# Patient Record
Sex: MALE | URBAN - METROPOLITAN AREA
[De-identification: names, ages, dates, MRNs, and addresses within clinical notes are randomized per-mention and may not be internally consistent; named-entity substitution may affect disease eponyms.]

---

## 2018-12-01 ENCOUNTER — HOSPITAL ENCOUNTER (EMERGENCY)
Facility: OTHER | Age: 73
Discharge: HOME | End: 2018-12-01
Attending: EMERGENCY MEDICINE
Payer: MEDICARE

## 2018-12-01 VITALS
SYSTOLIC BLOOD PRESSURE: 193 MMHG | HEIGHT: 64 IN | DIASTOLIC BLOOD PRESSURE: 85 MMHG | HEART RATE: 62 BPM | RESPIRATION RATE: 16 BRPM | WEIGHT: 150 LBS | OXYGEN SATURATION: 99 % | BODY MASS INDEX: 25.61 KG/M2 | TEMPERATURE: 98 F

## 2018-12-01 DIAGNOSIS — R10.9 ABDOMINAL PAIN, UNSPECIFIED ABDOMINAL LOCATION: Primary | ICD-10-CM

## 2018-12-01 DIAGNOSIS — N13.30 HYDRONEPHROSIS, UNSPECIFIED HYDRONEPHROSIS TYPE: ICD-10-CM

## 2018-12-01 LAB
ALBUMIN SERPL BCP-MCNC: 3.2 G/DL
ALP SERPL-CCNC: 75 U/L
ALT SERPL W/O P-5'-P-CCNC: 13 U/L
ANION GAP SERPL CALC-SCNC: 8 MMOL/L
AST SERPL-CCNC: 20 U/L
BACTERIA #/AREA URNS HPF: NORMAL /HPF
BASOPHILS # BLD AUTO: 0 K/UL
BASOPHILS NFR BLD: 0 %
BILIRUB SERPL-MCNC: 0.3 MG/DL
BILIRUB UR QL STRIP: NEGATIVE
BUN SERPL-MCNC: 13 MG/DL
CALCIUM SERPL-MCNC: 8.4 MG/DL
CHLORIDE SERPL-SCNC: 106 MMOL/L
CLARITY UR: CLEAR
CO2 SERPL-SCNC: 22 MMOL/L
COLOR UR: YELLOW
CREAT SERPL-MCNC: 1.5 MG/DL
DIFFERENTIAL METHOD: ABNORMAL
EOSINOPHIL # BLD AUTO: 0 K/UL
EOSINOPHIL NFR BLD: 0.2 %
ERYTHROCYTE [DISTWIDTH] IN BLOOD BY AUTOMATED COUNT: 12.7 %
EST. GFR  (AFRICAN AMERICAN): 53 ML/MIN/1.73 M^2
EST. GFR  (NON AFRICAN AMERICAN): 46 ML/MIN/1.73 M^2
GLUCOSE SERPL-MCNC: 140 MG/DL
GLUCOSE UR QL STRIP: NEGATIVE
HCT VFR BLD AUTO: 35.8 %
HGB BLD-MCNC: 11.8 G/DL
HGB UR QL STRIP: ABNORMAL
HYALINE CASTS #/AREA URNS LPF: 0 /LPF
KETONES UR QL STRIP: NEGATIVE
LEUKOCYTE ESTERASE UR QL STRIP: NEGATIVE
LIPASE SERPL-CCNC: 21 U/L
LYMPHOCYTES # BLD AUTO: 1.1 K/UL
LYMPHOCYTES NFR BLD: 23.2 %
MCH RBC QN AUTO: 31.2 PG
MCHC RBC AUTO-ENTMCNC: 33 G/DL
MCV RBC AUTO: 95 FL
MICROSCOPIC COMMENT: NORMAL
MONOCYTES # BLD AUTO: 0.3 K/UL
MONOCYTES NFR BLD: 6.4 %
NEUTROPHILS # BLD AUTO: 3.4 K/UL
NEUTROPHILS NFR BLD: 70 %
NITRITE UR QL STRIP: NEGATIVE
PH UR STRIP: 7 [PH] (ref 5–8)
PLATELET # BLD AUTO: 261 K/UL
PMV BLD AUTO: 9.3 FL
POTASSIUM SERPL-SCNC: 4.7 MMOL/L
PROT SERPL-MCNC: 6.8 G/DL
PROT UR QL STRIP: ABNORMAL
RBC # BLD AUTO: 3.78 M/UL
RBC #/AREA URNS HPF: 3 /HPF (ref 0–4)
SODIUM SERPL-SCNC: 136 MMOL/L
SP GR UR STRIP: 1.02 (ref 1–1.03)
URN SPEC COLLECT METH UR: ABNORMAL
UROBILINOGEN UR STRIP-ACNC: NEGATIVE EU/DL
WBC # BLD AUTO: 4.88 K/UL
WBC #/AREA URNS HPF: 4 /HPF (ref 0–5)

## 2018-12-01 PROCEDURE — 80053 COMPREHEN METABOLIC PANEL: CPT

## 2018-12-01 PROCEDURE — 81000 URINALYSIS NONAUTO W/SCOPE: CPT

## 2018-12-01 PROCEDURE — 85025 COMPLETE CBC W/AUTO DIFF WBC: CPT

## 2018-12-01 PROCEDURE — 99284 EMERGENCY DEPT VISIT MOD MDM: CPT | Mod: 25

## 2018-12-01 PROCEDURE — 96360 HYDRATION IV INFUSION INIT: CPT | Mod: 59

## 2018-12-01 PROCEDURE — 83690 ASSAY OF LIPASE: CPT

## 2018-12-01 PROCEDURE — 51702 INSERT TEMP BLADDER CATH: CPT

## 2018-12-01 PROCEDURE — 25000003 PHARM REV CODE 250: Performed by: EMERGENCY MEDICINE

## 2018-12-01 RX ADMIN — SODIUM CHLORIDE 1000 ML: 0.9 INJECTION, SOLUTION INTRAVENOUS at 03:12

## 2018-12-01 NOTE — ED NOTES
Pt resting in bed comfortably, bp elevated, MD aware. Pt states he has not taken BP meds x 3days. Bed in the lowest locked position, side rails up x 2, call light within reach, NAD noted. Will continue to monitor.

## 2018-12-01 NOTE — ED NOTES
Pt resting in bed comfortably. Bed in the lowest locked position, side rails up x 2, call light within reach, NAD noted. Will continue to monitor.

## 2018-12-01 NOTE — ED NOTES
Pt /85, asymptomatic at this time, denies NA, SOB, CP, numbness/tingling. MD, Yunior, aware, ok to D/C.

## 2018-12-01 NOTE — ED PROVIDER NOTES
Encounter Date: 12/1/2018    SCRIBE #1 NOTE: I, Danny Holliday, am scribing for, and in the presence of, Dr. Mejia .       History     Chief Complaint   Patient presents with    Abdominal Pain     Pt came to the ed tonight c.o. abdominal pain llq sharp. pt x 1 day. pt seen at [facility] for same abdominal pain and had no dz.      Time seen by provider: 2:55 AM    This is a 73 y.o. male, with history of DM and HTN, who presents via EMS with complaint of constant, sharp, left-sided, abdominal pain that has been present for over a week. Per medical record, he presented to [facility] one week ago with left flank pain and had labs and abdominal x-ray there, and was treated for constipation. Patient states he was given a muscle relaxer and Colace and was advised to take Tylenol for pain as needed. He states the medication has provided no relief. He denies history of similar abdominal pain. He reports his last bowel movement was one week ago. He has tried taking [place] oil with no relief of constipation. Patient reports drinking plenty of water, but states he has been experiencing some decreased urinary output. He denies fevers, chills, decreased appetite, congestion, rhinorrhea, sore throat, cough, SOB, chest pain, N/V/D, bloody stools, hematuria, dysuria, urinary frequency, or urinary urgency. He notes his blood glucose has been fluctuating (125 today).       The history is provided by the patient.     Review of patient's allergies indicates:  No Known Allergies  No past medical history on file.  No past surgical history on file.  No family history on file.  Social History     Tobacco Use    Smoking status: Not on file   Substance Use Topics    Alcohol use: Not on file    Drug use: Not on file     Review of Systems   Constitutional: Negative for chills and fever.   HENT: Negative for congestion, rhinorrhea and sore throat.    Respiratory: Negative for cough and shortness of breath.    Cardiovascular: Negative for chest pain.    Gastrointestinal: Positive for abdominal pain and constipation. Negative for blood in stool, diarrhea, nausea and vomiting.   Genitourinary: Positive for decreased urine volume and difficulty urinating. Negative for dysuria, frequency, hematuria and urgency.   Musculoskeletal: Negative for back pain.   Skin: Negative for rash.   Neurological: Negative for dizziness and weakness.   Psychiatric/Behavioral: Negative for confusion.       Physical Exam     Initial Vitals [12/01/18 0223]   BP Pulse Resp Temp SpO2   (!) 191/98 95 16 98.2 °F (36.8 °C) 100 %      MAP       --         Physical Exam    Nursing note and vitals reviewed.  Constitutional: He appears well-developed and well-nourished. No distress.   HENT:   Head: Normocephalic and atraumatic.   Eyes: Conjunctivae and EOM are normal. Pupils are equal, round, and reactive to light.   Neck: Normal range of motion. Neck supple.   Cardiovascular: Normal rate, regular rhythm, normal heart sounds and intact distal pulses.   Pulmonary/Chest: Breath sounds normal. No respiratory distress. He has no wheezes. He has no rhonchi. He has no rales.   Abdominal: Soft. There is tenderness. There is no rebound and no guarding.   Mild LUQ tenderness without rebound or guarding.    Musculoskeletal: Normal range of motion. He exhibits no tenderness.   No CVA tenderness.    Neurological: He is alert and oriented to person, place, and time. He has normal strength. No cranial nerve deficit.   Skin: Skin is warm and dry.   Psychiatric: He has a normal mood and affect. His behavior is normal. Judgment and thought content normal.         ED Course   Procedures  Labs Reviewed   CBC W/ AUTO DIFFERENTIAL - Abnormal; Notable for the following components:       Result Value    RBC 3.78 (*)     Hemoglobin 11.8 (*)     Hematocrit 35.8 (*)     MCH 31.2 (*)     All other components within normal limits   COMPREHENSIVE METABOLIC PANEL - Abnormal; Notable for the following components:    CO2 22 (*)      Glucose 140 (*)     Creatinine 1.5 (*)     Calcium 8.4 (*)     Albumin 3.2 (*)     eGFR if  53 (*)     eGFR if non  46 (*)     All other components within normal limits   URINALYSIS, REFLEX TO URINE CULTURE - Abnormal; Notable for the following components:    Protein, UA 1+ (*)     Occult Blood UA Trace (*)     All other components within normal limits    Narrative:     Preferred Collection Type->Urine, Clean Catch   LIPASE   URINALYSIS MICROSCOPIC    Narrative:     Preferred Collection Type->Urine, Clean Catch          Imaging Results          CT Abdomen Pelvis  Without Contrast (Final result)     Abnormal  Result time 12/01/18 04:56:26    Final result by Davin Velasquez MD (12/01/18 04:56:26)                 Impression:      1.  Severe left-sided and moderate right-sided hydronephrosis with dilatation of the bilateral ureters to the level of the bladder.  There is soft tissue density identified at the base of the bladder, slightly asymmetric to the left, which may represent the enlarged prostate protruding into the bladder lumen, although secondary bladder neoplasm is not excluded.  Urologic consultation and direct visualization as well as correlation with PSA is advised.    2.  Bilateral perinephric edema/stranding, left more so than right, which may relate to obstruction, although correlation with urinalysis is advised to exclude superimposed infection.    3.  Asymmetric 2.1 cm soft tissue density within the left hemipelvis could represent an enlarged left iliac chain lymph node in light of aforementioned findings.  Follow-up assessment could be performed with contrast enhanced CT for further characterization.    3.  Subcentimeter left hepatic lobe hypodensity, too small to accurately characterize.    This report was flagged in Epic as abnormal.      Electronically signed by: Davin Velasquez MD  Date:    12/01/2018  Time:    04:56             Narrative:    EXAMINATION:  CT  ABDOMEN PELVIS WITHOUT CONTRAST    CLINICAL HISTORY:  Abdominal pain, unspecified;LUQ;    TECHNIQUE:  Low dose axial images, sagittal and coronal reformations were obtained from the lung bases to the pubic symphysis without IV contrast.  Oral contrast was not administered.    COMPARISON:  None    FINDINGS:  There are linear bandlike opacities at the lung bases suggestive of platelike atelectasis or scarring.  There is no significant pleural effusion.  The visualized portions of the heart appear normal.    The liver is normal in size.  There is a 0.9 cm hypodensity within the left hepatic lobe which is too small to accurately characterize.  A small punctate calcification is noted within the right hepatic lobe.  The gallbladder shows no evidence of stones or pericholecystic fluid.  There is no intra-or extrahepatic biliary ductal dilatation.    The stomach, spleen, pancreas, and adrenal glands are unremarkable.    There is severe left-sided hydroureteronephrosis.  The left ureter appears dilated to the level of the bladder, noting the most distal portion of the left ureter is not well visualized.  No definite obstructing calculus identified within the ureter along its course.  There is significant degree of left-sided perinephric edema and stranding.  The right kidney demonstrates mild to moderate hydronephrosis with dilatation of the ureter to the level of bladder.  No definite obstructing calculus identified within the right ureter.  There is mild right-sided perinephric stranding and edema.  The prostate is enlarged measuring 4.7 cm.  There is apparent abnormal soft tissue density at the base of the bladder, slightly asymmetric to the left, measuring approximately 4.0 x 2.6 cm.  This may relate to protrusion of the enlarged prostate into the bladder lumen, although secondary bladder mass not excluded.  There is mild prominence of the bilateral seminal vesicles.  The urinary bladder wall contours otherwise are  unremarkable.  There is no significant free fluid within the pelvis.    The abdominal aorta is normal in course and caliber with mild atherosclerotic calcification along its course.The visualized loops of small and large bowel show no evidence of obstruction or inflammation.  The appendix appears within normal limits.  There is no free intraperitoneal air or portal venous gas.  There is a 2.1 cm oval soft tissue density within the left hemipelvis which could represent an enlarged lymph node or possibly an unopacified loop of small bowel (axial series, image 126).    There are moderate degenerative changes of the visualized spine.  The extraperitoneal soft tissues are unremarkable.                                 Medical Decision Making:   Initial Assessment:       73-year-old male with history of HTN/DM presents with persistent left-sided abdominal pain for over 1 week.  He was seen at [facility] for this and had labs and abdominal x-ray with no acute findings, and was diagnosed with constipation.  He has been taking Colace and increasing p.o. hydration but still no BM in the past week.  He also complains of increased difficulty urinating and decreased output despite increasing fluid intake.  No nausea or vomiting, fevers, worsening of pain with p.o. Intake, hematuria, or other complaints. No history of similar previous symptoms or constipation issues.  [facility] workup reviewed, and since patient with no improvement and abdominal x-rays only show constipation, will likely CT abdomen and repeat basic labs.      Labs here with normal CBC, no sign UTI or pyelo, and no other acute findings.  CT abdomen done without contrast due to creatinine 1.5, and shows severe left-sided and moderate right-sided hydronephrosis with dilation of ureters to the level of the bladder.  There may be enlarged prostate protruding into the bladder lumen or bladder neoplasm as cause of obstruction.  Patient denies any known history of BPH or bladder  polyps.  Garcia was placed with minimal urine output, so level obstruction more likely to be intrinsic the bladder instead of from BPH.  Concern for bladder cancer, metastatic prostate cancer.  Discussed with Urology on-call Dr. Caballero, who states that of pain is controlled patient can be discharged with Garcia removed and close urology clinic follow-up for cystoscopy and biopsy.  Patient states he has a  to attend today so cannot be admitted for this workup, and will call Urology ASAP for follow-up.  He will return to the ED for any worsening pain, new vomiting, difficulty urinating, or other concerns.  He is advised to take MiraLax over-the-counter for constipation, no constipation may be due to hydronephrosis and compression of colon.          Clinical Tests:   Lab Tests: Ordered and Reviewed            Scribe Attestation:   Scribe #1: I performed the above scribed service and the documentation accurately describes the services I performed. I attest to the accuracy of the note.    Attending Attestation:           Physician Attestation for Scribe:  Physician Attestation Statement for Scribe #1: I, Dr. Mejia, reviewed documentation, as scribed by Danny Holliday  in my presence, and it is both accurate and complete.                    Clinical Impression:     1. Abdominal pain, unspecified abdominal location    2. Hydronephrosis, unspecified hydronephrosis type                                   Gonzalez Mejia MD  [phone]

## 2019-03-11 ENCOUNTER — HOSPITAL ENCOUNTER (INPATIENT)
Facility: OTHER | Age: 74
LOS: 10 days | Discharge: HOME HEALTH | DRG: 660 | End: 2019-03-21
Attending: EMERGENCY MEDICINE | Admitting: INTERNAL MEDICINE
Payer: MEDICARE

## 2019-03-11 DIAGNOSIS — N13.30 BILATERAL HYDRONEPHROSIS: Primary | ICD-10-CM

## 2019-03-11 DIAGNOSIS — N17.9 AKI (ACUTE KIDNEY INJURY): ICD-10-CM

## 2019-03-11 DIAGNOSIS — R26.81 GAIT INSTABILITY: ICD-10-CM

## 2019-03-11 DIAGNOSIS — E11.9 TYPE 2 DIABETES MELLITUS WITHOUT COMPLICATION, WITHOUT LONG-TERM CURRENT USE OF INSULIN: ICD-10-CM

## 2019-03-11 DIAGNOSIS — R35.89 POSTOBSTRUCTIVE DIURESIS: ICD-10-CM

## 2019-03-11 DIAGNOSIS — I10 ESSENTIAL HYPERTENSION: ICD-10-CM

## 2019-03-11 DIAGNOSIS — N13.30 HYDRONEPHROSIS, UNSPECIFIED HYDRONEPHROSIS TYPE: ICD-10-CM

## 2019-03-11 DIAGNOSIS — R97.20 ELEVATED PSA: ICD-10-CM

## 2019-03-11 DIAGNOSIS — N13.30 HYDRONEPHROSIS: ICD-10-CM

## 2019-03-11 DIAGNOSIS — N32.0 BLADDER OUTLET OBSTRUCTION: ICD-10-CM

## 2019-03-11 DIAGNOSIS — C79.9 METASTASIS FROM MALIGNANT NEOPLASM OF PROSTATE: ICD-10-CM

## 2019-03-11 DIAGNOSIS — C61 METASTASIS FROM MALIGNANT NEOPLASM OF PROSTATE: ICD-10-CM

## 2019-03-11 PROBLEM — E87.20 METABOLIC ACIDOSIS: Status: ACTIVE | Noted: 2019-03-11

## 2019-03-11 PROBLEM — E78.5 HYPERLIPIDEMIA: Status: ACTIVE | Noted: 2019-03-11

## 2019-03-11 LAB
ALBUMIN SERPL BCP-MCNC: 4 G/DL
ALP SERPL-CCNC: 91 U/L
ALT SERPL W/O P-5'-P-CCNC: 11 U/L
ANION GAP SERPL CALC-SCNC: 11 MMOL/L
ANION GAP SERPL CALC-SCNC: 14 MMOL/L
AST SERPL-CCNC: 19 U/L
BASOPHILS # BLD AUTO: 0.01 K/UL
BASOPHILS NFR BLD: 0.2 %
BILIRUB SERPL-MCNC: 0.3 MG/DL
BILIRUB UR QL STRIP: NEGATIVE
BILIRUB UR QL STRIP: NEGATIVE
BUN SERPL-MCNC: 51 MG/DL
BUN SERPL-MCNC: 53 MG/DL
CALCIUM SERPL-MCNC: 9 MG/DL
CALCIUM SERPL-MCNC: 9.2 MG/DL
CHLORIDE SERPL-SCNC: 104 MMOL/L
CHLORIDE SERPL-SCNC: 106 MMOL/L
CLARITY UR: CLEAR
CLARITY UR: CLEAR
CO2 SERPL-SCNC: 19 MMOL/L
CO2 SERPL-SCNC: 20 MMOL/L
COLOR UR: YELLOW
COLOR UR: YELLOW
COMPLEXED PSA SERPL-MCNC: 369.2 NG/ML
CREAT SERPL-MCNC: 4.5 MG/DL
CREAT SERPL-MCNC: 5.2 MG/DL
DIFFERENTIAL METHOD: ABNORMAL
EOSINOPHIL # BLD AUTO: 0 K/UL
EOSINOPHIL NFR BLD: 0.2 %
ERYTHROCYTE [DISTWIDTH] IN BLOOD BY AUTOMATED COUNT: 13.5 %
EST. GFR  (AFRICAN AMERICAN): 12 ML/MIN/1.73 M^2
EST. GFR  (AFRICAN AMERICAN): 14 ML/MIN/1.73 M^2
EST. GFR  (NON AFRICAN AMERICAN): 10 ML/MIN/1.73 M^2
EST. GFR  (NON AFRICAN AMERICAN): 12 ML/MIN/1.73 M^2
GLUCOSE SERPL-MCNC: 140 MG/DL
GLUCOSE SERPL-MCNC: 157 MG/DL
GLUCOSE UR QL STRIP: NEGATIVE
GLUCOSE UR QL STRIP: NEGATIVE
HCT VFR BLD AUTO: 31.5 %
HGB BLD-MCNC: 10.7 G/DL
HGB UR QL STRIP: ABNORMAL
HGB UR QL STRIP: ABNORMAL
KETONES UR QL STRIP: NEGATIVE
KETONES UR QL STRIP: NEGATIVE
LEUKOCYTE ESTERASE UR QL STRIP: NEGATIVE
LEUKOCYTE ESTERASE UR QL STRIP: NEGATIVE
LYMPHOCYTES # BLD AUTO: 1.1 K/UL
LYMPHOCYTES NFR BLD: 18.2 %
MCH RBC QN AUTO: 31.7 PG
MCHC RBC AUTO-ENTMCNC: 34 G/DL
MCV RBC AUTO: 93 FL
MONOCYTES # BLD AUTO: 0.2 K/UL
MONOCYTES NFR BLD: 3.8 %
NEUTROPHILS # BLD AUTO: 4.7 K/UL
NEUTROPHILS NFR BLD: 77.6 %
NITRITE UR QL STRIP: NEGATIVE
NITRITE UR QL STRIP: NEGATIVE
PH UR STRIP: 6 [PH] (ref 5–8)
PH UR STRIP: 6 [PH] (ref 5–8)
PLATELET # BLD AUTO: 309 K/UL
PMV BLD AUTO: 11.1 FL
POCT GLUCOSE: 149 MG/DL (ref 70–110)
POCT GLUCOSE: 224 MG/DL (ref 70–110)
POTASSIUM SERPL-SCNC: 4.5 MMOL/L
POTASSIUM SERPL-SCNC: 5.6 MMOL/L
PROT SERPL-MCNC: 8 G/DL
PROT UR QL STRIP: ABNORMAL
PROT UR QL STRIP: ABNORMAL
RBC # BLD AUTO: 3.38 M/UL
SODIUM SERPL-SCNC: 136 MMOL/L
SODIUM SERPL-SCNC: 138 MMOL/L
SP GR UR STRIP: 1.02 (ref 1–1.03)
SP GR UR STRIP: 1.02 (ref 1–1.03)
URN SPEC COLLECT METH UR: ABNORMAL
URN SPEC COLLECT METH UR: ABNORMAL
UROBILINOGEN UR STRIP-ACNC: NEGATIVE EU/DL
UROBILINOGEN UR STRIP-ACNC: NEGATIVE EU/DL
WBC # BLD AUTO: 6.04 K/UL

## 2019-03-11 PROCEDURE — 83036 HEMOGLOBIN GLYCOSYLATED A1C: CPT

## 2019-03-11 PROCEDURE — 81003 URINALYSIS AUTO W/O SCOPE: CPT

## 2019-03-11 PROCEDURE — 99223 PR INITIAL HOSPITAL CARE,LEVL III: ICD-10-PCS | Mod: AI,,, | Performed by: INTERNAL MEDICINE

## 2019-03-11 PROCEDURE — 99223 PR INITIAL HOSPITAL CARE,LEVL III: ICD-10-PCS | Mod: ,,, | Performed by: NURSE PRACTITIONER

## 2019-03-11 PROCEDURE — 25000003 PHARM REV CODE 250: Performed by: EMERGENCY MEDICINE

## 2019-03-11 PROCEDURE — 99223 1ST HOSP IP/OBS HIGH 75: CPT | Mod: AI,,, | Performed by: INTERNAL MEDICINE

## 2019-03-11 PROCEDURE — 51702 INSERT TEMP BLADDER CATH: CPT

## 2019-03-11 PROCEDURE — 25000003 PHARM REV CODE 250: Performed by: INTERNAL MEDICINE

## 2019-03-11 PROCEDURE — 94761 N-INVAS EAR/PLS OXIMETRY MLT: CPT

## 2019-03-11 PROCEDURE — 84153 ASSAY OF PSA TOTAL: CPT

## 2019-03-11 PROCEDURE — 99291 CRITICAL CARE FIRST HOUR: CPT | Mod: 25

## 2019-03-11 PROCEDURE — 80048 BASIC METABOLIC PNL TOTAL CA: CPT

## 2019-03-11 PROCEDURE — 11000001 HC ACUTE MED/SURG PRIVATE ROOM

## 2019-03-11 PROCEDURE — 99223 1ST HOSP IP/OBS HIGH 75: CPT | Mod: ,,, | Performed by: NURSE PRACTITIONER

## 2019-03-11 PROCEDURE — 80053 COMPREHEN METABOLIC PANEL: CPT

## 2019-03-11 PROCEDURE — 36415 COLL VENOUS BLD VENIPUNCTURE: CPT

## 2019-03-11 PROCEDURE — 85025 COMPLETE CBC W/AUTO DIFF WBC: CPT

## 2019-03-11 RX ORDER — HYDRALAZINE HYDROCHLORIDE 20 MG/ML
10 INJECTION INTRAMUSCULAR; INTRAVENOUS EVERY 8 HOURS PRN
Status: DISCONTINUED | OUTPATIENT
Start: 2019-03-11 | End: 2019-03-16

## 2019-03-11 RX ORDER — ATENOLOL 25 MG/1
50 TABLET ORAL DAILY
Status: DISCONTINUED | OUTPATIENT
Start: 2019-03-11 | End: 2019-03-21 | Stop reason: HOSPADM

## 2019-03-11 RX ORDER — HYDROCODONE BITARTRATE AND ACETAMINOPHEN 5; 325 MG/1; MG/1
1 TABLET ORAL EVERY 6 HOURS PRN
Status: DISCONTINUED | OUTPATIENT
Start: 2019-03-11 | End: 2019-03-19

## 2019-03-11 RX ORDER — INSULIN ASPART 100 [IU]/ML
0-5 INJECTION, SOLUTION INTRAVENOUS; SUBCUTANEOUS
Status: DISCONTINUED | OUTPATIENT
Start: 2019-03-11 | End: 2019-03-21 | Stop reason: HOSPADM

## 2019-03-11 RX ORDER — ATORVASTATIN CALCIUM 20 MG/1
20 TABLET, FILM COATED ORAL DAILY
Status: DISCONTINUED | OUTPATIENT
Start: 2019-03-11 | End: 2019-03-21 | Stop reason: HOSPADM

## 2019-03-11 RX ORDER — HYDROCODONE BITARTRATE AND ACETAMINOPHEN 5; 325 MG/1; MG/1
1 TABLET ORAL
Status: COMPLETED | OUTPATIENT
Start: 2019-03-11 | End: 2019-03-11

## 2019-03-11 RX ORDER — METFORMIN HYDROCHLORIDE 500 MG/1
500 TABLET ORAL 2 TIMES DAILY WITH MEALS
Status: ON HOLD | COMMUNITY
End: 2019-03-21 | Stop reason: HOSPADM

## 2019-03-11 RX ORDER — SODIUM CHLORIDE 0.9 % (FLUSH) 0.9 %
5 SYRINGE (ML) INJECTION
Status: DISCONTINUED | OUTPATIENT
Start: 2019-03-11 | End: 2019-03-21 | Stop reason: HOSPADM

## 2019-03-11 RX ORDER — GLUCAGON 1 MG
1 KIT INJECTION
Status: DISCONTINUED | OUTPATIENT
Start: 2019-03-11 | End: 2019-03-21 | Stop reason: HOSPADM

## 2019-03-11 RX ORDER — SODIUM CHLORIDE 9 MG/ML
INJECTION, SOLUTION INTRAVENOUS CONTINUOUS
Status: DISCONTINUED | OUTPATIENT
Start: 2019-03-11 | End: 2019-03-11

## 2019-03-11 RX ORDER — LOSARTAN POTASSIUM AND HYDROCHLOROTHIAZIDE 12.5; 1 MG/1; MG/1
1 TABLET ORAL DAILY
Status: ON HOLD | COMMUNITY
End: 2019-03-21 | Stop reason: HOSPADM

## 2019-03-11 RX ORDER — ATENOLOL 50 MG/1
50 TABLET ORAL DAILY
COMMUNITY

## 2019-03-11 RX ORDER — ATORVASTATIN CALCIUM 20 MG/1
20 TABLET, FILM COATED ORAL DAILY
COMMUNITY

## 2019-03-11 RX ORDER — IBUPROFEN 200 MG
24 TABLET ORAL
Status: DISCONTINUED | OUTPATIENT
Start: 2019-03-11 | End: 2019-03-21 | Stop reason: HOSPADM

## 2019-03-11 RX ORDER — SODIUM CHLORIDE 9 MG/ML
1000 INJECTION, SOLUTION INTRAVENOUS
Status: DISCONTINUED | OUTPATIENT
Start: 2019-03-11 | End: 2019-03-11

## 2019-03-11 RX ORDER — AMLODIPINE BESYLATE 5 MG/1
5 TABLET ORAL DAILY
Status: DISCONTINUED | OUTPATIENT
Start: 2019-03-11 | End: 2019-03-19

## 2019-03-11 RX ORDER — AMLODIPINE BESYLATE 5 MG/1
5 TABLET ORAL DAILY
Status: ON HOLD | COMMUNITY
End: 2019-03-21 | Stop reason: HOSPADM

## 2019-03-11 RX ORDER — IBUPROFEN 200 MG
16 TABLET ORAL
Status: DISCONTINUED | OUTPATIENT
Start: 2019-03-11 | End: 2019-03-21 | Stop reason: HOSPADM

## 2019-03-11 RX ORDER — ASPIRIN 81 MG/1
81 TABLET ORAL DAILY
Status: ON HOLD | COMMUNITY
End: 2019-03-21 | Stop reason: HOSPADM

## 2019-03-11 RX ADMIN — AMLODIPINE BESYLATE 5 MG: 5 TABLET ORAL at 01:03

## 2019-03-11 RX ADMIN — HYDROCODONE BITARTRATE AND ACETAMINOPHEN 1 TABLET: 5; 325 TABLET ORAL at 09:03

## 2019-03-11 RX ADMIN — SODIUM CHLORIDE: 0.9 INJECTION, SOLUTION INTRAVENOUS at 01:03

## 2019-03-11 RX ADMIN — HYDROCODONE BITARTRATE AND ACETAMINOPHEN 1 TABLET: 5; 325 TABLET ORAL at 04:03

## 2019-03-11 RX ADMIN — ATENOLOL 50 MG: 25 TABLET ORAL at 01:03

## 2019-03-11 RX ADMIN — ATORVASTATIN CALCIUM 20 MG: 20 TABLET, FILM COATED ORAL at 01:03

## 2019-03-11 RX ADMIN — SODIUM BICARBONATE: 84 INJECTION, SOLUTION INTRAVENOUS at 05:03

## 2019-03-11 NOTE — SUBJECTIVE & OBJECTIVE
Past Medical History:   Diagnosis Date    Flank pain, chronic     Right sided, due to injury in 20's per pet    Hyperlipidemia     Hypertension        History reviewed. No pertinent surgical history.    Review of patient's allergies indicates:  No Known Allergies    Family History     None          Tobacco Use    Smoking status: Never Smoker    Smokeless tobacco: Never Used   Substance and Sexual Activity    Alcohol use: No     Frequency: Never    Drug use: No    Sexual activity: Not on file       Review of Systems   Constitutional: Negative for chills and fever.   Respiratory: Negative for chest tightness and shortness of breath.    Cardiovascular: Negative for chest pain and palpitations.   Gastrointestinal: Positive for abdominal pain and nausea. Negative for abdominal distention, constipation and vomiting.   Genitourinary: Positive for decreased urine volume, flank pain (R>L\) and frequency. Negative for difficulty urinating, dysuria, hematuria and urgency.       Objective:     Temp:  [97.4 °F (36.3 °C)-97.5 °F (36.4 °C)] 97.4 °F (36.3 °C)  Pulse:  [52-60] 58  Resp:  [12-18] 18  SpO2:  [99 %-100 %] 99 %  BP: (182-200)/(81-98) 182/81     Body mass index is 25.72 kg/m².       Bladder Scan Volume (mL): 20 mL (03/11/19 1141)    Drains     Drain                 Urethral Catheter 03/11/19 1140 Coude 16 Fr. less than 1 day                Physical Exam   Constitutional: He is oriented to person, place, and time. He appears well-developed and well-nourished.   HENT:   Head: Normocephalic and atraumatic.   Cardiovascular: Normal rate, regular rhythm and normal heart sounds.    Pulmonary/Chest: Effort normal and breath sounds normal.   Abdominal: Soft. Bowel sounds are normal. He exhibits no distension. There is tenderness in the right upper quadrant and right lower quadrant. There is CVA tenderness (R>L).   Genitourinary:   Genitourinary Comments: Garcia to gravity with clear, yellow urine    Neurological: He is  "alert and oriented to person, place, and time.   Skin: Skin is warm and dry.     Psychiatric: He has a normal mood and affect. His behavior is normal.       Significant Labs:    BMP:  Recent Labs   Lab 03/11/19  0906      K 4.5      CO2 20*   BUN 51*   CREATININE 4.5*   CALCIUM 9.2       CBC:  Recent Labs   Lab 03/11/19  0906   WBC 6.04   HGB 10.7*   HCT 31.5*          CMP:   Recent Labs   Lab 03/11/19  0906   *      K 4.5      CO2 20*   BUN 51*   CREATININE 4.5*   CALCIUM 9.2     Urine Culture: No results for input(s): LABURIN in the last 168 hours.  Urine Studies:   Recent Labs   Lab 03/11/19 0906   COLORU Yellow  Yellow   APPEARANCEUA Clear  Clear   PHUR 6.0  6.0   SPECGRAV 1.020  1.020   PROTEINUA Trace*  Trace*   GLUCUA Negative  Negative   KETONESU Negative  Negative   BILIRUBINUA Negative  Negative   OCCULTUA Trace*  Trace*   NITRITE Negative  Negative   UROBILINOGEN Negative  Negative   LEUKOCYTESUR Negative  Negative        Significant Imaging:  CT: I have reviewed all results within the past 24 hours and my personal findings are:  "Worsening bilateral hydronephrosis severe on the left and moderate to severe on the right with hydroureter to the level of the bladder suggesting an acute on chronic component.  There is enlargement of the prostate with a nodular contour superiorly which impresses on the bladder base.  This is likely relates to the prostate rather than a true bladder mass although follow-up with urology for direct visualization is recommended."                "

## 2019-03-11 NOTE — HPI
Mr. Mullins is a 74 year old man who presented with pain in the right flank for one day associated with nausea and vomiting.  He has had urinary frequency but no dysuria, hematuria fever or chills.  His urinary stream has been weaker for the last 2 months.  He presented here with left flank pain in December last year, and a CT done at that time showed severe left-sided and moderate right-sided hydronephrosis with and a soft tissue density thought to be the enlarged prostate protruding  into the bladder lumen.  He had a Garcia placed in the ED with improvement in symptoms and was removed prior to discharge.  He was supposed to follow up with urology but did not.  Repeat CT done this presentation showed worsening bilateral hydronephrosis and enlargement of the prostate.  Labs showed ALEIDA with a creatinine of 4.5.  A Garcia was placed with only 20 mL urine produced.  He was admitted with urology consultation.

## 2019-03-11 NOTE — SUBJECTIVE & OBJECTIVE
Past Medical History:   Diagnosis Date    Flank pain, chronic     Right sided, due to injury in 20's per pet    Hyperlipidemia     Hypertension        History reviewed. No pertinent surgical history.    Review of patient's allergies indicates:  No Known Allergies    No current facility-administered medications on file prior to encounter.      Current Outpatient Medications on File Prior to Encounter   Medication Sig    amLODIPine (NORVASC) 5 MG tablet Take 5 mg by mouth once daily.    aspirin (ECOTRIN) 81 MG EC tablet Take 81 mg by mouth once daily.    atenolol (TENORMIN) 50 MG tablet Take 50 mg by mouth once daily.    atorvastatin (LIPITOR) 20 MG tablet Take 20 mg by mouth once daily.    losartan-hydrochlorothiazide 100-12.5 mg (HYZAAR) 100-12.5 mg Tab Take 1 tablet by mouth once daily.    metFORMIN (GLUCOPHAGE) 500 MG tablet Take 500 mg by mouth 2 (two) times daily with meals.     Family History     None        Tobacco Use    Smoking status: Never Smoker    Smokeless tobacco: Never Used   Substance and Sexual Activity    Alcohol use: No     Frequency: Never    Drug use: No    Sexual activity: Not on file     Review of Systems   Constitutional: Negative for chills and fever.   HENT: Negative for sinus pain and trouble swallowing.    Respiratory: Negative for cough and shortness of breath.    Cardiovascular: Negative for chest pain and leg swelling.   Gastrointestinal: Positive for abdominal pain, nausea and vomiting. Negative for diarrhea.   Genitourinary: Positive for difficulty urinating. Negative for dysuria and hematuria.   Musculoskeletal: Negative for arthralgias and joint swelling.   Skin: Negative for rash.   Neurological: Negative for numbness and headaches.   Psychiatric/Behavioral: Negative for confusion.     Objective:     Vital Signs (Most Recent):  Temp: 96.5 °F (35.8 °C) (03/11/19 1545)  Pulse: (!) 56 (03/11/19 1545)  Resp: 16 (03/11/19 1545)  BP: (!) 192/83 (03/11/19 1545)  SpO2: 100 %  (03/11/19 1545) Vital Signs (24h Range):  Temp:  [96.5 °F (35.8 °C)-97.5 °F (36.4 °C)] 96.5 °F (35.8 °C)  Pulse:  [52-60] 56  Resp:  [12-18] 16  SpO2:  [99 %-100 %] 100 %  BP: (182-200)/(81-98) 192/83     Weight: 70.1 kg (154 lb 8.7 oz)  Body mass index is 25.72 kg/m².    Physical Exam   Constitutional: He is oriented to person, place, and time. No distress.   HENT:   Head: Normocephalic and atraumatic.   Eyes: EOM are normal. Pupils are equal, round, and reactive to light.   Neck: Normal range of motion. Neck supple.   Cardiovascular: Normal rate and regular rhythm.   Pulmonary/Chest: Effort normal and breath sounds normal. No respiratory distress.   Abdominal: Soft. Bowel sounds are normal. He exhibits no distension. There is no tenderness.   No cva tenderness    Musculoskeletal: Normal range of motion. He exhibits no edema.   Neurological: He is alert and oriented to person, place, and time. No cranial nerve deficit.   Skin: Skin is warm.   Psychiatric: He has a normal mood and affect.   Vitals reviewed.        CRANIAL NERVES     CN III, IV, VI   Pupils are equal, round, and reactive to light.  Extraocular motions are normal.        Significant Labs:   CBC:   Recent Labs   Lab 03/11/19  0906   WBC 6.04   HGB 10.7*   HCT 31.5*        CMP:   Recent Labs   Lab 03/11/19  0906      K 4.5      CO2 20*   *   BUN 51*   CREATININE 4.5*   CALCIUM 9.2   PROT 8.0   ALBUMIN 4.0   BILITOT 0.3   ALKPHOS 91   AST 19   ALT 11   ANIONGAP 14   EGFRNONAA 12*       Significant Imaging:  CT Renal Stone Study ABD Pelvis WO  Narrative: EXAMINATION:  CT RENAL STONE STUDY ABD PELVIS WO    CLINICAL HISTORY:  Flank pain, stone disease suspected;    TECHNIQUE:  Low dose axial images, sagittal and coronal reformations were obtained from the lung bases to the pubic symphysis.  Contrast was not administered.    COMPARISON:  12/01/2018    FINDINGS:  Visualized portions of the lung bases and heart demonstrate no acute  abnormalities.  Stable subcentimeter soft tissue densities adjacent to the lower thoracic esophagus.    Subcentimeter left hepatic hypodensity is too small to characterize but appears stable.  There is a right hepatic lobe granuloma.  Gallbladder shows no evidence of radiopaque Stones or inflammatory changes.  Stomach, spleen, pancreas and adrenal glands demonstrate no significant abnormality.    The small and large bowel show no evidence of obstruction.  No free air or ascites.  No inflammatory changes in the right lower quadrant.    There is worsening hydronephrosis bilaterally which is severe on the left and moderate to severe on the right.  There is dilatation of the ureters to the urinary bladder which is nondistended.  There is nodular enlargement of the prostate which impresses on the bladder base.  This is favored to represent prostatomegaly rather than a true bladder mass.  Seminal vesicles are mildly enlarged but appears symmetric.  Mildly enlarged iliac chain lymph nodes on the left similar to prior.    Aorta is normal in caliber with mild atherosclerosis.  Bones demonstrate degenerative changes with no acute bony abnormalities.  Impression: Worsening bilateral hydronephrosis severe on the left and moderate to severe on the right with hydroureter to the level of the bladder suggesting an acute on chronic component.  There is enlargement of the prostate with a nodular contour superiorly which impresses on the bladder base.  This is likely relates to the prostate rather than a true bladder mass although follow-up with urology for direct visualization is recommended.    This report was flagged in Epic as abnormal.    Electronically signed by: Tuan Pittman MD  Date:    03/11/2019  Time:    11:15

## 2019-03-11 NOTE — CONSULTS
"[facility]  Urology  Consult Note    Patient Name: Elissa Mullins  MRN: [id number]  Admission Date: 3/11/2019  Hospital Length of Stay: 0   Code Status: Full Code   Attending Provider: Bella Grimaldo MD   Consulting Provider: Kerrie Prater NP  Primary Care Physician: Teresa Dominguez MD (Inactive)  Principal Problem:<principal problem not specified>    Inpatient consult to Urology  Consult performed by: Kerrie Prater NP  Consult ordered by: Khushi Palacios MD  Reason for consult: Bladder outlet obstruction  Assessment/Recommendations: Bladder outlet obstruction  --Discussed with Dr. Cedeño, images reviewed   --Maintain arenas  --Monitor creatinine overnight   --PSA  --NPO after MN for possible cysto with retrograde pyelogram +/- stent placement with Dr. Cedeño            Subjective:     HPI:  Mr. Mullins is a 73 y.o. male who presented to the ED with right flank pain that began this morning. Creatinine on admit was noted to be severely elevated at 4.5 with stable electrolytes. UA with only trace protein and RBCs. CT scan revealed "worsening bilateral hydronephrosis severe on the left and moderate to severe on the right with hydroureter to the level of the bladder suggesting an acute on chronic component.  There is enlargement of the prostate with a nodular contour superiorly which impresses on the bladder base." Upon chart review, it appears the patient presented with similar symptoms in December 2018; however did not follow up as instructed. At the time he had moderate-severe bilateral hydro and a creatinine level of 1.5. Urology was consulted for evaluation.      He reports an aching right flank pain with radiation to the abdomen that began this morning. He also reports the "feeling of incomplete bladder emptying" and urinary frequency that began months ago. Arenas was placed on admit with only 20 mL of urine output. Denies a slow stream, dysuria, hematuria, and fever/chills. + nausea that began " last night; however denies vomiting. Denies a history of recurrent UTIs and nephrolithiasis. Denies a personal/family history of prostate cancer. Denies constipation, last BM yesterday.       Past Medical History:   Diagnosis Date    Flank pain, chronic     Right sided, due to injury in 20's per pet    Hyperlipidemia     Hypertension        History reviewed. No pertinent surgical history.    Review of patient's allergies indicates:  No Known Allergies    Family History     None          Tobacco Use    Smoking status: Never Smoker    Smokeless tobacco: Never Used   Substance and Sexual Activity    Alcohol use: No     Frequency: Never    Drug use: No    Sexual activity: Not on file       Review of Systems   Constitutional: Negative for chills and fever.   Respiratory: Negative for chest tightness and shortness of breath.    Cardiovascular: Negative for chest pain and palpitations.   Gastrointestinal: Positive for abdominal pain and nausea. Negative for abdominal distention, constipation and vomiting.   Genitourinary: Positive for decreased urine volume, flank pain (R>L\) and frequency. Negative for difficulty urinating, dysuria, hematuria and urgency.       Objective:     Temp:  [97.4 °F (36.3 °C)-97.5 °F (36.4 °C)] 97.4 °F (36.3 °C)  Pulse:  [52-60] 58  Resp:  [12-18] 18  SpO2:  [99 %-100 %] 99 %  BP: (182-200)/(81-98) 182/81     Body mass index is 25.72 kg/m².       Bladder Scan Volume (mL): 20 mL (03/11/19 1141)    Drains     Drain                 Urethral Catheter 03/11/19 1140 Coude 16 Fr. less than 1 day                Physical Exam   Constitutional: He is oriented to person, place, and time. He appears well-developed and well-nourished.   HENT:   Head: Normocephalic and atraumatic.   Cardiovascular: Normal rate, regular rhythm and normal heart sounds.    Pulmonary/Chest: Effort normal and breath sounds normal.   Abdominal: Soft. Bowel sounds are normal. He exhibits no distension. There is tenderness in  "the right upper quadrant and right lower quadrant. There is CVA tenderness (R>L).   Genitourinary:   Genitourinary Comments: Arenas to gravity with clear, yellow urine    Neurological: He is alert and oriented to person, place, and time.   Skin: Skin is warm and dry.     Psychiatric: He has a normal mood and affect. His behavior is normal.       Significant Labs:    BMP:  Recent Labs   Lab 03/11/19  0906      K 4.5      CO2 20*   BUN 51*   CREATININE 4.5*   CALCIUM 9.2       CBC:  Recent Labs   Lab 03/11/19  0906   WBC 6.04   HGB 10.7*   HCT 31.5*          CMP:   Recent Labs   Lab 03/11/19  0906   *      K 4.5      CO2 20*   BUN 51*   CREATININE 4.5*   CALCIUM 9.2     Urine Culture: No results for input(s): LABURIN in the last 168 hours.  Urine Studies:   Recent Labs   Lab 03/11/19 0906   COLORU Yellow  Yellow   APPEARANCEUA Clear  Clear   PHUR 6.0  6.0   SPECGRAV 1.020  1.020   PROTEINUA Trace*  Trace*   GLUCUA Negative  Negative   KETONESU Negative  Negative   BILIRUBINUA Negative  Negative   OCCULTUA Trace*  Trace*   NITRITE Negative  Negative   UROBILINOGEN Negative  Negative   LEUKOCYTESUR Negative  Negative        Significant Imaging:  CT: I have reviewed all results within the past 24 hours and my personal findings are:  "Worsening bilateral hydronephrosis severe on the left and moderate to severe on the right with hydroureter to the level of the bladder suggesting an acute on chronic component.  There is enlargement of the prostate with a nodular contour superiorly which impresses on the bladder base.  This is likely relates to the prostate rather than a true bladder mass although follow-up with urology for direct visualization is recommended."                    Assessment and Plan:     Bladder outlet obstruction    --Discussed with Dr. Cedeño, images reviewed   --Maintain arenas  --Monitor creatinine overnight   --PSA  --NPO after MN for possible cysto with " retrograde pyelogram +/- stent placement with Dr. Cedeño         VTE Risk Mitigation (From admission, onward)        Ordered     IP VTE HIGH RISK PATIENT  Once      03/11/19 1343     Place ROSEANNA hose  Until discontinued      03/11/19 1343     Place sequential compression device  Until discontinued      03/11/19 1343          Thank you for your consult. I will follow-up with patient. Please contact us if you have any additional questions.    Kerrie Pratre NP  Urology  [facility]

## 2019-03-11 NOTE — ASSESSMENT & PLAN NOTE
--Discussed with Dr. Cedeño, images reviewed   --Maintain arenas  --Monitor creatinine overnight   --PSA  --NPO after MN for possible cysto with retrograde pyelogram +/- stent placement with Dr. Cedeño

## 2019-03-11 NOTE — H&P
[facility] Hospital Medicine  History & Physical    Patient Name: Elissa Mullins  MRN: [id number]  Admission Date: 3/11/2019  Attending Physician: Bella Grimaldo MD   Primary Care Provider: Teresa Dominguez MD (Inactive)         Patient information was obtained from patient, past medical records and ER records.     Subjective:     Principal Problem:Bilateral hydronephrosis    Chief Complaint:   Chief Complaint   Patient presents with    Flank Pain     + chronic right sided flank pains w/ increased intermittent pains w/ new onset yesterday. + nausea w/out emesis. + dysuria , denies heamturia or frequency. No fever or chills        HPI: 73 year old male with past medical history of htn, dm came in c/o pain in his right flank area since yesterday.He c/o some nausea and vomiting, no chest pain,shortness of breath, hematuria, fever, chills.He denies dysuria but c/o decreased urination since yesterday.Has been having weaker urinary stream for past few months.He came with left sided flank pain in 12/18 , ct showed Severe left-sided and moderate right-sided hydronephrosis with dilatation of the bilateral ureters to the level of the bladder.  There is soft tissue density identified at the base of the bladder, slightly asymmetric to the left, which may represent the enlarged prostate protruding into the bladder lumen, although secondary bladder neoplasm is not excluded. His pain improved.He had arenas catheter placed in er and was dcd before dc and was supposed to follow with urology but never did.    Ct this admit showed Worsening bilateral hydronephrosis severe on the left and moderate to severe on the right with hydroureter to the level of the bladder suggesting an acute on chronic component.  There is enlargement of the prostate with a nodular contour superiorly which impresses on the bladder base.     Labs showed anthony with creatinine of 4.5.Arenas catheter was placed and not much urine with bladder scan  showing 20 cc.He was admitted with urology consultation.    Past Medical History:   Diagnosis Date    Flank pain, chronic     Right sided, due to injury in 20's per pet    Hyperlipidemia     Hypertension        History reviewed. No pertinent surgical history.    Review of patient's allergies indicates:  No Known Allergies    No current facility-administered medications on file prior to encounter.      Current Outpatient Medications on File Prior to Encounter   Medication Sig    amLODIPine (NORVASC) 5 MG tablet Take 5 mg by mouth once daily.    aspirin (ECOTRIN) 81 MG EC tablet Take 81 mg by mouth once daily.    atenolol (TENORMIN) 50 MG tablet Take 50 mg by mouth once daily.    atorvastatin (LIPITOR) 20 MG tablet Take 20 mg by mouth once daily.    losartan-hydrochlorothiazide 100-12.5 mg (HYZAAR) 100-12.5 mg Tab Take 1 tablet by mouth once daily.    metFORMIN (GLUCOPHAGE) 500 MG tablet Take 500 mg by mouth 2 (two) times daily with meals.     Family History     None        Tobacco Use    Smoking status: Never Smoker    Smokeless tobacco: Never Used   Substance and Sexual Activity    Alcohol use: No     Frequency: Never    Drug use: No    Sexual activity: Not on file     Review of Systems   Constitutional: Negative for chills and fever.   HENT: Negative for sinus pain and trouble swallowing.    Respiratory: Negative for cough and shortness of breath.    Cardiovascular: Negative for chest pain and leg swelling.   Gastrointestinal: Positive for abdominal pain, nausea and vomiting. Negative for diarrhea.   Genitourinary: Positive for difficulty urinating. Negative for dysuria and hematuria.   Musculoskeletal: Negative for arthralgias and joint swelling.   Skin: Negative for rash.   Neurological: Negative for numbness and headaches.   Psychiatric/Behavioral: Negative for confusion.     Objective:     Vital Signs (Most Recent):  Temp: 96.5 °F (35.8 °C) (03/11/19 1545)  Pulse: (!) 56 (03/11/19 1545)  Resp: 16  (03/11/19 1545)  BP: (!) 192/83 (03/11/19 1545)  SpO2: 100 % (03/11/19 1545) Vital Signs (24h Range):  Temp:  [96.5 °F (35.8 °C)-97.5 °F (36.4 °C)] 96.5 °F (35.8 °C)  Pulse:  [52-60] 56  Resp:  [12-18] 16  SpO2:  [99 %-100 %] 100 %  BP: (182-200)/(81-98) 192/83     Weight: 70.1 kg (154 lb 8.7 oz)  Body mass index is 25.72 kg/m².    Physical Exam   Constitutional: He is oriented to person, place, and time. No distress.   HENT:   Head: Normocephalic and atraumatic.   Eyes: EOM are normal. Pupils are equal, round, and reactive to light.   Neck: Normal range of motion. Neck supple.   Cardiovascular: Normal rate and regular rhythm.   Pulmonary/Chest: Effort normal and breath sounds normal. No respiratory distress.   Abdominal: Soft. Bowel sounds are normal. He exhibits no distension. There is no tenderness.   No cva tenderness    Musculoskeletal: Normal range of motion. He exhibits no edema.   Neurological: He is alert and oriented to person, place, and time. No cranial nerve deficit.   Skin: Skin is warm.   Psychiatric: He has a normal mood and affect.   Vitals reviewed.        CRANIAL NERVES     CN III, IV, VI   Pupils are equal, round, and reactive to light.  Extraocular motions are normal.        Significant Labs:   CBC:   Recent Labs   Lab 03/11/19  0906   WBC 6.04   HGB 10.7*   HCT 31.5*        CMP:   Recent Labs   Lab 03/11/19  0906      K 4.5      CO2 20*   *   BUN 51*   CREATININE 4.5*   CALCIUM 9.2   PROT 8.0   ALBUMIN 4.0   BILITOT 0.3   ALKPHOS 91   AST 19   ALT 11   ANIONGAP 14   EGFRNONAA 12*       Significant Imaging:  CT Renal Stone Study ABD Pelvis WO  Narrative: EXAMINATION:  CT RENAL STONE STUDY ABD PELVIS WO    CLINICAL HISTORY:  Flank pain, stone disease suspected;    TECHNIQUE:  Low dose axial images, sagittal and coronal reformations were obtained from the lung bases to the pubic symphysis.  Contrast was not administered.    COMPARISON:  12/01/2018    FINDINGS:  Visualized  portions of the lung bases and heart demonstrate no acute abnormalities.  Stable subcentimeter soft tissue densities adjacent to the lower thoracic esophagus.    Subcentimeter left hepatic hypodensity is too small to characterize but appears stable.  There is a right hepatic lobe granuloma.  Gallbladder shows no evidence of radiopaque Stones or inflammatory changes.  Stomach, spleen, pancreas and adrenal glands demonstrate no significant abnormality.    The small and large bowel show no evidence of obstruction.  No free air or ascites.  No inflammatory changes in the right lower quadrant.    There is worsening hydronephrosis bilaterally which is severe on the left and moderate to severe on the right.  There is dilatation of the ureters to the urinary bladder which is nondistended.  There is nodular enlargement of the prostate which impresses on the bladder base.  This is favored to represent prostatomegaly rather than a true bladder mass.  Seminal vesicles are mildly enlarged but appears symmetric.  Mildly enlarged iliac chain lymph nodes on the left similar to prior.    Aorta is normal in caliber with mild atherosclerosis.  Bones demonstrate degenerative changes with no acute bony abnormalities.  Impression: Worsening bilateral hydronephrosis severe on the left and moderate to severe on the right with hydroureter to the level of the bladder suggesting an acute on chronic component.  There is enlargement of the prostate with a nodular contour superiorly which impresses on the bladder base.  This is likely relates to the prostate rather than a true bladder mass although follow-up with urology for direct visualization is recommended.    This report was flagged in Epic as abnormal.    Electronically signed by: Tuan Pittman MD  Date:    03/11/2019  Time:    11:15        Assessment/Plan:     * Bilateral hydronephrosis    Worsening hydronephrosis from prior  Continue arenas catheter, not much urine  Urology consulted ,  plan for cytoscope in am       Hyperlipidemia    Continue statin       Essential hypertension    Continue norvasc, atenolol  Hold hctz, losartan with aleida       Type 2 diabetes mellitus, without long-term current use of insulin    Hold metformin with aleida  iss       Metabolic acidosis    Likely due to aleida  Will start iv bicarb drip and monitor labs         ALEIDA (acute kidney injury)    Secondary to obstruction  ivf and monitor  Repeat labs this evening         VTE Risk Mitigation (From admission, onward)        Ordered     IP VTE HIGH RISK PATIENT  Once      03/11/19 1343     Place ROSEANNA hose  Until discontinued      03/11/19 1343     Place sequential compression device  Until discontinued      03/11/19 1343             Bella Grimaldo MD  Department of Hospital Medicine   [facility]

## 2019-03-11 NOTE — ASSESSMENT & PLAN NOTE
Worsening hydronephrosis from prior  Continue arenas catheter, not much urine  Urology consulted , plan for cytoscope in am

## 2019-03-11 NOTE — ED PROVIDER NOTES
Encounter Date: 3/11/2019    SCRIBE #1 NOTE: IMarquita, am scribing for, and in the presence of, Dr. Palacios.       History     Chief Complaint   Patient presents with    Flank Pain     + chronic right sided flank pains w/ increased intermittent pains w/ new onset yesterday. + nausea w/out emesis. + dysuria , denies heamturia or frequency. No fever or chills     Time seen by provider: 8:39 AM    This is a 73 y.o. male who presents with complaint of right flank pain. He had a presentation in 12/2018 for similar symptoms at which time he was diagnosed with bilateral hydronephrosis with soft tissue density at the base of the bladder worse on the left side concerning for enlarged prostate versus neoplasm. Patient was referred to urology at that time, though denies following up with them. He reports pain recurred at 6:00 AM yesterday. Pain improves after bowel movements. He denies recent heavy lifting or trauma. He reports nausea without vomiting and difficulty urinating. He denies unexpected weight loss, diarrhea, constipation, or decreased urine output.      The history is provided by the patient and medical records.     Review of patient's allergies indicates:  No Known Allergies  Past Medical History:   Diagnosis Date    Flank pain, chronic     Right sided, due to injury in 20's per pet    Hyperlipidemia     Hypertension      History reviewed. No pertinent surgical history.  History reviewed. No pertinent family history.  Social History     Tobacco Use    Smoking status: Never Smoker    Smokeless tobacco: Never Used   Substance Use Topics    Alcohol use: No     Frequency: Never    Drug use: No     Review of Systems   Constitutional: Negative for fever and unexpected weight change.   HENT: Negative for sore throat.    Respiratory: Negative for shortness of breath.    Cardiovascular: Negative for chest pain.   Gastrointestinal: Positive for nausea. Negative for constipation, diarrhea and vomiting.    Genitourinary: Positive for difficulty urinating and flank pain. Negative for decreased urine volume and dysuria.   Musculoskeletal: Negative for back pain.   Skin: Negative for rash.   Neurological: Negative for weakness.   Hematological: Does not bruise/bleed easily.       Physical Exam     Initial Vitals [03/11/19 0810]   BP Pulse Resp Temp SpO2   (!) 200/98 60 12 97.5 °F (36.4 °C) 100 %      MAP       --         Physical Exam    Nursing note and vitals reviewed.  Constitutional: He appears well-developed and well-nourished.   HENT:   Head: Normocephalic and atraumatic.   Eyes: Conjunctivae and EOM are normal. Pupils are equal, round, and reactive to light.   Neck: Normal range of motion. Neck supple.   Cardiovascular: Normal rate, regular rhythm, normal heart sounds and intact distal pulses. Exam reveals no gallop and no friction rub.    No murmur heard.  Pulmonary/Chest: Breath sounds normal. No stridor. No respiratory distress. He has no wheezes. He has no rhonchi. He has no rales. He exhibits no tenderness.   Abdominal: Soft. Bowel sounds are normal. There is no tenderness. There is no rebound and no guarding.   Fullness in suprapubic area.   Musculoskeletal: Normal range of motion. He exhibits tenderness (right flank).   Neurological: He is alert and oriented to person, place, and time. He has normal strength. No cranial nerve deficit. GCS score is 15. GCS eye subscore is 4. GCS verbal subscore is 5. GCS motor subscore is 6.   Skin: Skin is warm and dry. Capillary refill takes less than 2 seconds.   Psychiatric: He has a normal mood and affect.         ED Course   Critical Care  Date/Time: 3/11/2019 11:24 AM  Performed by: Khushi Palacios MD  Authorized by: Khushi Palacios MD   Direct patient critical care time: 15 minutes  Additional history critical care time: 10 minutes  Ordering / reviewing critical care time: 10 minutes  Documentation critical care time: 10 minutes  Consulting other physicians critical  care time: 10 minutes  Total critical care time (exclusive of procedural time) : 55 minutes  Critical care time was exclusive of separately billable procedures and treating other patients and teaching time.  Critical care was necessary to treat or prevent imminent or life-threatening deterioration of the following conditions: renal failure.  Critical care was time spent personally by me on the following activities: examination of patient, review of old charts, obtaining history from patient or surrogate, ordering and performing treatments and interventions, ordering and review of laboratory studies, ordering and review of radiographic studies, evaluation of patient's response to treatment, re-evaluation of patient's condition and development of treatment plan with patient or surrogate.        Labs Reviewed   URINALYSIS, REFLEX TO URINE CULTURE - Abnormal; Notable for the following components:       Result Value    Protein, UA Trace (*)     Occult Blood UA Trace (*)     All other components within normal limits    Narrative:     Preferred Collection Type->Urine, Clean Catch   CBC W/ AUTO DIFFERENTIAL - Abnormal; Notable for the following components:    RBC 3.38 (*)     Hemoglobin 10.7 (*)     Hematocrit 31.5 (*)     MCH 31.7 (*)     Mono # 0.2 (*)     Gran% 77.6 (*)     Mono% 3.8 (*)     All other components within normal limits   COMPREHENSIVE METABOLIC PANEL - Abnormal; Notable for the following components:    CO2 20 (*)     Glucose 157 (*)     BUN, Bld 51 (*)     Creatinine 4.5 (*)     eGFR if  14 (*)     eGFR if non  12 (*)     All other components within normal limits   URINALYSIS, REFLEX TO URINE CULTURE - Abnormal; Notable for the following components:    Protein, UA Trace (*)     Occult Blood UA Trace (*)     All other components within normal limits    Narrative:     Preferred Collection Type->Urine, Clean Catch          Imaging Results          CT Renal Stone Study ABD Pelvis  WO (Final result)     Abnormal  Result time 03/11/19 11:15:33    Final result by Tuan Pittman MD (03/11/19 11:15:33)                 Impression:      Worsening bilateral hydronephrosis severe on the left and moderate to severe on the right with hydroureter to the level of the bladder suggesting an acute on chronic component.  There is enlargement of the prostate with a nodular contour superiorly which impresses on the bladder base.  This is likely relates to the prostate rather than a true bladder mass although follow-up with urology for direct visualization is recommended.    This report was flagged in Epic as abnormal.      Electronically signed by: Tuan Pittman MD  Date:    03/11/2019  Time:    11:15             Narrative:    EXAMINATION:  CT RENAL STONE STUDY ABD PELVIS WO    CLINICAL HISTORY:  Flank pain, stone disease suspected;    TECHNIQUE:  Low dose axial images, sagittal and coronal reformations were obtained from the lung bases to the pubic symphysis.  Contrast was not administered.    COMPARISON:  12/01/2018    FINDINGS:  Visualized portions of the lung bases and heart demonstrate no acute abnormalities.  Stable subcentimeter soft tissue densities adjacent to the lower thoracic esophagus.    Subcentimeter left hepatic hypodensity is too small to characterize but appears stable.  There is a right hepatic lobe granuloma.  Gallbladder shows no evidence of radiopaque Stones or inflammatory changes.  Stomach, spleen, pancreas and adrenal glands demonstrate no significant abnormality.    The small and large bowel show no evidence of obstruction.  No free air or ascites.  No inflammatory changes in the right lower quadrant.    There is worsening hydronephrosis bilaterally which is severe on the left and moderate to severe on the right.  There is dilatation of the ureters to the urinary bladder which is nondistended.  There is nodular enlargement of the prostate which impresses on the bladder base.  This is  favored to represent prostatomegaly rather than a true bladder mass.  Seminal vesicles are mildly enlarged but appears symmetric.  Mildly enlarged iliac chain lymph nodes on the left similar to prior.    Aorta is normal in caliber with mild atherosclerosis.  Bones demonstrate degenerative changes with no acute bony abnormalities.                                 Medical Decision Making:   Initial Assessment:   73 y.o. male with right sided flank pain with history of prior hydronephrosis with unclear etiology. Plan labs, CT, analgesics, and bladder scan.  Differential Diagnosis:   Differential diagnosis includes, but is not limited to:  Musculoskeletal causes, kidney stone, pyelonephritis, shingles, and intra-abdominal causes such as diverticulitis and appendicitis, aortic dissection, abdominal aortic aneurysm, colitis, PE, pneumonia.  Clinical Tests:   Lab Tests: Ordered and Reviewed  Radiological Study: Ordered and Reviewed  ED Management:  11:09 AM - Contacted case management for utilization review.  11:30 AM - Called Urology Dr. Martinez. Will place inpatient consultation.  11:37 AM - Discussed case with Dr. Bass, and will admit patient to Dr. Grimaldo.  11:45 AM - Dr. Martinez not on call.  Discussed case with Dr. Cedeño. Will consult as inpatient. Agreed with management.            Scribe Attestation:   Scribe #1: I performed the above scribed service and the documentation accurately describes the services I performed. I attest to the accuracy of the note.    Attending Attestation:           Physician Attestation for Scribe:  Physician Attestation Statement for Scribe #1: I, Dr. Palacios, reviewed documentation, as scribed by Marquita Kingsley in my presence, and it is both accurate and complete.                    Clinical Impression:     1. Bilateral hydronephrosis    2. Bladder outlet obstruction    3. Type 2 diabetes mellitus without complication, without long-term current use of insulin    4. ALEIDA (acute kidney  injury)    5. Essential hypertension                                 Khushi Palacios MD  03/11/19 1063

## 2019-03-11 NOTE — HPI
"Mr. Mullins is a 73 y.o. male who presented to the ED with right flank pain that began this morning. Creatinine on admit was noted to be severely elevated at 4.5 with stable electrolytes. UA with only trace protein and RBCs. CT scan revealed "worsening bilateral hydronephrosis severe on the left and moderate to severe on the right with hydroureter to the level of the bladder suggesting an acute on chronic component.  There is enlargement of the prostate with a nodular contour superiorly which impresses on the bladder base." Upon chart review, it appears the patient presented with similar symptoms in December 2018; however did not follow up as instructed. At the time he had moderate-severe bilateral hydro and a creatinine level of 1.5. Urology was consulted for evaluation.      He reports an aching right flank pain with radiation to the abdomen that began this morning. He also reports the "feeling of incomplete bladder emptying" and urinary frequency that began months ago. Garcia was placed on admit with only 20 mL of urine output. Denies a slow stream, dysuria, hematuria, and fever/chills. + nausea that began last night; however denies vomiting. Denies a history of recurrent UTIs and nephrolithiasis. Denies a personal/family history of prostate cancer. Denies constipation, last BM yesterday.     "

## 2019-03-11 NOTE — PLAN OF CARE
Problem: Adult Inpatient Plan of Care  Goal: Plan of Care Review  Outcome: Ongoing (interventions implemented as appropriate)  Plan of care reviewed with patient.  Pain managed with prn medications.  Garcia in place but patient is not producing urine.  Patient repositions independently.  Purposeful rounding completed, call bell within reach, no needs at this time.

## 2019-03-11 NOTE — NURSING
Urology called to report patient lack of urine this shift.  Urology aware, prn arenas irrigation ordered for signs of arenas occlusion.

## 2019-03-12 ENCOUNTER — ANESTHESIA EVENT (OUTPATIENT)
Dept: SURGERY | Facility: OTHER | Age: 74
DRG: 660 | End: 2019-03-12
Payer: MEDICARE

## 2019-03-12 ENCOUNTER — ANESTHESIA (OUTPATIENT)
Dept: SURGERY | Facility: OTHER | Age: 74
DRG: 660 | End: 2019-03-12
Payer: MEDICARE

## 2019-03-12 PROBLEM — R97.20 ELEVATED PSA: Status: ACTIVE | Noted: 2019-03-12

## 2019-03-12 LAB
ANION GAP SERPL CALC-SCNC: 13 MMOL/L
BASOPHILS # BLD AUTO: 0 K/UL
BASOPHILS NFR BLD: 0 %
BUN SERPL-MCNC: 59 MG/DL
CALCIUM SERPL-MCNC: 8.1 MG/DL
CHLORIDE SERPL-SCNC: 101 MMOL/L
CO2 SERPL-SCNC: 24 MMOL/L
CREAT SERPL-MCNC: 6.5 MG/DL
DIFFERENTIAL METHOD: ABNORMAL
EOSINOPHIL # BLD AUTO: 0.1 K/UL
EOSINOPHIL NFR BLD: 1.3 %
ERYTHROCYTE [DISTWIDTH] IN BLOOD BY AUTOMATED COUNT: 13.5 %
EST. GFR  (AFRICAN AMERICAN): 9 ML/MIN/1.73 M^2
EST. GFR  (NON AFRICAN AMERICAN): 8 ML/MIN/1.73 M^2
ESTIMATED AVG GLUCOSE: 143 MG/DL
GLUCOSE SERPL-MCNC: 138 MG/DL
HBA1C MFR BLD HPLC: 6.6 %
HCT VFR BLD AUTO: 26.4 %
HGB BLD-MCNC: 9 G/DL
LYMPHOCYTES # BLD AUTO: 1.4 K/UL
LYMPHOCYTES NFR BLD: 25.7 %
MCH RBC QN AUTO: 31.3 PG
MCHC RBC AUTO-ENTMCNC: 34.1 G/DL
MCV RBC AUTO: 92 FL
MONOCYTES # BLD AUTO: 0.5 K/UL
MONOCYTES NFR BLD: 9.2 %
NEUTROPHILS # BLD AUTO: 3.5 K/UL
NEUTROPHILS NFR BLD: 63.6 %
PHOSPHATE SERPL-MCNC: 5.7 MG/DL
PLATELET # BLD AUTO: 239 K/UL
PMV BLD AUTO: 10.3 FL
POCT GLUCOSE: 152 MG/DL (ref 70–110)
POCT GLUCOSE: 174 MG/DL (ref 70–110)
POCT GLUCOSE: 254 MG/DL (ref 70–110)
POCT GLUCOSE: 274 MG/DL (ref 70–110)
POCT GLUCOSE: 318 MG/DL (ref 70–110)
POTASSIUM SERPL-SCNC: 3.5 MMOL/L
RBC # BLD AUTO: 2.88 M/UL
SODIUM SERPL-SCNC: 138 MMOL/L
WBC # BLD AUTO: 5.52 K/UL

## 2019-03-12 PROCEDURE — 25000003 PHARM REV CODE 250: Performed by: NURSE ANESTHETIST, CERTIFIED REGISTERED

## 2019-03-12 PROCEDURE — 74420 PR  X-RAY RETROGRADE PYELOGRAM: ICD-10-PCS | Mod: 26,,, | Performed by: UROLOGY

## 2019-03-12 PROCEDURE — 76000 PR  FLUOROSCOPE EXAMINATION: ICD-10-PCS | Mod: 26,59,, | Performed by: UROLOGY

## 2019-03-12 PROCEDURE — 80048 BASIC METABOLIC PNL TOTAL CA: CPT

## 2019-03-12 PROCEDURE — 99233 SBSQ HOSP IP/OBS HIGH 50: CPT | Mod: ,,, | Performed by: HOSPITALIST

## 2019-03-12 PROCEDURE — 85025 COMPLETE CBC W/AUTO DIFF WBC: CPT

## 2019-03-12 PROCEDURE — 99233 PR SUBSEQUENT HOSPITAL CARE,LEVL III: ICD-10-PCS | Mod: ,,, | Performed by: HOSPITALIST

## 2019-03-12 PROCEDURE — 52332 CYSTOSCOPY AND TREATMENT: CPT | Mod: 51,50,, | Performed by: UROLOGY

## 2019-03-12 PROCEDURE — 63600175 PHARM REV CODE 636 W HCPCS: Performed by: INTERNAL MEDICINE

## 2019-03-12 PROCEDURE — 36000706: Performed by: UROLOGY

## 2019-03-12 PROCEDURE — 25000003 PHARM REV CODE 250: Performed by: HOSPITALIST

## 2019-03-12 PROCEDURE — 11000001 HC ACUTE MED/SURG PRIVATE ROOM

## 2019-03-12 PROCEDURE — C1758 CATHETER, URETERAL: HCPCS | Performed by: UROLOGY

## 2019-03-12 PROCEDURE — C2617 STENT, NON-COR, TEM W/O DEL: HCPCS | Performed by: UROLOGY

## 2019-03-12 PROCEDURE — 25500020 PHARM REV CODE 255: Performed by: UROLOGY

## 2019-03-12 PROCEDURE — 37000009 HC ANESTHESIA EA ADD 15 MINS: Performed by: UROLOGY

## 2019-03-12 PROCEDURE — 37000008 HC ANESTHESIA 1ST 15 MINUTES: Performed by: UROLOGY

## 2019-03-12 PROCEDURE — C1769 GUIDE WIRE: HCPCS | Performed by: UROLOGY

## 2019-03-12 PROCEDURE — 27000221 HC OXYGEN, UP TO 24 HOURS

## 2019-03-12 PROCEDURE — 94761 N-INVAS EAR/PLS OXIMETRY MLT: CPT

## 2019-03-12 PROCEDURE — 36415 COLL VENOUS BLD VENIPUNCTURE: CPT

## 2019-03-12 PROCEDURE — 36000707: Performed by: UROLOGY

## 2019-03-12 PROCEDURE — 84100 ASSAY OF PHOSPHORUS: CPT

## 2019-03-12 PROCEDURE — 52332 PR CYSTOSCOPY,INSERT URETERAL STENT: ICD-10-PCS | Mod: 51,50,, | Performed by: UROLOGY

## 2019-03-12 PROCEDURE — 76000 FLUOROSCOPY <1 HR PHYS/QHP: CPT | Mod: 26,59,, | Performed by: UROLOGY

## 2019-03-12 PROCEDURE — 52351 CYSTOURETERO & OR PYELOSCOPE: CPT | Mod: ,,, | Performed by: UROLOGY

## 2019-03-12 PROCEDURE — 71000033 HC RECOVERY, INTIAL HOUR: Performed by: UROLOGY

## 2019-03-12 PROCEDURE — 63600175 PHARM REV CODE 636 W HCPCS: Performed by: NURSE ANESTHETIST, CERTIFIED REGISTERED

## 2019-03-12 PROCEDURE — 52351 PR CYSTO/URETERO/PYELOSCOPY, DX: ICD-10-PCS | Mod: ,,, | Performed by: UROLOGY

## 2019-03-12 PROCEDURE — 71000039 HC RECOVERY, EACH ADD'L HOUR: Performed by: UROLOGY

## 2019-03-12 PROCEDURE — 74420 UROGRAPHY RTRGR +-KUB: CPT | Mod: 26,,, | Performed by: UROLOGY

## 2019-03-12 PROCEDURE — 25000003 PHARM REV CODE 250: Performed by: INTERNAL MEDICINE

## 2019-03-12 DEVICE — STENT URETERAL UNIV 6FR 28CM: Type: IMPLANTABLE DEVICE | Site: URETER | Status: FUNCTIONAL

## 2019-03-12 RX ORDER — SODIUM CHLORIDE, SODIUM LACTATE, POTASSIUM CHLORIDE, CALCIUM CHLORIDE 600; 310; 30; 20 MG/100ML; MG/100ML; MG/100ML; MG/100ML
INJECTION, SOLUTION INTRAVENOUS CONTINUOUS PRN
Status: DISCONTINUED | OUTPATIENT
Start: 2019-03-12 | End: 2019-03-12

## 2019-03-12 RX ORDER — DEXAMETHASONE SODIUM PHOSPHATE 4 MG/ML
INJECTION, SOLUTION INTRA-ARTICULAR; INTRALESIONAL; INTRAMUSCULAR; INTRAVENOUS; SOFT TISSUE
Status: DISCONTINUED | OUTPATIENT
Start: 2019-03-12 | End: 2019-03-12

## 2019-03-12 RX ORDER — EPHEDRINE SULFATE 50 MG/ML
INJECTION, SOLUTION INTRAVENOUS
Status: DISCONTINUED | OUTPATIENT
Start: 2019-03-12 | End: 2019-03-12

## 2019-03-12 RX ORDER — ATROPINE SULFATE 1 MG/ML
INJECTION, SOLUTION INTRAMUSCULAR; INTRAVENOUS; SUBCUTANEOUS
Status: DISCONTINUED | OUTPATIENT
Start: 2019-03-12 | End: 2019-03-12

## 2019-03-12 RX ORDER — GLYCOPYRROLATE 0.2 MG/ML
INJECTION INTRAMUSCULAR; INTRAVENOUS
Status: DISCONTINUED | OUTPATIENT
Start: 2019-03-12 | End: 2019-03-12

## 2019-03-12 RX ORDER — ONDANSETRON 2 MG/ML
INJECTION INTRAMUSCULAR; INTRAVENOUS
Status: DISCONTINUED | OUTPATIENT
Start: 2019-03-12 | End: 2019-03-12

## 2019-03-12 RX ORDER — CIPROFLOXACIN 2 MG/ML
INJECTION, SOLUTION INTRAVENOUS
Status: DISCONTINUED | OUTPATIENT
Start: 2019-03-12 | End: 2019-03-12

## 2019-03-12 RX ORDER — SODIUM CHLORIDE, SODIUM LACTATE, POTASSIUM CHLORIDE, CALCIUM CHLORIDE 600; 310; 30; 20 MG/100ML; MG/100ML; MG/100ML; MG/100ML
INJECTION, SOLUTION INTRAVENOUS CONTINUOUS
Status: DISCONTINUED | OUTPATIENT
Start: 2019-03-12 | End: 2019-03-16

## 2019-03-12 RX ORDER — SODIUM CHLORIDE 0.9 % (FLUSH) 0.9 %
3 SYRINGE (ML) INJECTION
Status: DISCONTINUED | OUTPATIENT
Start: 2019-03-12 | End: 2019-03-21 | Stop reason: HOSPADM

## 2019-03-12 RX ORDER — PROPOFOL 10 MG/ML
VIAL (ML) INTRAVENOUS
Status: DISCONTINUED | OUTPATIENT
Start: 2019-03-12 | End: 2019-03-12

## 2019-03-12 RX ORDER — FENTANYL CITRATE 50 UG/ML
INJECTION, SOLUTION INTRAMUSCULAR; INTRAVENOUS
Status: DISCONTINUED | OUTPATIENT
Start: 2019-03-12 | End: 2019-03-12

## 2019-03-12 RX ORDER — LIDOCAINE HCL/PF 100 MG/5ML
SYRINGE (ML) INTRAVENOUS
Status: DISCONTINUED | OUTPATIENT
Start: 2019-03-12 | End: 2019-03-12

## 2019-03-12 RX ADMIN — ATROPINE SULFATE 0.5 MG: 1 INJECTION, SOLUTION INTRAMUSCULAR; INTRAVENOUS; SUBCUTANEOUS at 11:03

## 2019-03-12 RX ADMIN — SODIUM CHLORIDE, SODIUM LACTATE, POTASSIUM CHLORIDE, AND CALCIUM CHLORIDE: .6; .31; .03; .02 INJECTION, SOLUTION INTRAVENOUS at 08:03

## 2019-03-12 RX ADMIN — SODIUM BICARBONATE: 84 INJECTION, SOLUTION INTRAVENOUS at 03:03

## 2019-03-12 RX ADMIN — FENTANYL CITRATE 25 MCG: 50 INJECTION, SOLUTION INTRAMUSCULAR; INTRAVENOUS at 11:03

## 2019-03-12 RX ADMIN — CIPROFLOXACIN 200 MG: 2 INJECTION INTRAVENOUS at 11:03

## 2019-03-12 RX ADMIN — ATORVASTATIN CALCIUM 20 MG: 20 TABLET, FILM COATED ORAL at 09:03

## 2019-03-12 RX ADMIN — HYDROCODONE BITARTRATE AND ACETAMINOPHEN 1 TABLET: 5; 325 TABLET ORAL at 02:03

## 2019-03-12 RX ADMIN — ATENOLOL 50 MG: 25 TABLET ORAL at 09:03

## 2019-03-12 RX ADMIN — PROPOFOL 140 MG: 10 INJECTION, EMULSION INTRAVENOUS at 10:03

## 2019-03-12 RX ADMIN — ONDANSETRON 4 MG: 2 INJECTION INTRAMUSCULAR; INTRAVENOUS at 12:03

## 2019-03-12 RX ADMIN — SODIUM CHLORIDE, SODIUM LACTATE, POTASSIUM CHLORIDE, AND CALCIUM CHLORIDE: 600; 310; 30; 20 INJECTION, SOLUTION INTRAVENOUS at 10:03

## 2019-03-12 RX ADMIN — SODIUM BICARBONATE: 84 INJECTION, SOLUTION INTRAVENOUS at 02:03

## 2019-03-12 RX ADMIN — EPHEDRINE SULFATE 10 MG: 50 INJECTION INTRAMUSCULAR; INTRAVENOUS; SUBCUTANEOUS at 11:03

## 2019-03-12 RX ADMIN — INSULIN ASPART 1 UNITS: 100 INJECTION, SOLUTION INTRAVENOUS; SUBCUTANEOUS at 10:03

## 2019-03-12 RX ADMIN — AMLODIPINE BESYLATE 5 MG: 5 TABLET ORAL at 09:03

## 2019-03-12 RX ADMIN — FENTANYL CITRATE 100 MCG: 50 INJECTION, SOLUTION INTRAMUSCULAR; INTRAVENOUS at 10:03

## 2019-03-12 RX ADMIN — DEXAMETHASONE SODIUM PHOSPHATE 4 MG: 4 INJECTION, SOLUTION INTRAMUSCULAR; INTRAVENOUS at 10:03

## 2019-03-12 RX ADMIN — GLYCOPYRROLATE 0.2 MG: 0.2 INJECTION, SOLUTION INTRAMUSCULAR; INTRAVENOUS at 10:03

## 2019-03-12 RX ADMIN — LIDOCAINE HYDROCHLORIDE 80 MG: 20 INJECTION, SOLUTION INTRAVENOUS at 10:03

## 2019-03-12 RX ADMIN — CARBOXYMETHYLCELLULOSE SODIUM 2 DROP: 2.5 SOLUTION/ DROPS OPHTHALMIC at 10:03

## 2019-03-12 RX ADMIN — SODIUM CHLORIDE, SODIUM LACTATE, POTASSIUM CHLORIDE, AND CALCIUM CHLORIDE: 600; 310; 30; 20 INJECTION, SOLUTION INTRAVENOUS at 11:03

## 2019-03-12 RX ADMIN — INSULIN ASPART 4 UNITS: 100 INJECTION, SOLUTION INTRAVENOUS; SUBCUTANEOUS at 06:03

## 2019-03-12 NOTE — ANESTHESIA POSTPROCEDURE EVALUATION
"Anesthesia Post Evaluation    Patient: Elissa Mullins    Procedure(s) Performed: Procedure(s) (LRB):  CYSTOURETEROSCOPY, WITH RETROGRADE PYELOGRAM AND URETERAL STENT INSERTION (Bilateral)    Final Anesthesia Type: general  Patient location during evaluation: PACU  Patient participation: Yes- Able to Participate  Level of consciousness: awake and alert  Post-procedure vital signs: reviewed and stable  Pain management: adequate  Airway patency: patent  PONV status at discharge: No PONV  Anesthetic complications: no      Cardiovascular status: blood pressure returned to baseline  Respiratory status: unassisted  Hydration status: euvolemic  Follow-up not needed.        Visit Vitals  /71 (BP Location: Right arm, Patient Position: Lying)   Pulse 63   Temp 36.6 °C (97.8 °F) (Oral)   Resp 18   Ht 5' 5" (1.651 m)   Wt 70.1 kg (154 lb 8.7 oz)   SpO2 (!) 94%   BMI 25.72 kg/m²       Pain/Jeramie Score: Pain Rating Prior to Med Admin: 10 (3/11/2019  4:34 PM)  Pain Rating Post Med Admin: 0 (3/11/2019  5:34 PM)  Jeramie Score: 10 (3/12/2019  1:40 PM)        "

## 2019-03-12 NOTE — ASSESSMENT & PLAN NOTE
Worsening hydronephrosis from prior indicating bladder outlet obstruction  Cystoscopy today with bilateral obstructions relieved with stenting.  Post-obstructive diuresis.  Patient on IVF.

## 2019-03-12 NOTE — PLAN OF CARE
Discharge Planning:  Patient admitted on 3-11-19  LOS-day 1  Chart reviewed, Care plan discussed  Discussed care plan with treatment team,  attending Dr Bro  Consults following are: case mgt, urology  PCP updated in [facility]: yes, [facility]  Pharmacy, updated in [facility]: [facility] [address]  DME at home: self reports none  Current dispo - home soon.  Mr Mullins reports that he will soon move to [place] to live with family there. \  Will discuss final discharge tomorrow if Mr Mullins is discharged  Transportation: usually the [facility]  Case management  to follow       03/12/19 6578   Discharge Assessment   Assessment Type Discharge Planning Assessment   Confirmed/corrected address and phone number on facesheet? Yes   Assessment information obtained from? Patient;Caregiver;Medical Record   Communicated expected length of stay with patient/caregiver yes   Prior to hospitilization cognitive status: Alert/Oriented   Prior to hospitalization functional status: Independent   Current cognitive status: Alert/Oriented   Current Functional Status: Assistive Equipment   Lives With other relative(s)  (his uncle)   Able to Return to Prior Arrangements yes   Is patient able to care for self after discharge? Yes   Patient currently being followed by outpatient case management? No   Patient currently receives any other outside agency services? No   Equipment Currently Used at Home none   Do you have any problems affording any of your prescribed medications? No   Is the patient taking medications as prescribed? yes   Does the patient have transportation home? Yes   Transportation Anticipated family or friend will provide;public transportation   Does the patient receive services at the [facility]? No   Discharge Plan A Home   Patient/Family in Agreement with Plan yes

## 2019-03-12 NOTE — PROGRESS NOTES
"[facility]  Urology  Progress Note    Patient Name: Elissa Mullins  MRN: [id number]  Admission Date: 3/11/2019  Hospital Length of Stay: 1 days  Code Status: Full Code   Attending Provider: Kalpana Bro MD   Primary Care Physician: Teresa Dominguez MD (Inactive)    Subjective:     HPI:  Mr. Mullins is a 73 y.o. male who presented to the ED with right flank pain that began this morning. Creatinine on admit was noted to be severely elevated at 4.5 with stable electrolytes. UA with only trace protein and RBCs. CT scan revealed "worsening bilateral hydronephrosis severe on the left and moderate to severe on the right with hydroureter to the level of the bladder suggesting an acute on chronic component.  There is enlargement of the prostate with a nodular contour superiorly which impresses on the bladder base." Upon chart review, it appears the patient presented with similar symptoms in December 2018; however did not follow up as instructed. At the time he had moderate-severe bilateral hydro and a creatinine level of 1.5. Urology was consulted for evaluation.      He reports an aching right flank pain with radiation to the abdomen that began this morning. He also reports the "feeling of incomplete bladder emptying" and urinary frequency that began months ago. Garcia was placed on admit with only 20 mL of urine output. Denies a slow stream, dysuria, hematuria, and fever/chills. + nausea that began last night; however denies vomiting. Denies a history of recurrent UTIs and nephrolithiasis. Denies a personal/family history of prostate cancer. Denies constipation, last BM yesterday.       Interval History:   VSS, afebrile  Worsening creatinine (6.5) this am   .2  Pt reports voiding around catheter, soaking his gown 3x overnight      Review of Systems   Constitutional: Negative for chills and fever.   Respiratory: Negative for chest tightness and shortness of breath.    Cardiovascular: Negative for " chest pain and palpitations.   Gastrointestinal: Negative for abdominal distention, abdominal pain, constipation, nausea and vomiting.   Genitourinary: Positive for decreased urine volume, flank pain (R>L) and hematuria. Negative for difficulty urinating (arenas), dysuria, frequency and urgency.     Objective:     Temp:  [96.5 °F (35.8 °C)-98.4 °F (36.9 °C)] 98.4 °F (36.9 °C)  Pulse:  [50-64] 62  Resp:  [12-20] 18  SpO2:  [99 %-100 %] 99 %  BP: (149-200)/(65-98) 155/71     Body mass index is 25.72 kg/m².       Bladder Scan Volume (mL): 20 mL (03/11/19 1141)    Drains     Drain                 Urethral Catheter 03/11/19 1140 Coude 16 Fr. less than 1 day                Physical Exam   Constitutional: He is oriented to person, place, and time. He appears well-developed and well-nourished.   HENT:   Head: Normocephalic and atraumatic.   Cardiovascular: Normal rate, regular rhythm and normal heart sounds.    Pulmonary/Chest: Effort normal and breath sounds normal.   Abdominal: Soft. Bowel sounds are normal. He exhibits no distension. There is tenderness in the right upper quadrant and right lower quadrant. There is CVA tenderness (R>L).   Genitourinary:   Genitourinary Comments: Arenas to gravity with clear, yellow urine. Leakage of urine around arenas.    Neurological: He is alert and oriented to person, place, and time.   Skin: Skin is warm and dry.     Psychiatric: He has a normal mood and affect. His behavior is normal.       Significant Labs:    BMP:  Recent Labs   Lab 03/11/19  0906 03/11/19  1732    136   K 4.5 5.6*    106   CO2 20* 19*   BUN 51* 53*   CREATININE 4.5* 5.2*   CALCIUM 9.2 9.0       CBC:   Recent Labs   Lab 03/11/19  0906   WBC 6.04   HGB 10.7*   HCT 31.5*          CMP:   Recent Labs   Lab 03/11/19  0906 03/11/19  1732   * 140*    136   K 4.5 5.6*    106   CO2 20* 19*   BUN 51* 53*   CREATININE 4.5* 5.2*   CALCIUM 9.2 9.0     PSA Test: No results for input(s): PSA  in the last 168 hours.  Urine Culture: No results for input(s): LABURIN in the last 168 hours.  Urine Studies:   Recent Labs   Lab 03/11/19  0906   COLORU Yellow  Yellow   APPEARANCEUA Clear  Clear   PHUR 6.0  6.0   SPECGRAV 1.020  1.020   PROTEINUA Trace*  Trace*   GLUCUA Negative  Negative   KETONESU Negative  Negative   BILIRUBINUA Negative  Negative   OCCULTUA Trace*  Trace*   NITRITE Negative  Negative   UROBILINOGEN Negative  Negative   LEUKOCYTESUR Negative  Negative       Significant Imaging:  All pertinent imaging results/findings from the past 24 hours have been reviewed.                  Assessment/Plan:     * Bilateral hydronephrosis    --Worsening creatinine, now 6.5   --Will proceed with cysto with retrograde pyelogram +/- stent placement with Dr. Cedeño today   --NPO   --Maintain arenas   --.2      Elevated PSA    --.2         VTE Risk Mitigation (From admission, onward)        Ordered     IP VTE HIGH RISK PATIENT  Once      03/11/19 1343     Place ROSEANNA hose  Until discontinued      03/11/19 1343     Place sequential compression device  Until discontinued      03/11/19 1343          Kerrie Prater NP  Urology  [facility]

## 2019-03-12 NOTE — ASSESSMENT & PLAN NOTE
--Worsening creatinine, now 6.5   --Will proceed with cysto with retrograde pyelogram +/- stent placement with Dr. Cedeño today   --NPO   --Maintain arenas   --.2

## 2019-03-12 NOTE — PLAN OF CARE
D/c Plan;  Unable to assess.  In surgery today.       03/12/19 1080   Discharge Assessment   Assessment Type Discharge Planning Assessment   Confirmed/corrected address and phone number on facesheet? No   Assessment information obtained from? Medical Record   Communicated expected length of stay with patient/caregiver no   Is patient able to care for self after discharge? Unable to determine at this time (comments)   Do you have any problems affording any of your prescribed medications? TBD   Patient/Family in Agreement with Plan unable to assess

## 2019-03-12 NOTE — ASSESSMENT & PLAN NOTE
Likely due to ALEIDA  Has been on dextrose with bicarb.  HCO3 level 24 today.  Might change to LR tomorrow.

## 2019-03-12 NOTE — OP NOTE
Operative Note       Surgery Date: 3/12/2019     Surgeon: Yuri Cedeño MD    Assistant Surgeon: None    Pre-op Diagnosis:  Bilateral hydronephrosis [N13.30]    Post-op Diagnosis: Post-Op Diagnosis Codes:     * Bilateral hydronephrosis [N13.30]    Procedures:  BILATERAL URETEROSCOPY, RETROGRADE PYELOGRAM, URETERAL STENT PLACEMENT    Anesthesia: General    Indications for Procedure:  The patient is a 73 y.o. year old male with bilateral hydronephrosis and ALEIDA with elevated PSA suggestive of advanced prostate cancer.  He presents today for surgical treatment with bilateral ureteral stent placement.  The full risks and benefits of the procedure have been explained and detail and he wishes to proceed.    Procedure Description:  The patient was brought to the operative suite and placed under General anesthesia. He was placed in lithotomy position and prepped and draped in usual sterile fashion.  Time out was performed prior to starting the procedure.    Cystoscopy was performed using a 22 Citizen of [place] rigid cystoscope.  The prostate was noted to have an abnormal appearance and was invading the trigone obscuring easy identification of the ureteral orifices.  Using the rigid cystoscope considerable time was spent exploring both sides of the trigone in an effort to identify the UOs.  Attempts were made to cannulate areas that appear to be a UO with a guidewire however these were consistently unsuccessful.    On the left side I was able to partially cannulate and opening his appearance was notably consistent with a typical ureteral orifice.  I was unable to advance the guidewire more than a few cm however.  I elected to perform ureteroscopy in order to better determine if the structure did represent the ureter.  A rigid ureteral scope was used.  The potential UO was identified and after considerable effort was able to be cannulated with the scope.  A motion guidewire was then advanced through the scope and appeared to track  fluoroscopically along the expected course of the ureter however resistance was again met in the distal ureter.  A guidewire was then removed.  A retrograde pyelogram was performed by injecting contrast through the ureteral scope.  This demonstrated an area of significant narrowing in the distal ureter, however contrast was able to reach the ureter proximal to this which appeared dilated and tortuous.  The guidewire was then reinserted in this time was able to advance into the mid ureter.  Additional difficulty was met in this area due to the significant tortuosity of the ureter.  An open-ended ureteral catheter was placed over the wire, however the wire would still not advance.  Repeat retrograde pyelogram confirmed that the wire remained within the lumen of the ureter.  A superstiff wire was passed through the open-ended catheter and this was able to advanced fully into the area of the renal pelvis.  The open-ended catheter was then advanced over the wire and the wire removed.  A final retrograde pyelogram was performed which confirmed that the open-ended catheter was within the renal pelvis.  The wire was then replaced and the catheter removed.  Under fluoroscopic and cystoscopic guidance the wire was then exchanged for 6 x 28 double-J ureteral stent.  The strings were removed prior to placement.  Fluoroscopy indicated appropriate position of the proximal distal curls.    Attention was then turned to the right side.  On cystoscopic examination I again identified that appeared to be a likely ureteral orifice but again I was unable to advance a wire more than 1-2 cm.  Again the rigid ureteroscopy was performed.  The scope was advanced alongside the guidewire.  The visualization demonstrated that this was indeed the distal ureter.  Again the wire was advanced through the scope and this time it passed more easily along the course of the ureter into the renal pelvis.  The wire was exchanged for an open-ended ureteral  catheter and retrograde pyelogram was performed.  This again demonstrated a hydronephrotic renal pelvis and dilated ureter.  Under fluoroscopic guidance the wire was exchanged for a 6 x 28 double-J ureteral stent.  The string again was removed prior to placement.    Final cystoscopy was performed.  This confirmed appropriate curls in both stents within the bladder. There were some blood clots that were evacuated from the bladder however there was no evidence of persistent significant bleeding.  Final fluoroscopic imaging confirmed that both stents were appropriately positioned.  The cystoscope was then removed and a 22 [place] Garcia catheter was placed.    This was a difficult case for which cpt modifier 22 is requested.  Difficulty was encountered in both identifying and in cannulating both the ureteral orifices due to the anatomical abnormalities resulting from the invasive prostate cancer.  Furthermore this abnormal tissue was friable which resulted in significant bleeding.  This limited visibility and further increased the difficulty of the case.  Approximately 90 min were spent in order to successfully cannulate both ureters in properly positioned ureteral stents.    Complications: No    Estimated Blood Loss:  0 cc         Specimens Removed:   Specimen (12h ago, onward)    None          Implants:   Implant Name Type Inv. Item Serial No.  Lot No. LRB No. Used   STENT URETERAL UNIV 6FR 28CM - [id number]  STENT URETERAL UNIV 6FR 28CM  [employer]. [id number] Left 1   STENT URETERAL UNIV 6FR 28CM - [id number]  STENT URETERAL UNIV 6FR 28CM  [employer]. [id number] Right 1              Disposition: PACU - hemodynamically stable.           Condition: Good    Attestation:  I performed the procedure.

## 2019-03-12 NOTE — BRIEF OP NOTE
[facility]  Brief Operative Note     SUMMARY     Surgery Date: 3/12/2019     Surgeon(s) and Role:     * Faraz Cedeño MD - Primary    Assisting Surgeon: None    Pre-op Diagnosis:  Bilateral hydronephrosis [N13.30]    Post-op Diagnosis:  Post-Op Diagnosis Codes:     * Bilateral hydronephrosis [N13.30]    Procedure(s) (LRB):  CYSTOURETEROSCOPY, WITH RETROGRADE PYELOGRAM AND URETERAL STENT INSERTION (Bilateral)    Anesthesia: * No anesthesia type entered *    Description of the findings of the procedure: Abnormal appearing prostate and trigone. UOs difficult to identify and cannulate. Both eventually cannulated with rigid ureteroscope through which wire was advanced into renal pelvis. Both ureters with tortuosity, left much more than right. 6x28 JJ stents placed on both sides. 22 [place] arenas placed.    Estimated Blood Loss: 10cc         Specimens:   Specimen (12h ago, onward)    None

## 2019-03-12 NOTE — TRANSFER OF CARE
"Anesthesia Transfer of Care Note    Patient: Elissa Mullins    Procedure(s) Performed: Procedure(s) (LRB):  CYSTOURETEROSCOPY, WITH RETROGRADE PYELOGRAM AND URETERAL STENT INSERTION (Bilateral)    Patient location: PACU    Anesthesia Type: general    Transport from OR: Transported from OR on 2-3 L/min O2 by NC with adequate spontaneous ventilation    Post pain: adequate analgesia    Post assessment: no apparent anesthetic complications    Post vital signs: stable    Level of consciousness: awake    Nausea/Vomiting: no nausea/vomiting    Complications: none    Transfer of care protocol was followed      Last vitals:   Visit Vitals  BP (!) 155/71 (BP Location: Right arm, Patient Position: Lying)   Pulse 61   Temp 36.9 °C (98.4 °F) (Oral)   Resp 18   Ht 5' 5" (1.651 m)   Wt 70.1 kg (154 lb 8.7 oz)   SpO2 99%   BMI 25.72 kg/m²     "

## 2019-03-12 NOTE — SUBJECTIVE & OBJECTIVE
Interval History:   VSS, afebrile  Worsening creatinine (6.5) this am   .2  Pt reports voiding around catheter, soaking his gown 3x overnight      Review of Systems   Constitutional: Negative for chills and fever.   Respiratory: Negative for chest tightness and shortness of breath.    Cardiovascular: Negative for chest pain and palpitations.   Gastrointestinal: Negative for abdominal distention, abdominal pain, constipation, nausea and vomiting.   Genitourinary: Positive for decreased urine volume, flank pain (R>L) and hematuria. Negative for difficulty urinating (arenas), dysuria, frequency and urgency.     Objective:     Temp:  [96.5 °F (35.8 °C)-98.4 °F (36.9 °C)] 98.4 °F (36.9 °C)  Pulse:  [50-64] 62  Resp:  [12-20] 18  SpO2:  [99 %-100 %] 99 %  BP: (149-200)/(65-98) 155/71     Body mass index is 25.72 kg/m².       Bladder Scan Volume (mL): 20 mL (03/11/19 1141)    Drains     Drain                 Urethral Catheter 03/11/19 1140 Coude 16 Fr. less than 1 day                Physical Exam   Constitutional: He is oriented to person, place, and time. He appears well-developed and well-nourished.   HENT:   Head: Normocephalic and atraumatic.   Cardiovascular: Normal rate, regular rhythm and normal heart sounds.    Pulmonary/Chest: Effort normal and breath sounds normal.   Abdominal: Soft. Bowel sounds are normal. He exhibits no distension. There is tenderness in the right upper quadrant and right lower quadrant. There is CVA tenderness (R>L).   Genitourinary:   Genitourinary Comments: Arenas to gravity with clear, yellow urine. Leakage of urine around arenas.    Neurological: He is alert and oriented to person, place, and time.   Skin: Skin is warm and dry.     Psychiatric: He has a normal mood and affect. His behavior is normal.       Significant Labs:    BMP:  Recent Labs   Lab 03/11/19  0906 03/11/19  1732    136   K 4.5 5.6*    106   CO2 20* 19*   BUN 51* 53*   CREATININE 4.5* 5.2*   CALCIUM 9.2  9.0       CBC:   Recent Labs   Lab 03/11/19  0906   WBC 6.04   HGB 10.7*   HCT 31.5*          CMP:   Recent Labs   Lab 03/11/19  0906 03/11/19  1732   * 140*    136   K 4.5 5.6*    106   CO2 20* 19*   BUN 51* 53*   CREATININE 4.5* 5.2*   CALCIUM 9.2 9.0     PSA Test: No results for input(s): PSA in the last 168 hours.  Urine Culture: No results for input(s): LABURIN in the last 168 hours.  Urine Studies:   Recent Labs   Lab 03/11/19  0906   COLORU Yellow  Yellow   APPEARANCEUA Clear  Clear   PHUR 6.0  6.0   SPECGRAV 1.020  1.020   PROTEINUA Trace*  Trace*   GLUCUA Negative  Negative   KETONESU Negative  Negative   BILIRUBINUA Negative  Negative   OCCULTUA Trace*  Trace*   NITRITE Negative  Negative   UROBILINOGEN Negative  Negative   LEUKOCYTESUR Negative  Negative       Significant Imaging:  All pertinent imaging results/findings from the past 24 hours have been reviewed.

## 2019-03-12 NOTE — ANESTHESIA PREPROCEDURE EVALUATION
03/12/2019  Elissa Mullins is a 73 y.o., male.    Anesthesia Evaluation    I have reviewed the Patient Summary Reports.    I have reviewed the Nursing Notes.   I have reviewed the Medications.     Review of Systems  Anesthesia Hx:  No problems with previous Anesthesia    Social:  Non-Smoker    Cardiovascular:   Hypertension, well controlled    Pulmonary:  Pulmonary Normal    Renal/:   Chronic Renal Disease, CRI    Endocrine:   Diabetes, well controlled        Physical Exam  General:  Obesity    Airway/Jaw/Neck:  Airway Findings: Mouth Opening: Normal Tongue: Normal  General Airway Assessment: Adult  Mallampati: II  TM Distance: Normal, at least 6 cm  Jaw/Neck Findings:     Neck ROM: Normal ROM      Dental:  Dental Findings:              Anesthesia Plan  Type of Anesthesia, risks & benefits discussed:  Anesthesia Type:  general  Patient's Preference:   Intra-op Monitoring Plan:   Intra-op Monitoring Plan Comments:   Post Op Pain Control Plan:   Post Op Pain Control Plan Comments:   Induction:   IV  Beta Blocker:         Informed Consent: Patient understands risks and agrees with Anesthesia plan.  Questions answered. Anesthesia consent signed with patient.  ASA Score: 3     Day of Surgery Review of History & Physical:    H&P update referred to the surgeon.         Ready For Surgery From Anesthesia Perspective.

## 2019-03-12 NOTE — HOSPITAL COURSE
Patient was admitted with Garcia in placed for bladder outlet obstruction and urology was consulted.  Noted markedly elevated PSA .2, and other findings consistent with advanced prostate cancer, although he has not had a biopsy yet.  Cystoscopy was planned with retrograde pyelogram and possible stent placement with Dr. Cedeño.  On 3/12 he underwent bilateral ureteroscopy, retrograde pyelogram and bilateral ureteral stent placement.  He was returned to the floor with Garcia in place and after relief of the obstruction had a considerable post-obstructive diuresis.  He remained on IV fluids while his fluid balance improved.  Voiding trial done 3/14. Garcia was dcd. He continued to diurese until fluid balance was -11 liters.  At that point nephrology was consulted for assistance with fluids.  IV fluids were decreased and the diuresis started to improve, but he became nauseated with vomiting over the weekend 3/16-17, and BUN/creatinine started to increase again.  Bladder scans showed minimal urine in the bladder, and retroperitoneal ultrasound showed persistent moderate right and moderate-severe left hydronephrosis.  IR was consulted to place percutaneous nephrostomy tubes and bilateral tube placement placed on 3/18/19, creatinine started to improve.    Patient started on casodex, bone scan showed Increased tracer accumulation within the T8 and T10 vertebra, right iliac bone, right superior pubic ramus, within the anterior left 4th rib, and overlying the SI joints bilaterally strongly suggestive of osseous metastatic disease.He has an appointment with urology on Monday for prostate biopsy.    His creatinine was improved to 2.1.He still had good bit of output from urostomy tubes and was advised to drink plenty of fluids.Blood pressure was running high and norvasc was changed to procardia and atenolol continued.He was discharged in stable condition with home health.

## 2019-03-13 PROBLEM — E87.20 METABOLIC ACIDOSIS: Status: RESOLVED | Noted: 2019-03-11 | Resolved: 2019-03-13

## 2019-03-13 PROBLEM — R35.89 POSTOBSTRUCTIVE DIURESIS: Status: ACTIVE | Noted: 2019-03-13

## 2019-03-13 LAB
ANION GAP SERPL CALC-SCNC: 11 MMOL/L
BASOPHILS # BLD AUTO: 0 K/UL
BASOPHILS NFR BLD: 0 %
BUN SERPL-MCNC: 43 MG/DL
CALCIUM SERPL-MCNC: 8.6 MG/DL
CHLORIDE SERPL-SCNC: 100 MMOL/L
CO2 SERPL-SCNC: 29 MMOL/L
CREAT SERPL-MCNC: 4.4 MG/DL
DIFFERENTIAL METHOD: ABNORMAL
EOSINOPHIL # BLD AUTO: 0 K/UL
EOSINOPHIL NFR BLD: 0.1 %
ERYTHROCYTE [DISTWIDTH] IN BLOOD BY AUTOMATED COUNT: 13.2 %
EST. GFR  (AFRICAN AMERICAN): 15 ML/MIN/1.73 M^2
EST. GFR  (NON AFRICAN AMERICAN): 13 ML/MIN/1.73 M^2
GLUCOSE SERPL-MCNC: 102 MG/DL
HCT VFR BLD AUTO: 27.3 %
HGB BLD-MCNC: 9.3 G/DL
LYMPHOCYTES # BLD AUTO: 1.5 K/UL
LYMPHOCYTES NFR BLD: 19.1 %
MAGNESIUM SERPL-MCNC: 1.7 MG/DL
MCH RBC QN AUTO: 31.3 PG
MCHC RBC AUTO-ENTMCNC: 34.1 G/DL
MCV RBC AUTO: 92 FL
MONOCYTES # BLD AUTO: 0.7 K/UL
MONOCYTES NFR BLD: 9.2 %
NEUTROPHILS # BLD AUTO: 5.7 K/UL
NEUTROPHILS NFR BLD: 71.5 %
PLATELET # BLD AUTO: 243 K/UL
PMV BLD AUTO: 10.6 FL
POCT GLUCOSE: 105 MG/DL (ref 70–110)
POCT GLUCOSE: 165 MG/DL (ref 70–110)
POCT GLUCOSE: 172 MG/DL (ref 70–110)
POCT GLUCOSE: 193 MG/DL (ref 70–110)
POTASSIUM SERPL-SCNC: 3.9 MMOL/L
RBC # BLD AUTO: 2.97 M/UL
SODIUM SERPL-SCNC: 140 MMOL/L
WBC # BLD AUTO: 7.95 K/UL

## 2019-03-13 PROCEDURE — 85025 COMPLETE CBC W/AUTO DIFF WBC: CPT

## 2019-03-13 PROCEDURE — 11000001 HC ACUTE MED/SURG PRIVATE ROOM

## 2019-03-13 PROCEDURE — 25000003 PHARM REV CODE 250: Performed by: INTERNAL MEDICINE

## 2019-03-13 PROCEDURE — 80048 BASIC METABOLIC PNL TOTAL CA: CPT

## 2019-03-13 PROCEDURE — 99233 SBSQ HOSP IP/OBS HIGH 50: CPT | Mod: ,,, | Performed by: NURSE PRACTITIONER

## 2019-03-13 PROCEDURE — 36415 COLL VENOUS BLD VENIPUNCTURE: CPT

## 2019-03-13 PROCEDURE — 83735 ASSAY OF MAGNESIUM: CPT

## 2019-03-13 PROCEDURE — 25000003 PHARM REV CODE 250: Performed by: HOSPITALIST

## 2019-03-13 PROCEDURE — 99233 PR SUBSEQUENT HOSPITAL CARE,LEVL III: ICD-10-PCS | Mod: ,,, | Performed by: HOSPITALIST

## 2019-03-13 PROCEDURE — 99233 PR SUBSEQUENT HOSPITAL CARE,LEVL III: ICD-10-PCS | Mod: ,,, | Performed by: NURSE PRACTITIONER

## 2019-03-13 PROCEDURE — 94761 N-INVAS EAR/PLS OXIMETRY MLT: CPT

## 2019-03-13 PROCEDURE — 99233 SBSQ HOSP IP/OBS HIGH 50: CPT | Mod: ,,, | Performed by: HOSPITALIST

## 2019-03-13 RX ORDER — IBUPROFEN 400 MG/1
400 TABLET ORAL
Status: ON HOLD | COMMUNITY
End: 2019-03-21 | Stop reason: HOSPADM

## 2019-03-13 RX ADMIN — SODIUM CHLORIDE, SODIUM LACTATE, POTASSIUM CHLORIDE, AND CALCIUM CHLORIDE: .6; .31; .03; .02 INJECTION, SOLUTION INTRAVENOUS at 09:03

## 2019-03-13 RX ADMIN — AMLODIPINE BESYLATE 5 MG: 5 TABLET ORAL at 09:03

## 2019-03-13 RX ADMIN — ATENOLOL 50 MG: 25 TABLET ORAL at 09:03

## 2019-03-13 RX ADMIN — SODIUM CHLORIDE, SODIUM LACTATE, POTASSIUM CHLORIDE, AND CALCIUM CHLORIDE: .6; .31; .03; .02 INJECTION, SOLUTION INTRAVENOUS at 03:03

## 2019-03-13 RX ADMIN — SODIUM CHLORIDE, SODIUM LACTATE, POTASSIUM CHLORIDE, AND CALCIUM CHLORIDE: .6; .31; .03; .02 INJECTION, SOLUTION INTRAVENOUS at 11:03

## 2019-03-13 RX ADMIN — ATORVASTATIN CALCIUM 20 MG: 20 TABLET, FILM COATED ORAL at 09:03

## 2019-03-13 RX ADMIN — HYDROCODONE BITARTRATE AND ACETAMINOPHEN 1 TABLET: 5; 325 TABLET ORAL at 02:03

## 2019-03-13 NOTE — ASSESSMENT & PLAN NOTE
Worsening hydronephrosis from prior indicating bladder outlet obstruction  Elevated PSA consistent with prostate cancer.  Will need outpatient prostate biopsy.  Cystoscopy yesterday with bilateral obstructions relieved with stenting.  Post-obstructive diuresis is considerable.  Patient on IVF and has been encouraged to drink fluids.  OK for voiding trial

## 2019-03-13 NOTE — SUBJECTIVE & OBJECTIVE
Interval History:   VSS, afebrile  POD #1 s/p cysto with klaus stent placement  Post obstructive diuresis   Feeling well   Am labs pending     Review of Systems   Constitutional: Negative for chills and fever.   Respiratory: Negative for chest tightness and shortness of breath.    Cardiovascular: Negative for chest pain and palpitations.   Gastrointestinal: Negative for abdominal distention, abdominal pain, nausea and vomiting.   Genitourinary: Positive for hematuria. Negative for difficulty urinating (arenas), dysuria, flank pain, frequency and urgency.     Objective:     Temp:  [97.3 °F (36.3 °C)-98.4 °F (36.9 °C)] 98.4 °F (36.9 °C)  Pulse:  [55-76] 58  Resp:  [16-18] 18  SpO2:  [94 %-100 %] 96 %  BP: (135-184)/(62-88) 168/80     Body mass index is 25.72 kg/m².       Bladder Scan Volume (mL): 20 mL (03/11/19 1141)    Drains     Drain                 Urethral Catheter 03/11/19 1140 Coude 16 Fr. 1 day                Physical Exam   Constitutional: He is oriented to person, place, and time. He appears well-developed and well-nourished.   HENT:   Head: Normocephalic and atraumatic.   Cardiovascular: Normal rate and regular rhythm.    Pulmonary/Chest: Effort normal and breath sounds normal.   Abdominal: Soft. Bowel sounds are normal. He exhibits no distension. There is no tenderness. There is no CVA tenderness.   Genitourinary:   Genitourinary Comments: Arenas gravity with thin, pink urine noted (no clots noted)   Musculoskeletal: Normal range of motion.   Neurological: He is alert and oriented to person, place, and time.   Skin: Skin is warm and dry.     Psychiatric: He has a normal mood and affect. His behavior is normal.       Significant Labs:    BMP:  Recent Labs   Lab 03/11/19  0906 03/11/19  1732 03/12/19  0657    136 138   K 4.5 5.6* 3.5    106 101   CO2 20* 19* 24   BUN 51* 53* 59*   CREATININE 4.5* 5.2* 6.5*   CALCIUM 9.2 9.0 8.1*       CBC:   Recent Labs   Lab 03/11/19  0906 03/12/19  0657   WBC 6.04  5.52   HGB 10.7* 9.0*   HCT 31.5* 26.4*    239       Urine Culture: No results for input(s): LABURIN in the last 168 hours.  Urine Studies:   Recent Labs   Lab 03/11/19  0906   COLORU Yellow  Yellow   APPEARANCEUA Clear  Clear   PHUR 6.0  6.0   SPECGRAV 1.020  1.020   PROTEINUA Trace*  Trace*   GLUCUA Negative  Negative   KETONESU Negative  Negative   BILIRUBINUA Negative  Negative   OCCULTUA Trace*  Trace*   NITRITE Negative  Negative   UROBILINOGEN Negative  Negative   LEUKOCYTESUR Negative  Negative       Significant Imaging:  All pertinent imaging results/findings from the past 24 hours have been reviewed.

## 2019-03-13 NOTE — PLAN OF CARE
Problem: Adult Inpatient Plan of Care  Goal: Plan of Care Review  Outcome: Ongoing (interventions implemented as appropriate)  Pt remains on room air with adequate saturation.

## 2019-03-13 NOTE — ASSESSMENT & PLAN NOTE
--Cysto with klaus stent placement yesterday with Dr. Cedeño. Appears to be related to advanced prostate cancer.   --Tolerating stents well    --Labs pending   --Now with post obstructive diuresis  --VT when okay with primary team

## 2019-03-13 NOTE — PROGRESS NOTES
"[facility]  Urology  Progress Note    Patient Name: Elissa Mullins  MRN: [id number]  Admission Date: 3/11/2019  Hospital Length of Stay: 2 days  Code Status: Full Code   Attending Provider: Kalpana Bro MD   Primary Care Physician: Eddie [facility]    Subjective:     HPI:  Mr. Mullins is a 73 y.o. male who presented to the ED with right flank pain that began this morning. Creatinine on admit was noted to be severely elevated at 4.5 with stable electrolytes. UA with only trace protein and RBCs. CT scan revealed "worsening bilateral hydronephrosis severe on the left and moderate to severe on the right with hydroureter to the level of the bladder suggesting an acute on chronic component.  There is enlargement of the prostate with a nodular contour superiorly which impresses on the bladder base." Upon chart review, it appears the patient presented with similar symptoms in December 2018; however did not follow up as instructed. At the time he had moderate-severe bilateral hydro and a creatinine level of 1.5. Urology was consulted for evaluation.      He reports an aching right flank pain with radiation to the abdomen that began this morning. He also reports the "feeling of incomplete bladder emptying" and urinary frequency that began months ago. Garcia was placed on admit with only 20 mL of urine output. Denies a slow stream, dysuria, hematuria, and fever/chills. + nausea that began last night; however denies vomiting. Denies a history of recurrent UTIs and nephrolithiasis. Denies a personal/family history of prostate cancer. Denies constipation, last BM yesterday.       Interval History:   VSS, afebrile  POD #1 s/p cysto with klaus stent placement  Post obstructive diuresis   Feeling well   Am labs pending     Review of Systems   Constitutional: Negative for chills and fever.   Respiratory: Negative for chest tightness and shortness of breath.    Cardiovascular: Negative for chest " pain and palpitations.   Gastrointestinal: Negative for abdominal distention, abdominal pain, nausea and vomiting.   Genitourinary: Positive for hematuria. Negative for difficulty urinating (arenas), dysuria, flank pain, frequency and urgency.     Objective:     Temp:  [97.3 °F (36.3 °C)-98.4 °F (36.9 °C)] 98.4 °F (36.9 °C)  Pulse:  [55-76] 58  Resp:  [16-18] 18  SpO2:  [94 %-100 %] 96 %  BP: (135-184)/(62-88) 168/80     Body mass index is 25.72 kg/m².       Bladder Scan Volume (mL): 20 mL (03/11/19 1141)    Drains     Drain                 Urethral Catheter 03/11/19 1140 Coude 16 Fr. 1 day                Physical Exam   Constitutional: He is oriented to person, place, and time. He appears well-developed and well-nourished.   HENT:   Head: Normocephalic and atraumatic.   Cardiovascular: Normal rate and regular rhythm.    Pulmonary/Chest: Effort normal and breath sounds normal.   Abdominal: Soft. Bowel sounds are normal. He exhibits no distension. There is no tenderness. There is no CVA tenderness.   Genitourinary:   Genitourinary Comments: Arenas gravity with thin, pink urine noted (no clots noted)   Musculoskeletal: Normal range of motion.   Neurological: He is alert and oriented to person, place, and time.   Skin: Skin is warm and dry.     Psychiatric: He has a normal mood and affect. His behavior is normal.       Significant Labs:    BMP:  Recent Labs   Lab 03/11/19  0906 03/11/19  1732 03/12/19  0657    136 138   K 4.5 5.6* 3.5    106 101   CO2 20* 19* 24   BUN 51* 53* 59*   CREATININE 4.5* 5.2* 6.5*   CALCIUM 9.2 9.0 8.1*       CBC:   Recent Labs   Lab 03/11/19  0906 03/12/19  0657   WBC 6.04 5.52   HGB 10.7* 9.0*   HCT 31.5* 26.4*    239       Urine Culture: No results for input(s): LABURIN in the last 168 hours.  Urine Studies:   Recent Labs   Lab 03/11/19  0906   COLORU Yellow  Yellow   APPEARANCEUA Clear  Clear   PHUR 6.0  6.0   SPECGRAV 1.020  1.020   PROTEINUA Trace*  Trace*   GLUCUA  Negative  Negative   KETONESU Negative  Negative   BILIRUBINUA Negative  Negative   OCCULTUA Trace*  Trace*   NITRITE Negative  Negative   UROBILINOGEN Negative  Negative   LEUKOCYTESUR Negative  Negative       Significant Imaging:  All pertinent imaging results/findings from the past 24 hours have been reviewed.                  Assessment/Plan:     * Bilateral hydronephrosis    --Cysto with klaus stent placement yesterday with Dr. Cedeño. Appears to be related to advanced prostate cancer.   --Tolerating stents well    --Labs pending   --Now with post obstructive diuresis  --VT when okay with primary team      Elevated PSA    --.2  --Will need outpatient prostate biopsy      ALEIDA (acute kidney injury)    --AM labs pending         VTE Risk Mitigation (From admission, onward)        Ordered     IP VTE HIGH RISK PATIENT  Once      03/11/19 1343     Place ROSEANNA hose  Until discontinued      03/11/19 1343     Place sequential compression device  Until discontinued      03/11/19 1343          Kerrie Prater NP  Urology  [facility]

## 2019-03-13 NOTE — PROGRESS NOTES
[facility] Hospital Medicine  Progress Note    Patient Name: Elissa Mullins  MRN: [id number]  Patient Class: IP- Inpatient   Admission Date: 3/11/2019  Length of Stay: 2 days  Attending Physician: Kalpana Bro MD  Primary Care Provider: [facility]        Subjective:     Principal Problem:Bilateral hydronephrosis    HPI:  Mr. Mullins is a 74 year old man who presented with pain in the right flank for one day associated with nausea and vomiting.  He has had urinary frequency but no dysuria, hematuria fever or chills.  His urinary stream has been weaker for the last 2 months.  He presented here with left flank pain in December last year, and a CT done at that time showed severe left-sided and moderate right-sided hydronephrosis with and a soft tissue density thought to be the enlarged prostate protruding  into the bladder lumen.  He had a Garcia placed in the ED with improvement in symptoms and was removed prior to discharge.  He was supposed to follow up with urology but did not.  Repeat CT done this presentation showed worsening bilateral hydronephrosis and enlargement of the prostate.  Labs showed ALEIDA with a creatinine of 4.5.  A Garcia was placed with only 20 mL urine produced.  He was admitted with urology consultation.    Hospital Course:  Patient was admitted with Garcia in placed for bladder outlet obstruction and urology was consulted.  Noted markedly elevated PSA and other findings consistent with advanced prostate cancer, although he has not had a biopsy yet.  Cystoscopy was planned with retrograde pyelogram and possible stent placement with Dr. Cedeño.  On 3/12 he underwent bilateral ureteroscopy, retrograde pyelogram and bilateral ureteral stent placement.  He was returned to the floor with Garcia in place and after relief of the obstruction had a considerable post-obstructive diuresis.  He remained on IV fluids while his fluid balance  improved.              Interval History:  No complaints.  Nearly 5 liters out since yesterday and is almost 2 liters negative since admission.  Vitals are stable and BUN/creatinine improving.    Review of Systems   Constitutional: Negative for chills and fever.   Respiratory: Negative for cough and shortness of breath.    Cardiovascular: Negative for chest pain and palpitations.     Objective:     Vital Signs (Most Recent):  Temp: 98.3 °F (36.8 °C) (03/13/19 1556)  Pulse: 65 (03/13/19 1556)  Resp: 18 (03/13/19 0731)  BP: (!) 147/67 (03/13/19 1556)  SpO2: (!) 93 % (03/13/19 1556) Vital Signs (24h Range):  Temp:  [98 °F (36.7 °C)-98.6 °F (37 °C)] 98.3 °F (36.8 °C)  Pulse:  [55-65] 65  Resp:  [16-18] 18  SpO2:  [93 %-97 %] 93 %  BP: (135-184)/(62-81) 147/67     Weight: 70.1 kg (154 lb 8.7 oz)  Body mass index is 25.72 kg/m².    Intake/Output Summary (Last 24 hours) at 3/13/2019 1818  Last data filed at 3/13/2019 0740  Gross per 24 hour   Intake --   Output 4900 ml   Net -4900 ml      Physical Exam   Constitutional: He is oriented to person, place, and time. He appears well-developed and well-nourished. No distress.   Pleasant.   HENT:   Head: Normocephalic.   Eyes: Conjunctivae are normal. Pupils are equal, round, and reactive to light.   Neck: Neck supple. No thyromegaly present.   Cardiovascular: Normal rate, regular rhythm, normal heart sounds and intact distal pulses. Exam reveals no gallop and no friction rub.   No murmur heard.  Pulmonary/Chest: Effort normal and breath sounds normal.   Abdominal: Soft. Bowel sounds are normal. He exhibits no distension. There is no tenderness.   Musculoskeletal: Normal range of motion. He exhibits no edema.   Lymphadenopathy:     He has no cervical adenopathy.   Neurological: He is alert and oriented to person, place, and time.   Strength equal and symmetric   Skin: Skin is warm and dry. No rash noted.   Psychiatric: He has a normal mood and affect. His behavior is normal.  Thought content normal.       Significant Labs:   BMP:   Recent Labs   Lab 03/13/19  0753         K 3.9      CO2 29   BUN 43*   CREATININE 4.4*   CALCIUM 8.6*   MG 1.7       Significant Imaging: I have reviewed all pertinent imaging results/findings within the past 24 hours.    Assessment/Plan:      * Bilateral hydronephrosis    Worsening hydronephrosis from prior indicating bladder outlet obstruction  Elevated PSA consistent with prostate cancer.  Will need outpatient prostate biopsy.  Cystoscopy yesterday with bilateral obstructions relieved with stenting.  Post-obstructive diuresis is considerable.  Patient on IVF and has been encouraged to drink fluids.  OK for voiding trial      Hyperlipidemia    Continue statin       Essential hypertension    Continue norvasc, atenolol  Hold hctz, losartan with aleida       Type 2 diabetes mellitus, without long-term current use of insulin    Metformin held.  Sliding scale insulin.       ALEIDA (acute kidney injury)    Secondary to obstruction  Continue IVF.  Expect improvement now that obstruction relieved.       VTE Risk Mitigation (From admission, onward)        Ordered     IP VTE HIGH RISK PATIENT  Once      03/11/19 1343     Place ROSEANNA hose  Until discontinued      03/11/19 1343     Place sequential compression device  Until discontinued      03/11/19 1343              Kalpana Khan MD  Department of Hospital Medicine   [facility]

## 2019-03-13 NOTE — PLAN OF CARE
Problem: Adult Inpatient Plan of Care  Goal: Plan of Care Review  Outcome: Ongoing (interventions implemented as appropriate)  O2 not in use at this time.

## 2019-03-13 NOTE — PROGRESS NOTES
"[facility] Hospital Medicine  Progress Note    Patient Name: Elissa Mullins  MRN: [id number]  Patient Class: IP- Inpatient   Admission Date: 3/11/2019  Length of Stay: 1 days  Attending Physician: Kalpana Bro MD  Primary Care Provider: [facility]        Subjective:     Principal Problem:Bilateral hydronephrosis    HPI:  Mr. Mullins is a 74 year old man who presented with pain in the right flank for one day associated with nausea and vomiting.  He has had urinary frequency but no dysuria, hematuria fever or chills.  His urinary stream has been weaker for the last 2 months.  He presented here with left flank pain in December last year, and a CT done at that time showed severe left-sided and moderate right-sided hydronephrosis with and a soft tissue density thought to be the enlarged prostate protruding  into the bladder lumen.  He had a Garcia placed in the ED with improvement in symptoms and was removed prior to discharge.  He was supposed to follow up with urology but did not.  Repeat CT done this presentation showed worsening bilateral hydronephrosis and enlargement of the prostate.  Labs showed ALEIDA with a creatinine of 4.5.  A Garcia was placed with only 20 mL urine produced.  He was admitted with urology consultation.    Hospital Course:  Patient was admitted with Garcia in placed for bladder outlet obstruction and urology was consulted.  Cystoscopy was planned with retrograde pyelogram and possible stent placement with Dr. Cedeño.  On 3/12 he underwent bilateral ureteroscopy, retrograde pyelogram and bilateral ureteral stent placement.  He was returned to the floor with Garcia in place and after relief of the obstruction had a considerable post-obstructive diuresis.              Interval History:  Seen after procedure, states he feels "much better."  Garcia bag has been emptied of more than a liter.  Draining mildly sanguinous urine.    Review of Systems "   Constitutional: Negative for chills and fever.   Respiratory: Negative for cough and shortness of breath.    Cardiovascular: Negative for chest pain and palpitations.     Objective:     Vital Signs (Most Recent):  Temp: 97.3 °F (36.3 °C) (03/12/19 1652)  Pulse: 68 (03/12/19 1652)  Resp: 18 (03/12/19 1432)  BP: 138/69 (03/12/19 1652)  SpO2: 95 % (03/12/19 1652) Vital Signs (24h Range):  Temp:  [96.6 °F (35.9 °C)-98.4 °F (36.9 °C)] 97.3 °F (36.3 °C)  Pulse:  [50-76] 68  Resp:  [18-20] 18  SpO2:  [94 %-100 %] 95 %  BP: (136-192)/(65-98) 138/69     Weight: 70.1 kg (154 lb 8.7 oz)  Body mass index is 25.72 kg/m².    Intake/Output Summary (Last 24 hours) at 3/12/2019 1935  Last data filed at 3/12/2019 1340  Gross per 24 hour   Intake 1350 ml   Output 200 ml   Net 1150 ml      Physical Exam   Constitutional: He is oriented to person, place, and time. He appears well-developed and well-nourished. No distress.   Pleasant.     HENT:   Head: Normocephalic.   Eyes: Conjunctivae are normal. Pupils are equal, round, and reactive to light.   Neck: Neck supple. No thyromegaly present.   Cardiovascular: Normal rate, regular rhythm, normal heart sounds and intact distal pulses. Exam reveals no gallop and no friction rub.   No murmur heard.  Pulmonary/Chest: Effort normal and breath sounds normal.   Abdominal: Soft. Bowel sounds are normal. He exhibits no distension. There is no tenderness.   Musculoskeletal: Normal range of motion. He exhibits no edema.   Lymphadenopathy:     He has no cervical adenopathy.   Neurological: He is alert and oriented to person, place, and time.   Strength equal and symmetric   Skin: Skin is warm and dry. No rash noted.   Psychiatric: He has a normal mood and affect. His behavior is normal. Thought content normal.       Significant Labs:   BMP:   Recent Labs   Lab 03/12/19  0657   *      K 3.5      CO2 24   BUN 59*   CREATININE 6.5*   CALCIUM 8.1*       Significant Imaging: I have  reviewed all pertinent imaging results/findings within the past 24 hours.    Assessment/Plan:      * Bilateral hydronephrosis    Worsening hydronephrosis from prior indicating bladder outlet obstruction  Cystoscopy today with bilateral obstructions relieved with stenting.  Post-obstructive diuresis.  Patient on IVF.     Elevated PSA    - Urologist following.       Hyperlipidemia    Continue statin       Essential hypertension    Continue norvasc, atenolol  Hold hctz, losartan with aleida       Type 2 diabetes mellitus, without long-term current use of insulin    Metformin held.  Sliding scale insulin.       Metabolic acidosis    Likely due to ALEIDA  Has been on dextrose with bicarb.  HCO3 level 24 today.  Might change to LR tomorrow.     ALEIDA (acute kidney injury)    Secondary to obstruction  Continue IVF.  Expect improvement now that obstruction relieved.       VTE Risk Mitigation (From admission, onward)        Ordered     IP VTE HIGH RISK PATIENT  Once      03/11/19 1343     Place ROSEANNA hose  Until discontinued      03/11/19 1343     Place sequential compression device  Until discontinued      03/11/19 1343              Kalpana Khan MD  Department of Hospital Medicine   [facility]

## 2019-03-13 NOTE — SUBJECTIVE & OBJECTIVE
"Interval History:  Seen after procedure, states he feels "much better."  Garcia bag has been emptied of more than a liter.  Draining mildly sanguinous urine.    Review of Systems   Constitutional: Negative for chills and fever.   Respiratory: Negative for cough and shortness of breath.    Cardiovascular: Negative for chest pain and palpitations.     Objective:     Vital Signs (Most Recent):  Temp: 97.3 °F (36.3 °C) (03/12/19 1652)  Pulse: 68 (03/12/19 1652)  Resp: 18 (03/12/19 1432)  BP: 138/69 (03/12/19 1652)  SpO2: 95 % (03/12/19 1652) Vital Signs (24h Range):  Temp:  [96.6 °F (35.9 °C)-98.4 °F (36.9 °C)] 97.3 °F (36.3 °C)  Pulse:  [50-76] 68  Resp:  [18-20] 18  SpO2:  [94 %-100 %] 95 %  BP: (136-192)/(65-98) 138/69     Weight: 70.1 kg (154 lb 8.7 oz)  Body mass index is 25.72 kg/m².    Intake/Output Summary (Last 24 hours) at 3/12/2019 1935  Last data filed at 3/12/2019 1340  Gross per 24 hour   Intake 1350 ml   Output 200 ml   Net 1150 ml      Physical Exam   Constitutional: He is oriented to person, place, and time. He appears well-developed and well-nourished. No distress.   Pleasant.     HENT:   Head: Normocephalic.   Eyes: Conjunctivae are normal. Pupils are equal, round, and reactive to light.   Neck: Neck supple. No thyromegaly present.   Cardiovascular: Normal rate, regular rhythm, normal heart sounds and intact distal pulses. Exam reveals no gallop and no friction rub.   No murmur heard.  Pulmonary/Chest: Effort normal and breath sounds normal.   Abdominal: Soft. Bowel sounds are normal. He exhibits no distension. There is no tenderness.   Musculoskeletal: Normal range of motion. He exhibits no edema.   Lymphadenopathy:     He has no cervical adenopathy.   Neurological: He is alert and oriented to person, place, and time.   Strength equal and symmetric   Skin: Skin is warm and dry. No rash noted.   Psychiatric: He has a normal mood and affect. His behavior is normal. Thought content normal. "       Significant Labs:   BMP:   Recent Labs   Lab 03/12/19  0657   *      K 3.5      CO2 24   BUN 59*   CREATININE 6.5*   CALCIUM 8.1*       Significant Imaging: I have reviewed all pertinent imaging results/findings within the past 24 hours.

## 2019-03-13 NOTE — PLAN OF CARE
Problem: Adult Inpatient Plan of Care  Goal: Plan of Care Review  Outcome: Ongoing (interventions implemented as appropriate)  No significant events this shift. Remains free from fall, injury, and skin breakdown. Garcia catheter in place & draining large amounts of bloody urine. VS stable on RA and afebrile. Positions self independently. No C/O pain.Tolerating ordered diet. IV site WNL. LR ringers is continuous @ 150 m/l hr. Plan of care reviewed with patient and all questions answered. Bed low, locked, Call light within reach. Purposeful rounding performed. No other complaints at this time.

## 2019-03-13 NOTE — PLAN OF CARE
Problem: Adult Inpatient Plan of Care  Goal: Plan of Care Review  Outcome: Ongoing (interventions implemented as appropriate)  Pt remained free of falls and injuries throughout shift. IV flushed and infusing. Managed pain with PRN medication. Ambulates without assistance. VSS on room air. Garcia intact and to gravity, urine pink/red in color. Bed low and locked, call light within reach, side rails up X2. Will continue to monitor.

## 2019-03-14 ENCOUNTER — TELEPHONE (OUTPATIENT)
Dept: UROLOGY | Facility: CLINIC | Age: 74
End: 2019-03-14

## 2019-03-14 ENCOUNTER — TELEPHONE (OUTPATIENT)
Dept: UROLOGY | Facility: HOSPITAL | Age: 74
End: 2019-03-14

## 2019-03-14 DIAGNOSIS — R97.20 ELEVATED PSA: Primary | ICD-10-CM

## 2019-03-14 PROBLEM — E83.42 HYPOMAGNESEMIA: Status: ACTIVE | Noted: 2019-03-14

## 2019-03-14 LAB
ANION GAP SERPL CALC-SCNC: 7 MMOL/L
BASOPHILS # BLD AUTO: 0.01 K/UL
BASOPHILS NFR BLD: 0.1 %
BUN SERPL-MCNC: 36 MG/DL
CALCIUM SERPL-MCNC: 8.5 MG/DL
CHLORIDE SERPL-SCNC: 103 MMOL/L
CO2 SERPL-SCNC: 29 MMOL/L
CREAT SERPL-MCNC: 3.1 MG/DL
DIFFERENTIAL METHOD: ABNORMAL
EOSINOPHIL # BLD AUTO: 0.1 K/UL
EOSINOPHIL NFR BLD: 1.3 %
ERYTHROCYTE [DISTWIDTH] IN BLOOD BY AUTOMATED COUNT: 13.3 %
EST. GFR  (AFRICAN AMERICAN): 22 ML/MIN/1.73 M^2
EST. GFR  (NON AFRICAN AMERICAN): 19 ML/MIN/1.73 M^2
GLUCOSE SERPL-MCNC: 102 MG/DL
HCT VFR BLD AUTO: 27.2 %
HGB BLD-MCNC: 9.1 G/DL
LYMPHOCYTES # BLD AUTO: 2 K/UL
LYMPHOCYTES NFR BLD: 26.7 %
MAGNESIUM SERPL-MCNC: 1.5 MG/DL
MCH RBC QN AUTO: 31.2 PG
MCHC RBC AUTO-ENTMCNC: 33.5 G/DL
MCV RBC AUTO: 93 FL
MONOCYTES # BLD AUTO: 0.6 K/UL
MONOCYTES NFR BLD: 8.3 %
NEUTROPHILS # BLD AUTO: 4.8 K/UL
NEUTROPHILS NFR BLD: 63.5 %
PHOSPHATE SERPL-MCNC: 3.1 MG/DL
PLATELET # BLD AUTO: 221 K/UL
PMV BLD AUTO: 10.4 FL
POCT GLUCOSE: 134 MG/DL (ref 70–110)
POCT GLUCOSE: 154 MG/DL (ref 70–110)
POCT GLUCOSE: 280 MG/DL (ref 70–110)
POCT GLUCOSE: 93 MG/DL (ref 70–110)
POTASSIUM SERPL-SCNC: 3.9 MMOL/L
RBC # BLD AUTO: 2.92 M/UL
SODIUM SERPL-SCNC: 139 MMOL/L
WBC # BLD AUTO: 7.59 K/UL

## 2019-03-14 PROCEDURE — 84100 ASSAY OF PHOSPHORUS: CPT

## 2019-03-14 PROCEDURE — 85025 COMPLETE CBC W/AUTO DIFF WBC: CPT

## 2019-03-14 PROCEDURE — 99233 PR SUBSEQUENT HOSPITAL CARE,LEVL III: ICD-10-PCS | Mod: ,,, | Performed by: HOSPITALIST

## 2019-03-14 PROCEDURE — 83735 ASSAY OF MAGNESIUM: CPT

## 2019-03-14 PROCEDURE — 63600175 PHARM REV CODE 636 W HCPCS: Performed by: HOSPITALIST

## 2019-03-14 PROCEDURE — 25000003 PHARM REV CODE 250: Performed by: INTERNAL MEDICINE

## 2019-03-14 PROCEDURE — 99233 SBSQ HOSP IP/OBS HIGH 50: CPT | Mod: ,,, | Performed by: NURSE PRACTITIONER

## 2019-03-14 PROCEDURE — 80048 BASIC METABOLIC PNL TOTAL CA: CPT

## 2019-03-14 PROCEDURE — 11000001 HC ACUTE MED/SURG PRIVATE ROOM

## 2019-03-14 PROCEDURE — 25000003 PHARM REV CODE 250: Performed by: HOSPITALIST

## 2019-03-14 PROCEDURE — 87086 URINE CULTURE/COLONY COUNT: CPT

## 2019-03-14 PROCEDURE — 36415 COLL VENOUS BLD VENIPUNCTURE: CPT

## 2019-03-14 PROCEDURE — 99233 SBSQ HOSP IP/OBS HIGH 50: CPT | Mod: ,,, | Performed by: HOSPITALIST

## 2019-03-14 PROCEDURE — 94761 N-INVAS EAR/PLS OXIMETRY MLT: CPT

## 2019-03-14 PROCEDURE — 99233 PR SUBSEQUENT HOSPITAL CARE,LEVL III: ICD-10-PCS | Mod: ,,, | Performed by: NURSE PRACTITIONER

## 2019-03-14 PROCEDURE — 25000003 PHARM REV CODE 250: Performed by: NURSE PRACTITIONER

## 2019-03-14 RX ORDER — CIPROFLOXACIN 500 MG/1
500 TABLET ORAL 2 TIMES DAILY
Qty: 4 TABLET | Refills: 0 | Status: SHIPPED | OUTPATIENT
Start: 2019-03-14 | End: 2019-03-21 | Stop reason: HOSPADM

## 2019-03-14 RX ORDER — BICALUTAMIDE 50 MG/1
50 TABLET, FILM COATED ORAL DAILY
Status: DISCONTINUED | OUTPATIENT
Start: 2019-03-14 | End: 2019-03-21 | Stop reason: HOSPADM

## 2019-03-14 RX ORDER — AMOXICILLIN 250 MG
1 CAPSULE ORAL DAILY
Status: DISCONTINUED | OUTPATIENT
Start: 2019-03-14 | End: 2019-03-21 | Stop reason: HOSPADM

## 2019-03-14 RX ORDER — POLYETHYLENE GLYCOL 3350 17 G/17G
17 POWDER, FOR SOLUTION ORAL 2 TIMES DAILY PRN
Status: DISCONTINUED | OUTPATIENT
Start: 2019-03-14 | End: 2019-03-21 | Stop reason: HOSPADM

## 2019-03-14 RX ORDER — BICALUTAMIDE 50 MG/1
50 TABLET, FILM COATED ORAL DAILY
Qty: 30 TABLET | Refills: 11 | Status: SHIPPED | OUTPATIENT
Start: 2019-03-14 | End: 2020-03-13

## 2019-03-14 RX ORDER — MAGNESIUM SULFATE HEPTAHYDRATE 40 MG/ML
2 INJECTION, SOLUTION INTRAVENOUS ONCE
Status: COMPLETED | OUTPATIENT
Start: 2019-03-14 | End: 2019-03-14

## 2019-03-14 RX ADMIN — BICALUTAMIDE 50 MG: 50 TABLET, FILM COATED ORAL at 11:03

## 2019-03-14 RX ADMIN — POLYETHYLENE GLYCOL 3350 17 G: 17 POWDER, FOR SOLUTION ORAL at 09:03

## 2019-03-14 RX ADMIN — ATENOLOL 50 MG: 25 TABLET ORAL at 08:03

## 2019-03-14 RX ADMIN — POLYETHYLENE GLYCOL 3350 17 G: 17 POWDER, FOR SOLUTION ORAL at 10:03

## 2019-03-14 RX ADMIN — SODIUM CHLORIDE, SODIUM LACTATE, POTASSIUM CHLORIDE, AND CALCIUM CHLORIDE 150 ML/HR: .6; .31; .03; .02 INJECTION, SOLUTION INTRAVENOUS at 04:03

## 2019-03-14 RX ADMIN — HYDROCODONE BITARTRATE AND ACETAMINOPHEN 1 TABLET: 5; 325 TABLET ORAL at 08:03

## 2019-03-14 RX ADMIN — SODIUM CHLORIDE, SODIUM LACTATE, POTASSIUM CHLORIDE, AND CALCIUM CHLORIDE: .6; .31; .03; .02 INJECTION, SOLUTION INTRAVENOUS at 04:03

## 2019-03-14 RX ADMIN — INSULIN ASPART 1 UNITS: 100 INJECTION, SOLUTION INTRAVENOUS; SUBCUTANEOUS at 10:03

## 2019-03-14 RX ADMIN — AMLODIPINE BESYLATE 5 MG: 5 TABLET ORAL at 08:03

## 2019-03-14 RX ADMIN — MAGNESIUM SULFATE IN WATER 2 G: 40 INJECTION, SOLUTION INTRAVENOUS at 09:03

## 2019-03-14 RX ADMIN — ATORVASTATIN CALCIUM 20 MG: 20 TABLET, FILM COATED ORAL at 08:03

## 2019-03-14 RX ADMIN — STANDARDIZED SENNA CONCENTRATE AND DOCUSATE SODIUM 1 TABLET: 8.6; 5 TABLET, FILM COATED ORAL at 10:03

## 2019-03-14 RX ADMIN — HYDROCODONE BITARTRATE AND ACETAMINOPHEN 1 TABLET: 5; 325 TABLET ORAL at 10:03

## 2019-03-14 NOTE — PLAN OF CARE
Pt states he has no needs at home.    Family to drive pt home.    CM to continue to follow.       03/14/19 8611   Discharge Reassessment   Assessment Type Discharge Planning Reassessment   Provided patient/caregiver education on the expected discharge date and the discharge plan Yes   Do you have any problems affording any of your prescribed medications? No   Discharge Plan A Home with family   DME Needed Upon Discharge  none   Patient choice form signed by patient/caregiver N/A   Anticipated Discharge Disposition Home

## 2019-03-14 NOTE — PLAN OF CARE
Problem: Adult Inpatient Plan of Care  Goal: Plan of Care Review  Outcome: Ongoing (interventions implemented as appropriate)  Pt remained free of falls and injuries throughout shift. IV flushed and infusing. Managed pain with PRN medications. Ambulates without assistance. Garcia intact and to gravity. VSS on room air. Bed low and locked, call light within reach, side rails up X2. Will continue to monitor.

## 2019-03-14 NOTE — PLAN OF CARE
Problem: Adult Inpatient Plan of Care  Goal: Plan of Care Review  Outcome: Ongoing (interventions implemented as appropriate)  No significant events this shift. Remains free from fall, injury, and skin breakdown. Garcia catheter in place & draining large amounts of bloody urine. VS stable on RA and afebrile. Positions self independently. No C/O pain.Tolerating ordered diet. IV site WNL. LR ringers is continuous @ 150 m/l hr. Plan of care reviewed with patient and all questions answered. Bed low, locked, Call light within reach. Purposeful rounding performed. No other complaints at this time

## 2019-03-14 NOTE — ASSESSMENT & PLAN NOTE
--Cysto with klaus stent placement yesterday with Dr. Cedeño. Appears to be related to advanced prostate cancer.   --Tolerating stents well    --Creatinine continues to improve, now 3.1   --Continues to diurese  --VT this am

## 2019-03-14 NOTE — TELEPHONE ENCOUNTER
Pt is now scheduled for TRUS/BX on 3/25 at 4:30. I will continue to view the schedule for a sooner appointment. Please advise.

## 2019-03-14 NOTE — PLAN OF CARE
Problem: Adult Inpatient Plan of Care  Goal: Plan of Care Review  Outcome: Ongoing (interventions implemented as appropriate)  No O2 in use.

## 2019-03-14 NOTE — SUBJECTIVE & OBJECTIVE
Interval History:   VSS, afebrile   Tolerating stents   Creatinine continues to improve, now 3.1   Continues to diurese  Hematuria improving     Review of Systems   Constitutional: Negative for chills and fever.   Respiratory: Negative for chest tightness and shortness of breath.    Cardiovascular: Negative for chest pain and palpitations.   Gastrointestinal: Negative for abdominal distention, abdominal pain, nausea and vomiting.   Genitourinary: Positive for hematuria. Negative for decreased urine volume, difficulty urinating (arenas) and flank pain.     Objective:     Temp:  [98 °F (36.7 °C)-98.7 °F (37.1 °C)] 98 °F (36.7 °C)  Pulse:  [58-66] 66  Resp:  [17-22] 22  SpO2:  [93 %-98 %] 98 %  BP: (147-185)/(67-85) 185/85     Body mass index is 25.72 kg/m².       Bladder Scan Volume (mL): 20 mL (03/11/19 1141)    Drains     Drain                 Urethral Catheter 03/11/19 1140 Coude 16 Fr. 2 days                Physical Exam   Constitutional: He is oriented to person, place, and time. He appears well-developed and well-nourished.   HENT:   Head: Normocephalic and atraumatic.   Cardiovascular: Normal rate, regular rhythm and normal heart sounds.    Pulmonary/Chest: Effort normal and breath sounds normal.   Abdominal: Soft. Bowel sounds are normal. He exhibits no distension. There is no tenderness. There is no CVA tenderness.   Genitourinary:   Genitourinary Comments: Arenas to gravity with thin, dark pink urine (no clots)    Neurological: He is alert and oriented to person, place, and time.   Skin: Skin is warm and dry.     Psychiatric: He has a normal mood and affect. His behavior is normal.       Significant Labs:    BMP:  Recent Labs   Lab 03/12/19  0657 03/13/19  0753 03/14/19  0422    140 139   K 3.5 3.9 3.9    100 103   CO2 24 29 29   BUN 59* 43* 36*   CREATININE 6.5* 4.4* 3.1*   CALCIUM 8.1* 8.6* 8.5*       CBC:   Recent Labs   Lab 03/12/19  0657 03/13/19  0753 03/14/19  0730   WBC 5.52 7.95 7.59   HGB  9.0* 9.3* 9.1*   HCT 26.4* 27.3* 27.2*    243 221       CMP:   Recent Labs   Lab 03/12/19  0657 03/13/19  0753 03/14/19  0422   * 102 102    140 139   K 3.5 3.9 3.9    100 103   CO2 24 29 29   BUN 59* 43* 36*   CREATININE 6.5* 4.4* 3.1*   CALCIUM 8.1* 8.6* 8.5*   MG  --  1.7 1.5*     Urine Culture: No results for input(s): LABURIN in the last 168 hours.  Urine Studies:   Recent Labs   Lab 03/11/19  0906   COLORU Yellow  Yellow   APPEARANCEUA Clear  Clear   PHUR 6.0  6.0   SPECGRAV 1.020  1.020   PROTEINUA Trace*  Trace*   GLUCUA Negative  Negative   KETONESU Negative  Negative   BILIRUBINUA Negative  Negative   OCCULTUA Trace*  Trace*   NITRITE Negative  Negative   UROBILINOGEN Negative  Negative   LEUKOCYTESUR Negative  Negative       Significant Imaging:  All pertinent imaging results/findings from the past 24 hours have been reviewed.

## 2019-03-14 NOTE — ASSESSMENT & PLAN NOTE
--.2  --Discussed PSA results and post op findings   --Start casodex  --Follow up for outpatient prostate bx ASAP. Reviewed pre procedure instructions.   --Office will call to schedule.

## 2019-03-14 NOTE — PLAN OF CARE
Problem: Adult Inpatient Plan of Care  Goal: Plan of Care Review  Outcome: Ongoing (interventions implemented as appropriate)  Pt remained free of falls and injuries throughout shift. IV flushed and saline locked. Managed pain with PRN medications. Ambulates without assistance. VSS on room air. Garcia intact and to gravity. Bed low and locked, call light within reach, side rails up X2. Will continue to monitor.

## 2019-03-14 NOTE — PROGRESS NOTES
"[facility]  Urology  Progress Note    Patient Name: Elissa Mullins  MRN: [id number]  Admission Date: 3/11/2019  Hospital Length of Stay: 3 days  Code Status: Full Code   Attending Provider: Kalpana Bro MD   Primary Care Physician: Eddie [facility]    Subjective:     HPI:  Mr. Mullins is a 73 y.o. male who presented to the ED with right flank pain that began this morning. Creatinine on admit was noted to be severely elevated at 4.5 with stable electrolytes. UA with only trace protein and RBCs. CT scan revealed "worsening bilateral hydronephrosis severe on the left and moderate to severe on the right with hydroureter to the level of the bladder suggesting an acute on chronic component.  There is enlargement of the prostate with a nodular contour superiorly which impresses on the bladder base." Upon chart review, it appears the patient presented with similar symptoms in December 2018; however did not follow up as instructed. At the time he had moderate-severe bilateral hydro and a creatinine level of 1.5. Urology was consulted for evaluation.      He reports an aching right flank pain with radiation to the abdomen that began this morning. He also reports the "feeling of incomplete bladder emptying" and urinary frequency that began months ago. Garcia was placed on admit with only 20 mL of urine output. Denies a slow stream, dysuria, hematuria, and fever/chills. + nausea that began last night; however denies vomiting. Denies a history of recurrent UTIs and nephrolithiasis. Denies a personal/family history of prostate cancer. Denies constipation, last BM yesterday.       Interval History:   VSS, afebrile   Tolerating stents   Creatinine continues to improve, now 3.1   Continues to diurese  Hematuria improving     Review of Systems   Constitutional: Negative for chills and fever.   Respiratory: Negative for chest tightness and shortness of breath.    Cardiovascular: Negative for " chest pain and palpitations.   Gastrointestinal: Negative for abdominal distention, abdominal pain, nausea and vomiting.   Genitourinary: Positive for hematuria. Negative for decreased urine volume, difficulty urinating (arenas) and flank pain.     Objective:     Temp:  [98 °F (36.7 °C)-98.7 °F (37.1 °C)] 98 °F (36.7 °C)  Pulse:  [58-66] 66  Resp:  [17-22] 22  SpO2:  [93 %-98 %] 98 %  BP: (147-185)/(67-85) 185/85     Body mass index is 25.72 kg/m².       Bladder Scan Volume (mL): 20 mL (03/11/19 1141)    Drains     Drain                 Urethral Catheter 03/11/19 1140 Coude 16 Fr. 2 days                Physical Exam   Constitutional: He is oriented to person, place, and time. He appears well-developed and well-nourished.   HENT:   Head: Normocephalic and atraumatic.   Cardiovascular: Normal rate, regular rhythm and normal heart sounds.    Pulmonary/Chest: Effort normal and breath sounds normal.   Abdominal: Soft. Bowel sounds are normal. He exhibits no distension. There is no tenderness. There is no CVA tenderness.   Genitourinary:   Genitourinary Comments: Arenas to gravity with thin, dark pink urine (no clots)    Neurological: He is alert and oriented to person, place, and time.   Skin: Skin is warm and dry.     Psychiatric: He has a normal mood and affect. His behavior is normal.       Significant Labs:    BMP:  Recent Labs   Lab 03/12/19 0657 03/13/19 0753 03/14/19  0422    140 139   K 3.5 3.9 3.9    100 103   CO2 24 29 29   BUN 59* 43* 36*   CREATININE 6.5* 4.4* 3.1*   CALCIUM 8.1* 8.6* 8.5*       CBC:   Recent Labs   Lab 03/12/19 0657 03/13/19 0753 03/14/19  0730   WBC 5.52 7.95 7.59   HGB 9.0* 9.3* 9.1*   HCT 26.4* 27.3* 27.2*    243 221       CMP:   Recent Labs   Lab 03/12/19  0657 03/13/19 0753 03/14/19  0422   * 102 102    140 139   K 3.5 3.9 3.9    100 103   CO2 24 29 29   BUN 59* 43* 36*   CREATININE 6.5* 4.4* 3.1*   CALCIUM 8.1* 8.6* 8.5*   MG  --  1.7 1.5*      Urine Culture: No results for input(s): LABURIN in the last 168 hours.  Urine Studies:   Recent Labs   Lab 03/11/19  0906   COLORU Yellow  Yellow   APPEARANCEUA Clear  Clear   PHUR 6.0  6.0   SPECGRAV 1.020  1.020   PROTEINUA Trace*  Trace*   GLUCUA Negative  Negative   KETONESU Negative  Negative   BILIRUBINUA Negative  Negative   OCCULTUA Trace*  Trace*   NITRITE Negative  Negative   UROBILINOGEN Negative  Negative   LEUKOCYTESUR Negative  Negative       Significant Imaging:  All pertinent imaging results/findings from the past 24 hours have been reviewed.                  Assessment/Plan:     * Bilateral hydronephrosis    --Cysto with klaus stent placement Tuesday with Dr. Cedeño. Appears to be related to advanced prostate cancer.   --Tolerating stents well    --Creatinine continues to improve, now 3.1   --Continues to diurese  --VT this am       Elevated PSA    --.2  --Discussed PSA results and post op findings   --Start casodex  --Follow up for outpatient prostate bx ASAP. Reviewed pre procedure instructions.    --Office will call to schedule.      ALEIDA (acute kidney injury)    --creatinine improving, now 3.1         VTE Risk Mitigation (From admission, onward)        Ordered     IP VTE HIGH RISK PATIENT  Once      03/11/19 1343     Place ROSEANNA hose  Until discontinued      03/11/19 1343     Place sequential compression device  Until discontinued      03/11/19 1343        I will sign off. Please call with questions or concerns.     Kerrie Prater, NP  Urology  [facility]

## 2019-03-14 NOTE — TELEPHONE ENCOUNTER
----- Message from Kerrie Prater NP sent at 3/14/2019 11:01 AM CDT -----  Please schedule TRUS/Bx ASAP. He is currently admitted. I reviewed instructions and gave him the instruction sheet in the hospital.   Thanks!

## 2019-03-15 PROBLEM — E83.42 HYPOMAGNESEMIA: Status: RESOLVED | Noted: 2019-03-14 | Resolved: 2019-03-15

## 2019-03-15 LAB
ANION GAP SERPL CALC-SCNC: 10 MMOL/L
BACTERIA #/AREA URNS HPF: ABNORMAL /HPF
BACTERIA UR CULT: NO GROWTH
BASOPHILS # BLD AUTO: 0.01 K/UL
BASOPHILS NFR BLD: 0.1 %
BILIRUB UR QL STRIP: NEGATIVE
BUN SERPL-MCNC: 31 MG/DL
CALCIUM SERPL-MCNC: 9.6 MG/DL
CHLORIDE SERPL-SCNC: 103 MMOL/L
CHLORIDE UR-SCNC: 89 MMOL/L
CLARITY UR: ABNORMAL
CO2 SERPL-SCNC: 28 MMOL/L
COLOR UR: YELLOW
CREAT SERPL-MCNC: 2.5 MG/DL
CREAT UR-MCNC: 44.5 MG/DL
DIFFERENTIAL METHOD: ABNORMAL
EOSINOPHIL # BLD AUTO: 0.2 K/UL
EOSINOPHIL NFR BLD: 2.3 %
EOSINOPHIL URNS QL WRIGHT STN: NORMAL
ERYTHROCYTE [DISTWIDTH] IN BLOOD BY AUTOMATED COUNT: 13.1 %
EST. GFR  (AFRICAN AMERICAN): 29 ML/MIN/1.73 M^2
EST. GFR  (NON AFRICAN AMERICAN): 25 ML/MIN/1.73 M^2
GLUCOSE SERPL-MCNC: 95 MG/DL
GLUCOSE UR QL STRIP: ABNORMAL
HCT VFR BLD AUTO: 29.6 %
HGB BLD-MCNC: 10 G/DL
HGB UR QL STRIP: ABNORMAL
HYALINE CASTS #/AREA URNS LPF: 0 /LPF
KETONES UR QL STRIP: NEGATIVE
LEUKOCYTE ESTERASE UR QL STRIP: ABNORMAL
LYMPHOCYTES # BLD AUTO: 2 K/UL
LYMPHOCYTES NFR BLD: 29.5 %
MAGNESIUM SERPL-MCNC: 2.2 MG/DL
MCH RBC QN AUTO: 31.6 PG
MCHC RBC AUTO-ENTMCNC: 33.8 G/DL
MCV RBC AUTO: 94 FL
MICROSCOPIC COMMENT: ABNORMAL
MONOCYTES # BLD AUTO: 0.8 K/UL
MONOCYTES NFR BLD: 11.3 %
NEUTROPHILS # BLD AUTO: 3.9 K/UL
NEUTROPHILS NFR BLD: 56.5 %
NITRITE UR QL STRIP: NEGATIVE
OSMOLALITY UR: 371 MOSM/KG
PH UR STRIP: 7 [PH] (ref 5–8)
PHOSPHATE SERPL-MCNC: 3.6 MG/DL
PLATELET # BLD AUTO: 251 K/UL
PMV BLD AUTO: 10.9 FL
POCT GLUCOSE: 120 MG/DL (ref 70–110)
POCT GLUCOSE: 208 MG/DL (ref 70–110)
POCT GLUCOSE: 299 MG/DL (ref 70–110)
POCT GLUCOSE: 99 MG/DL (ref 70–110)
POTASSIUM SERPL-SCNC: 4.5 MMOL/L
PROT UR QL STRIP: ABNORMAL
RBC # BLD AUTO: 3.16 M/UL
RBC #/AREA URNS HPF: 80 /HPF (ref 0–4)
SODIUM SERPL-SCNC: 141 MMOL/L
SODIUM UR-SCNC: 121 MMOL/L
SP GR UR STRIP: 1.01 (ref 1–1.03)
URN SPEC COLLECT METH UR: ABNORMAL
UROBILINOGEN UR STRIP-ACNC: NEGATIVE EU/DL
WBC # BLD AUTO: 6.84 K/UL
WBC #/AREA URNS HPF: 3 /HPF (ref 0–5)

## 2019-03-15 PROCEDURE — 81000 URINALYSIS NONAUTO W/SCOPE: CPT

## 2019-03-15 PROCEDURE — 84100 ASSAY OF PHOSPHORUS: CPT

## 2019-03-15 PROCEDURE — 25000003 PHARM REV CODE 250: Performed by: INTERNAL MEDICINE

## 2019-03-15 PROCEDURE — 99233 PR SUBSEQUENT HOSPITAL CARE,LEVL III: ICD-10-PCS | Mod: ,,, | Performed by: HOSPITALIST

## 2019-03-15 PROCEDURE — 99233 SBSQ HOSP IP/OBS HIGH 50: CPT | Mod: ,,, | Performed by: HOSPITALIST

## 2019-03-15 PROCEDURE — 84300 ASSAY OF URINE SODIUM: CPT

## 2019-03-15 PROCEDURE — 36415 COLL VENOUS BLD VENIPUNCTURE: CPT

## 2019-03-15 PROCEDURE — 83735 ASSAY OF MAGNESIUM: CPT

## 2019-03-15 PROCEDURE — 94761 N-INVAS EAR/PLS OXIMETRY MLT: CPT

## 2019-03-15 PROCEDURE — 99233 SBSQ HOSP IP/OBS HIGH 50: CPT | Mod: ,,, | Performed by: NURSE PRACTITIONER

## 2019-03-15 PROCEDURE — 82570 ASSAY OF URINE CREATININE: CPT

## 2019-03-15 PROCEDURE — 82436 ASSAY OF URINE CHLORIDE: CPT

## 2019-03-15 PROCEDURE — 85025 COMPLETE CBC W/AUTO DIFF WBC: CPT

## 2019-03-15 PROCEDURE — 25000003 PHARM REV CODE 250: Performed by: NURSE PRACTITIONER

## 2019-03-15 PROCEDURE — 83935 ASSAY OF URINE OSMOLALITY: CPT

## 2019-03-15 PROCEDURE — 99233 PR SUBSEQUENT HOSPITAL CARE,LEVL III: ICD-10-PCS | Mod: ,,, | Performed by: NURSE PRACTITIONER

## 2019-03-15 PROCEDURE — 63600175 PHARM REV CODE 636 W HCPCS: Performed by: INTERNAL MEDICINE

## 2019-03-15 PROCEDURE — 87205 SMEAR GRAM STAIN: CPT

## 2019-03-15 PROCEDURE — 80048 BASIC METABOLIC PNL TOTAL CA: CPT

## 2019-03-15 PROCEDURE — 25000003 PHARM REV CODE 250: Performed by: HOSPITALIST

## 2019-03-15 PROCEDURE — 11000001 HC ACUTE MED/SURG PRIVATE ROOM

## 2019-03-15 RX ADMIN — INSULIN ASPART 1 UNITS: 100 INJECTION, SOLUTION INTRAVENOUS; SUBCUTANEOUS at 09:03

## 2019-03-15 RX ADMIN — ATORVASTATIN CALCIUM 20 MG: 20 TABLET, FILM COATED ORAL at 09:03

## 2019-03-15 RX ADMIN — SODIUM CHLORIDE, SODIUM LACTATE, POTASSIUM CHLORIDE, AND CALCIUM CHLORIDE: .6; .31; .03; .02 INJECTION, SOLUTION INTRAVENOUS at 11:03

## 2019-03-15 RX ADMIN — SODIUM CHLORIDE, SODIUM LACTATE, POTASSIUM CHLORIDE, AND CALCIUM CHLORIDE: .6; .31; .03; .02 INJECTION, SOLUTION INTRAVENOUS at 02:03

## 2019-03-15 RX ADMIN — STANDARDIZED SENNA CONCENTRATE AND DOCUSATE SODIUM 1 TABLET: 8.6; 5 TABLET, FILM COATED ORAL at 09:03

## 2019-03-15 RX ADMIN — HYDRALAZINE HYDROCHLORIDE 10 MG: 20 INJECTION INTRAMUSCULAR; INTRAVENOUS at 01:03

## 2019-03-15 RX ADMIN — HYDROCODONE BITARTRATE AND ACETAMINOPHEN 1 TABLET: 5; 325 TABLET ORAL at 02:03

## 2019-03-15 RX ADMIN — BICALUTAMIDE 50 MG: 50 TABLET, FILM COATED ORAL at 09:03

## 2019-03-15 RX ADMIN — ATENOLOL 50 MG: 25 TABLET ORAL at 09:03

## 2019-03-15 RX ADMIN — AMLODIPINE BESYLATE 5 MG: 5 TABLET ORAL at 09:03

## 2019-03-15 NOTE — ASSESSMENT & PLAN NOTE
- Obstructive uropathy  - Continue IVF.  Expect continued improvement now that obstruction relieved.

## 2019-03-15 NOTE — SUBJECTIVE & OBJECTIVE
Interval History:  Had >5 liters out yesterday, and net balance is -8 liters.  Still on IV fluids.  For voiding trial today.    Review of Systems   Constitutional: Negative for chills and fever.   Respiratory: Negative for cough and shortness of breath.    Cardiovascular: Negative for chest pain and palpitations.     Objective:     Vital Signs (Most Recent):  Temp: 98.9 °F (37.2 °C) (03/14/19 1539)  Pulse: 61 (03/14/19 1539)  Resp: (!) 22 (03/14/19 0726)  BP: (!) 170/76 (03/14/19 1539)  SpO2: 95 % (03/14/19 1539) Vital Signs (24h Range):  Temp:  [98 °F (36.7 °C)-98.9 °F (37.2 °C)] 98.9 °F (37.2 °C)  Pulse:  [58-66] 61  Resp:  [17-22] 22  SpO2:  [94 %-98 %] 95 %  BP: (154-185)/(72-85) 170/76     Weight: 70.1 kg (154 lb 8.7 oz)  Body mass index is 25.72 kg/m².    Intake/Output Summary (Last 24 hours) at 3/14/2019 1902  Last data filed at 3/14/2019 1800  Gross per 24 hour   Intake 480 ml   Output 5225 ml   Net -4745 ml      Physical Exam   Constitutional: He is oriented to person, place, and time. He appears well-developed and well-nourished. No distress.   Pleasant.   HENT:   Head: Normocephalic.   Eyes: Conjunctivae are normal. Pupils are equal, round, and reactive to light.   Neck: Neck supple. No thyromegaly present.   Cardiovascular: Normal rate, regular rhythm, normal heart sounds and intact distal pulses. Exam reveals no gallop and no friction rub.   No murmur heard.  Pulmonary/Chest: Effort normal and breath sounds normal.   Abdominal: Soft. Bowel sounds are normal. He exhibits no distension. There is no tenderness.   Musculoskeletal: Normal range of motion. He exhibits no edema.   Lymphadenopathy:     He has no cervical adenopathy.   Neurological: He is alert and oriented to person, place, and time.   Strength equal and symmetric   Skin: Skin is warm and dry. No rash noted.   Psychiatric: He has a normal mood and affect. His behavior is normal. Thought content normal.       Significant Labs:   BMP:   Recent  Labs   Lab 03/14/19  0422         K 3.9      CO2 29   BUN 36*   CREATININE 3.1*   CALCIUM 8.5*   MG 1.5*       Significant Imaging: I have reviewed all pertinent imaging results/findings within the past 24 hours.

## 2019-03-15 NOTE — SUBJECTIVE & OBJECTIVE
Interval History:  Garcia removed and he is trying to void.  Nearly 6 liters out again yesterday, over 11 liters negative since admission.  Remarkably he feels well and electrolytes stable.    Review of Systems   Constitutional: Negative for chills and fever.   Respiratory: Negative for cough and shortness of breath.    Cardiovascular: Negative for chest pain and palpitations.     Objective:     Vital Signs (Most Recent):  Temp: 98.4 °F (36.9 °C) (03/15/19 1412)  Pulse: 75 (03/15/19 1412)  Resp: 20 (03/15/19 1412)  BP: (!) 175/79 (03/15/19 1412)  SpO2: 100 % (03/15/19 1412) Vital Signs (24h Range):  Temp:  [96.3 °F (35.7 °C)-98.7 °F (37.1 °C)] 98.4 °F (36.9 °C)  Pulse:  [54-75] 75  Resp:  [17-20] 20  SpO2:  [94 %-100 %] 100 %  BP: (162-197)/(71-92) 175/79     Weight: 70.1 kg (154 lb 8.7 oz)  Body mass index is 25.72 kg/m².    Intake/Output Summary (Last 24 hours) at 3/15/2019 1550  Last data filed at 3/15/2019 0751  Gross per 24 hour   Intake 1620 ml   Output 7440 ml   Net -5820 ml      Physical Exam   Constitutional: He is oriented to person, place, and time. He appears well-developed and well-nourished. No distress.   Pleasant.   HENT:   Head: Normocephalic.   Eyes: Conjunctivae are normal. Pupils are equal, round, and reactive to light.   Neck: Neck supple. No thyromegaly present.   Cardiovascular: Normal rate, regular rhythm, normal heart sounds and intact distal pulses. Exam reveals no gallop and no friction rub.   No murmur heard.  Pulmonary/Chest: Effort normal and breath sounds normal.   Abdominal: Soft. Bowel sounds are normal. He exhibits no distension. There is no tenderness.   Musculoskeletal: Normal range of motion. He exhibits no edema.   Lymphadenopathy:     He has no cervical adenopathy.   Neurological: He is alert and oriented to person, place, and time.   Strength equal and symmetric   Skin: Skin is warm and dry. No rash noted.   Psychiatric: He has a normal mood and affect. His behavior is normal.  Thought content normal.       Significant Labs:   BMP:   Recent Labs   Lab 03/15/19  0431   GLU 95      K 4.5      CO2 28   BUN 31*   CREATININE 2.5*   CALCIUM 9.6   MG 2.2     CBC:   Recent Labs   Lab 03/14/19  0730 03/15/19  0431   WBC 7.59 6.84   HGB 9.1* 10.0*   HCT 27.2* 29.6*    251       Significant Imaging: I have reviewed all pertinent imaging results/findings within the past 24 hours.

## 2019-03-15 NOTE — ASSESSMENT & PLAN NOTE
--.2  --Discussed PSA results and post op findings   --Continue casodex  --Appointment for TRUS/Bx with Dr. Cedeño on 3/25

## 2019-03-15 NOTE — PROGRESS NOTES
"[facility]  Urology  Progress Note    Patient Name: Elissa Mullins  MRN: [id number]  Admission Date: 3/11/2019  Hospital Length of Stay: 4 days  Code Status: Full Code   Attending Provider: Kalpana Bro MD   Primary Care Physician: Eddie [facility]    Subjective:     HPI:  Mr. Mullins is a 73 y.o. male who presented to the ED with right flank pain that began this morning. Creatinine on admit was noted to be severely elevated at 4.5 with stable electrolytes. UA with only trace protein and RBCs. CT scan revealed "worsening bilateral hydronephrosis severe on the left and moderate to severe on the right with hydroureter to the level of the bladder suggesting an acute on chronic component.  There is enlargement of the prostate with a nodular contour superiorly which impresses on the bladder base." Upon chart review, it appears the patient presented with similar symptoms in December 2018; however did not follow up as instructed. At the time he had moderate-severe bilateral hydro and a creatinine level of 1.5. Urology was consulted for evaluation.      He reports an aching right flank pain with radiation to the abdomen that began this morning. He also reports the "feeling of incomplete bladder emptying" and urinary frequency that began months ago. Garcia was placed on admit with only 20 mL of urine output. Denies a slow stream, dysuria, hematuria, and fever/chills. + nausea that began last night; however denies vomiting. Denies a history of recurrent UTIs and nephrolithiasis. Denies a personal/family history of prostate cancer. Denies constipation, last BM yesterday.       Interval History:   Doing well  Creatinine continues to improve, now 2.5  Garcia removed this am, has yet to void  Casodex started yesterday     Review of Systems   Constitutional: Negative for chills and fever.   Respiratory: Negative for chest tightness and shortness of breath.    Cardiovascular: Negative for " chest pain and palpitations.   Gastrointestinal: Negative for abdominal distention, abdominal pain, nausea and vomiting.   Genitourinary: Positive for hematuria. Negative for difficulty urinating, dysuria, flank pain, frequency and urgency.     Objective:     Temp:  [96.3 °F (35.7 °C)-98.9 °F (37.2 °C)] 96.3 °F (35.7 °C)  Pulse:  [54-66] 60  Resp:  [17-20] 20  SpO2:  [94 %-98 %] 98 %  BP: (159-184)/(71-78) 173/78     Body mass index is 25.72 kg/m².       Bladder Scan Volume (mL): 20 mL (03/11/19 1141)    Drains          None          Physical Exam   Constitutional: He is oriented to person, place, and time. He appears well-developed and well-nourished.   HENT:   Head: Normocephalic and atraumatic.   Cardiovascular: Normal rate, regular rhythm and normal heart sounds.    Pulmonary/Chest: Effort normal and breath sounds normal.   Abdominal: Soft. Bowel sounds are normal. He exhibits no distension. There is no tenderness. There is no CVA tenderness.   Musculoskeletal: Normal range of motion.   Neurological: He is alert and oriented to person, place, and time.   Skin: Skin is warm and dry.     Psychiatric: He has a normal mood and affect. His behavior is normal.       Significant Labs:    BMP:  Recent Labs   Lab 03/13/19  0753 03/14/19  0422 03/15/19  0431    139 141   K 3.9 3.9 4.5    103 103   CO2 29 29 28   BUN 43* 36* 31*   CREATININE 4.4* 3.1* 2.5*   CALCIUM 8.6* 8.5* 9.6       CBC:   Recent Labs   Lab 03/13/19  0753 03/14/19  0730 03/15/19  0431   WBC 7.95 7.59 6.84   HGB 9.3* 9.1* 10.0*   HCT 27.3* 27.2* 29.6*    221 251       Urine Culture: No results for input(s): LABURIN in the last 168 hours.  Urine Studies:   Recent Labs   Lab 03/11/19  0906   COLORU Yellow  Yellow   APPEARANCEUA Clear  Clear   PHUR 6.0  6.0   SPECGRAV 1.020  1.020   PROTEINUA Trace*  Trace*   GLUCUA Negative  Negative   KETONESU Negative  Negative   BILIRUBINUA Negative  Negative   OCCULTUA Trace*  Trace*    NITRITE Negative  Negative   UROBILINOGEN Negative  Negative   LEUKOCYTESUR Negative  Negative       Significant Imaging:  All pertinent imaging results/findings from the past 24 hours have been reviewed.                  Assessment/Plan:     * Bilateral hydronephrosis    --Cysto with klaus stent placement yesterday with Dr. Cedeño. Appears to be related to advanced prostate cancer.   --Tolerating stents well    --Creatinine continues to improve, now 2.5  --Continues to diurese  --VT this am       Elevated PSA    --.2  --Discussed PSA results and post op findings   --Continue casodex  --Appointment for TRUS/Bx with Dr. Cedeño on 3/25     ALEIDA (acute kidney injury)    --creatinine improving, now 2.5         VTE Risk Mitigation (From admission, onward)        Ordered     IP VTE HIGH RISK PATIENT  Once      03/11/19 1343     Place ROSEANNA hose  Until discontinued      03/11/19 1343     Place sequential compression device  Until discontinued      03/11/19 1343      I will sign off. Please call with questions or concerns.     Kerrie Prater NP  Urology  [facility]

## 2019-03-15 NOTE — PLAN OF CARE
Problem: Adult Inpatient Plan of Care  Goal: Patient-Specific Goal (Individualization)  Outcome: Ongoing (interventions implemented as appropriate)  Pt free of falls/trauma/injuries this shift.VSS. LR infusing, medication administered as perscribed. One time dose of Mg rider administered for a level of 1.5. Garcia in place draining bright red urine without difficulty. PRN Miralax for constipation, pt is passing gas. Patient tolerating POC well. Pt verbalizes understanding of care. Pt denies any chest pain, SOB, or any other discomforts at this time. Will continue to monitor.

## 2019-03-15 NOTE — PROGRESS NOTES
[facility] Hospital Medicine  Progress Note    Patient Name: Elissa Mullins  MRN: [id number]  Patient Class: IP- Inpatient   Admission Date: 3/11/2019  Length of Stay: 3 days  Attending Physician: Kalpana Bro MD  Primary Care Provider: [facility]        Subjective:     Principal Problem:Bilateral hydronephrosis    HPI:  Mr. Mullins is a 74 year old man who presented with pain in the right flank for one day associated with nausea and vomiting.  He has had urinary frequency but no dysuria, hematuria fever or chills.  His urinary stream has been weaker for the last 2 months.  He presented here with left flank pain in December last year, and a CT done at that time showed severe left-sided and moderate right-sided hydronephrosis with and a soft tissue density thought to be the enlarged prostate protruding  into the bladder lumen.  He had a Garcia placed in the ED with improvement in symptoms and was removed prior to discharge.  He was supposed to follow up with urology but did not.  Repeat CT done this presentation showed worsening bilateral hydronephrosis and enlargement of the prostate.  Labs showed ALEIDA with a creatinine of 4.5.  A Garcia was placed with only 20 mL urine produced.  He was admitted with urology consultation.    Hospital Course:  Patient was admitted with Garcia in placed for bladder outlet obstruction and urology was consulted.  Noted markedly elevated PSA and other findings consistent with advanced prostate cancer, although he has not had a biopsy yet.  Cystoscopy was planned with retrograde pyelogram and possible stent placement with Dr. Cedeño.  On 3/12 he underwent bilateral ureteroscopy, retrograde pyelogram and bilateral ureteral stent placement.  He was returned to the floor with Garcia in place and after relief of the obstruction had a considerable post-obstructive diuresis.  He remained on IV fluids while his fluid balance improved.  Voiding trial  done 3/14.              Interval History:  Had >5 liters out yesterday, and net balance is -8 liters.  Still on IV fluids.  For voiding trial today.    Review of Systems   Constitutional: Negative for chills and fever.   Respiratory: Negative for cough and shortness of breath.    Cardiovascular: Negative for chest pain and palpitations.     Objective:     Vital Signs (Most Recent):  Temp: 98.9 °F (37.2 °C) (03/14/19 1539)  Pulse: 61 (03/14/19 1539)  Resp: (!) 22 (03/14/19 0726)  BP: (!) 170/76 (03/14/19 1539)  SpO2: 95 % (03/14/19 1539) Vital Signs (24h Range):  Temp:  [98 °F (36.7 °C)-98.9 °F (37.2 °C)] 98.9 °F (37.2 °C)  Pulse:  [58-66] 61  Resp:  [17-22] 22  SpO2:  [94 %-98 %] 95 %  BP: (154-185)/(72-85) 170/76     Weight: 70.1 kg (154 lb 8.7 oz)  Body mass index is 25.72 kg/m².    Intake/Output Summary (Last 24 hours) at 3/14/2019 1902  Last data filed at 3/14/2019 1800  Gross per 24 hour   Intake 480 ml   Output 5225 ml   Net -4745 ml      Physical Exam   Constitutional: He is oriented to person, place, and time. He appears well-developed and well-nourished. No distress.   Pleasant.   HENT:   Head: Normocephalic.   Eyes: Conjunctivae are normal. Pupils are equal, round, and reactive to light.   Neck: Neck supple. No thyromegaly present.   Cardiovascular: Normal rate, regular rhythm, normal heart sounds and intact distal pulses. Exam reveals no gallop and no friction rub.   No murmur heard.  Pulmonary/Chest: Effort normal and breath sounds normal.   Abdominal: Soft. Bowel sounds are normal. He exhibits no distension. There is no tenderness.   Musculoskeletal: Normal range of motion. He exhibits no edema.   Lymphadenopathy:     He has no cervical adenopathy.   Neurological: He is alert and oriented to person, place, and time.   Strength equal and symmetric   Skin: Skin is warm and dry. No rash noted.   Psychiatric: He has a normal mood and affect. His behavior is normal. Thought content normal.       Significant  Labs:   BMP:   Recent Labs   Lab 03/14/19  0422         K 3.9      CO2 29   BUN 36*   CREATININE 3.1*   CALCIUM 8.5*   MG 1.5*       Significant Imaging: I have reviewed all pertinent imaging results/findings within the past 24 hours.    Assessment/Plan:      * Bilateral hydronephrosis    Worsening hydronephrosis from prior indicating bladder outlet obstruction  Elevated PSA consistent with prostate cancer.  Will need outpatient prostate biopsy.  Cystoscopy 3/12 with bilateral obstructions relieved with stenting.  Post-obstructive diuresis is still considerable.  Patient on IVF and has been encouraged to drink fluids.  Voiding trial today     Hypomagnesemia    - Replace 2 gm IV.       Elevated PSA           Postobstructive diuresis    - 8 liters negative since stents placed.  - Continue IV fluids.  - Encourage him to drink more.       Hyperlipidemia    Continue statin       Essential hypertension    - Continue amlodipine, atenolol  - Hold HCTZ, losartan with ALEIDA  - BP up with IVF.       Type 2 diabetes mellitus, without long-term current use of insulin    Metformin held.  Sliding scale insulin.       ALEIDA (acute kidney injury)    - Obstructive uropathy  - Continue IVF.  Expect continued improvement now that obstruction relieved.       VTE Risk Mitigation (From admission, onward)        Ordered     IP VTE HIGH RISK PATIENT  Once      03/11/19 1343     Place ROSEANNA hose  Until discontinued      03/11/19 1343     Place sequential compression device  Until discontinued      03/11/19 1343              Kalpana Khan MD  Department of Hospital Medicine   [facility]

## 2019-03-15 NOTE — PROGRESS NOTES
[facility] Hospital Medicine  Progress Note    Patient Name: Elissa Mullins  MRN: [id number]  Patient Class: IP- Inpatient   Admission Date: 3/11/2019  Length of Stay: 4 days  Attending Physician: Kalpana Bro MD  Primary Care Provider: [facility]        Subjective:     Principal Problem:Bilateral hydronephrosis    HPI:  Mr. Mullins is a 74 year old man who presented with pain in the right flank for one day associated with nausea and vomiting.  He has had urinary frequency but no dysuria, hematuria fever or chills.  His urinary stream has been weaker for the last 2 months.  He presented here with left flank pain in December last year, and a CT done at that time showed severe left-sided and moderate right-sided hydronephrosis with and a soft tissue density thought to be the enlarged prostate protruding  into the bladder lumen.  He had a Garcia placed in the ED with improvement in symptoms and was removed prior to discharge.  He was supposed to follow up with urology but did not.  Repeat CT done this presentation showed worsening bilateral hydronephrosis and enlargement of the prostate.  Labs showed ALEIDA with a creatinine of 4.5.  A Garcia was placed with only 20 mL urine produced.  He was admitted with urology consultation.    Hospital Course:  Patient was admitted with Garcia in placed for bladder outlet obstruction and urology was consulted.  Noted markedly elevated PSA and other findings consistent with advanced prostate cancer, although he has not had a biopsy yet.  Cystoscopy was planned with retrograde pyelogram and possible stent placement with Dr. Cedeño.  On 3/12 he underwent bilateral ureteroscopy, retrograde pyelogram and bilateral ureteral stent placement.  He was returned to the floor with Garcia in place and after relief of the obstruction had a considerable post-obstructive diuresis.  He remained on IV fluids while his fluid balance improved.  Voiding trial  done 3/14.              Interval History:  Garcia removed and he is trying to void.  Nearly 6 liters out again yesterday, over 11 liters negative since admission.  Remarkably he feels well and electrolytes stable.    Review of Systems   Constitutional: Negative for chills and fever.   Respiratory: Negative for cough and shortness of breath.    Cardiovascular: Negative for chest pain and palpitations.     Objective:     Vital Signs (Most Recent):  Temp: 98.4 °F (36.9 °C) (03/15/19 1412)  Pulse: 75 (03/15/19 1412)  Resp: 20 (03/15/19 1412)  BP: (!) 175/79 (03/15/19 1412)  SpO2: 100 % (03/15/19 1412) Vital Signs (24h Range):  Temp:  [96.3 °F (35.7 °C)-98.7 °F (37.1 °C)] 98.4 °F (36.9 °C)  Pulse:  [54-75] 75  Resp:  [17-20] 20  SpO2:  [94 %-100 %] 100 %  BP: (162-197)/(71-92) 175/79     Weight: 70.1 kg (154 lb 8.7 oz)  Body mass index is 25.72 kg/m².    Intake/Output Summary (Last 24 hours) at 3/15/2019 1550  Last data filed at 3/15/2019 0751  Gross per 24 hour   Intake 1620 ml   Output 7440 ml   Net -5820 ml      Physical Exam   Constitutional: He is oriented to person, place, and time. He appears well-developed and well-nourished. No distress.   Pleasant.   HENT:   Head: Normocephalic.   Eyes: Conjunctivae are normal. Pupils are equal, round, and reactive to light.   Neck: Neck supple. No thyromegaly present.   Cardiovascular: Normal rate, regular rhythm, normal heart sounds and intact distal pulses. Exam reveals no gallop and no friction rub.   No murmur heard.  Pulmonary/Chest: Effort normal and breath sounds normal.   Abdominal: Soft. Bowel sounds are normal. He exhibits no distension. There is no tenderness.   Musculoskeletal: Normal range of motion. He exhibits no edema.   Lymphadenopathy:     He has no cervical adenopathy.   Neurological: He is alert and oriented to person, place, and time.   Strength equal and symmetric   Skin: Skin is warm and dry. No rash noted.   Psychiatric: He has a normal mood and affect.  His behavior is normal. Thought content normal.       Significant Labs:   BMP:   Recent Labs   Lab 03/15/19  0431   GLU 95      K 4.5      CO2 28   BUN 31*   CREATININE 2.5*   CALCIUM 9.6   MG 2.2     CBC:   Recent Labs   Lab 03/14/19  0730 03/15/19  0431   WBC 7.59 6.84   HGB 9.1* 10.0*   HCT 27.2* 29.6*    251       Significant Imaging: I have reviewed all pertinent imaging results/findings within the past 24 hours.    Assessment/Plan:      * Bilateral hydronephrosis    Worsening hydronephrosis from prior indicating bladder outlet obstruction  Elevated PSA consistent with prostate cancer.  Will need outpatient prostate biopsy.  Cystoscopy 3/12 with bilateral obstructions relieved with stenting.  Post-obstructive diuresis is still considerable.  Patient on IVF and has been encouraged to drink fluids.  Nephrology evaluated, will decrease IVF as we might be pushing the diuresis with fluids.  In addition his HTN is worse on IVF.     Elevated PSA           Postobstructive diuresis    - As above, 11 liters negative since stents placed.  - Encourage him to drink more.       Hyperlipidemia    Continue statin       Essential hypertension    - Continue amlodipine, atenolol  - Hold HCTZ, losartan with ALEIDA  - BP up with IVF.       Type 2 diabetes mellitus, without long-term current use of insulin    Metformin held.  Sliding scale insulin.       ALEIDA (acute kidney injury)    - Obstructive uropathy  - Continue IVF.  Expect continued improvement now that obstruction relieved.       VTE Risk Mitigation (From admission, onward)        Ordered     IP VTE HIGH RISK PATIENT  Once      03/11/19 1343     Place ROSEANNA hose  Until discontinued      03/11/19 1343     Place sequential compression device  Until discontinued      03/11/19 1343              Kalpana Khan MD  Department of Hospital Medicine   [facility], [place]

## 2019-03-15 NOTE — ASSESSMENT & PLAN NOTE
Worsening hydronephrosis from prior indicating bladder outlet obstruction  Elevated PSA consistent with prostate cancer.  Will need outpatient prostate biopsy.  Cystoscopy 3/12 with bilateral obstructions relieved with stenting.  Post-obstructive diuresis is still considerable.  Patient on IVF and has been encouraged to drink fluids.  Voiding trial today

## 2019-03-15 NOTE — CONSULTS
Consult Note  Nephrology    Consult Requested By: Kalpana Bro MD  Reason for Consult: ALEIDA/polyuria    SUBJECTIVE:     History of Present Illness:  Patient is a 73 y.o. male presents with right flank pain and found to have severe urinary retention and bilateral hydronephrosis.  Had ALEIDA with Creat peak of 6.5 and has improved to 2.5 followed stents and arenas placement.  Over the past few days his UOP has been extremely brisk and now net negative 11+ liters. Being treated for prostate Ca.      Consulted for evaluation.  Discussed with Dr. Bro.  Pt seen and examined.  Arenas just removed this am and voided gross hematuria just now.  Labs noted.  Pt with adequate oral intake.  Labs stable despite mass uop.      Epic reviewed.  Currently denies any CP/SOB/N/V/D/F/C.      Past Medical History:   Diagnosis Date    Flank pain, chronic     Right sided, due to injury in 20's per pet    Hyperlipidemia     Hypertension      Past Surgical History:   Procedure Laterality Date    CYSTOURETEROSCOPY, WITH RETROGRADE PYELOGRAM AND URETERAL STENT INSERTION (ADD ON ) Bilateral 3/12/2019    Performed by Faraz Cedeño MD at [facility] OR     History reviewed. No pertinent family history.  Social History     Tobacco Use    Smoking status: Never Smoker    Smokeless tobacco: Never Used   Substance Use Topics    Alcohol use: No     Frequency: Never    Drug use: No       Review of patient's allergies indicates:  No Known Allergies     Review of Systems:  Constitutional: No fever or chills  Respiratory: No cough or shortness of breath  Cardiovascular: No chest pain or palpitations  Gastrointestinal: No nausea or vomiting  Neurological: No confusion or weakness    OBJECTIVE:     Vital Signs (Most Recent)  Temp: 96.3 °F (35.7 °C) (03/15/19 0751)  Pulse: 60 (03/15/19 0751)  Resp: 20 (03/15/19 0751)  BP: (!) 173/78 (03/15/19 0751)  SpO2: 98 % (03/15/19 0751)    Vital Signs Range (Last 24H):  Temp:  [96.3 °F (35.7 °C)-98.9 °F (37.2  °C)]   Pulse:  [54-66]   Resp:  [17-20]   BP: (159-184)/(71-78)   SpO2:  [94 %-98 %]       Intake/Output Summary (Last 24 hours) at 3/15/2019 1050  Last data filed at 3/15/2019 0600  Gross per 24 hour   Intake 1740 ml   Output 6840 ml   Net -5100 ml       Physical Exam:  General appearance: Well developed, well nourished  Eyes:  Conjunctivae/corneas clear. PERRL.  Lungs: Normal respiratory effort,   clear to auscultation bilaterally   Heart: Regular rate and rhythm, S1, S2 normal, no murmur, rub or charisse.  Abdomen: Soft, non-tender non-distended; bowel sounds normal; no masses,  no organomegaly  Extremities: No cyanosis or clubbing. No edema.    Skin: Skin color, texture, turgor normal. No rashes or lesions  Neurologic: Normal strength and tone. No focal numbness or weakness   Gross hematuria via urinal      Laboratory:  Recent Labs   Lab 03/15/19  0431   WBC 6.84   RBC 3.16*   HGB 10.0*   HCT 29.6*      MCV 94   MCH 31.6*   MCHC 33.8     BMP:   Recent Labs   Lab 03/15/19  0431   GLU 95      K 4.5      CO2 28   BUN 31*   CREATININE 2.5*   CALCIUM 9.6   MG 2.2     Lab Results   Component Value Date    CALCIUM 9.6 03/15/2019    PHOS 3.6 03/15/2019     BNP  No results for input(s): BNP, BNPTRIAGEBLO in the last 168 hours.No results found for: URICACIDNo results found for: IRON, TIBC, FERRITIN, SATURATEDIRO  Lab Results   Component Value Date    CALCIUM 9.6 03/15/2019    PHOS 3.6 03/15/2019       Diagnostic Results:  SURG FL Surgery Fluoro Usage   Final Result   See OP Notes for results.                   This procedure was auto-finalized by: Virtual Radiologist            CT Renal Stone Study ABD Pelvis WO   Final Result   Abnormal      Worsening bilateral hydronephrosis severe on the left and moderate to severe on the right with hydroureter to the level of the bladder suggesting an acute on chronic component.  There is enlargement of the prostate with a nodular contour superiorly which impresses on  the bladder base.  This is likely relates to the prostate rather than a true bladder mass although follow-up with urology for direct visualization is recommended.      This report was flagged in Epic as abnormal.         Electronically signed by: Tuan Pittman MD   Date:    03/11/2019   Time:    11:15          ASSESSMENT/PLAN:     1. Resolving ALEIDA on mild CKD III (baseline Creat 1.3) secondary to bilateral hydro/ROBLEDO/retention/Prostate Ca (N17.9, n18.3, N13.30, C61):  Creat markedly improved following release of hydro/ROBLEDO.  Continue to follow trends and hopefully will return to baseline soon.  Avoid NSAIDS/Bactrim.  Will follow trends.Renally dose meds, avoid nephrotoxins, and monitor I/O's closely.  2. Postobstructive diuresis (R35.8):  Will check urine lytes and cut down rate of IVF's to see if we're pushing diuresis.  No signs of DI at this time.  Garcia just removed so will have to monitor bladder scans.  Encouraged pt to drink liquids and must get accurate I/O's.  Lytes stable on LR infusion.  See orders.   3. HTN (I12.9):  BB/CCB if needed.  Hydralazine PRN. No ACE/ARB at this time.       Thanks for consult  See orders.  Will follow along.

## 2019-03-15 NOTE — ASSESSMENT & PLAN NOTE
Worsening hydronephrosis from prior indicating bladder outlet obstruction  Elevated PSA consistent with prostate cancer.  Will need outpatient prostate biopsy.  Cystoscopy 3/12 with bilateral obstructions relieved with stenting.  Post-obstructive diuresis is still considerable.  Patient on IVF and has been encouraged to drink fluids.  Nephrology evaluated, will decrease IVF as we might be pushing the diuresis with fluids.  In addition his HTN is worse on IVF.

## 2019-03-15 NOTE — ASSESSMENT & PLAN NOTE
--Cysto with klaus stent placement yesterday with Dr. Cedeño. Appears to be related to advanced prostate cancer.   --Tolerating stents well    --Creatinine continues to improve, now 2.5  --Continues to diurese  --VT this am

## 2019-03-16 LAB
ANION GAP SERPL CALC-SCNC: 10 MMOL/L
BUN SERPL-MCNC: 28 MG/DL
CALCIUM SERPL-MCNC: 9.4 MG/DL
CHLORIDE SERPL-SCNC: 104 MMOL/L
CO2 SERPL-SCNC: 24 MMOL/L
CREAT SERPL-MCNC: 2.4 MG/DL
EST. GFR  (AFRICAN AMERICAN): 30 ML/MIN/1.73 M^2
EST. GFR  (NON AFRICAN AMERICAN): 26 ML/MIN/1.73 M^2
GLUCOSE SERPL-MCNC: 107 MG/DL
MAGNESIUM SERPL-MCNC: 1.7 MG/DL
PHOSPHATE SERPL-MCNC: 3.5 MG/DL
POCT GLUCOSE: 105 MG/DL (ref 70–110)
POCT GLUCOSE: 149 MG/DL (ref 70–110)
POCT GLUCOSE: 183 MG/DL (ref 70–110)
POTASSIUM SERPL-SCNC: 4 MMOL/L
SODIUM SERPL-SCNC: 138 MMOL/L

## 2019-03-16 PROCEDURE — 36415 COLL VENOUS BLD VENIPUNCTURE: CPT

## 2019-03-16 PROCEDURE — 99900035 HC TECH TIME PER 15 MIN (STAT)

## 2019-03-16 PROCEDURE — 25000003 PHARM REV CODE 250: Performed by: NURSE PRACTITIONER

## 2019-03-16 PROCEDURE — 25000003 PHARM REV CODE 250: Performed by: INTERNAL MEDICINE

## 2019-03-16 PROCEDURE — 25000003 PHARM REV CODE 250: Performed by: HOSPITALIST

## 2019-03-16 PROCEDURE — 11000001 HC ACUTE MED/SURG PRIVATE ROOM

## 2019-03-16 PROCEDURE — 94761 N-INVAS EAR/PLS OXIMETRY MLT: CPT

## 2019-03-16 PROCEDURE — 84100 ASSAY OF PHOSPHORUS: CPT

## 2019-03-16 PROCEDURE — 80048 BASIC METABOLIC PNL TOTAL CA: CPT

## 2019-03-16 PROCEDURE — 83735 ASSAY OF MAGNESIUM: CPT

## 2019-03-16 PROCEDURE — 99232 SBSQ HOSP IP/OBS MODERATE 35: CPT | Mod: ,,, | Performed by: HOSPITALIST

## 2019-03-16 PROCEDURE — 99232 PR SUBSEQUENT HOSPITAL CARE,LEVL II: ICD-10-PCS | Mod: ,,, | Performed by: HOSPITALIST

## 2019-03-16 RX ORDER — HYDRALAZINE HYDROCHLORIDE 25 MG/1
25 TABLET, FILM COATED ORAL EVERY 8 HOURS PRN
Status: DISCONTINUED | OUTPATIENT
Start: 2019-03-16 | End: 2019-03-21 | Stop reason: HOSPADM

## 2019-03-16 RX ORDER — HYDRALAZINE HYDROCHLORIDE 25 MG/1
25 TABLET, FILM COATED ORAL EVERY 8 HOURS
Status: DISCONTINUED | OUTPATIENT
Start: 2019-03-16 | End: 2019-03-16

## 2019-03-16 RX ORDER — ACETAMINOPHEN 325 MG/1
650 TABLET ORAL EVERY 6 HOURS PRN
Status: DISCONTINUED | OUTPATIENT
Start: 2019-03-16 | End: 2019-03-21 | Stop reason: HOSPADM

## 2019-03-16 RX ADMIN — HYDRALAZINE HYDROCHLORIDE 25 MG: 25 TABLET, FILM COATED ORAL at 01:03

## 2019-03-16 RX ADMIN — ACETAMINOPHEN 650 MG: 325 TABLET, FILM COATED ORAL at 11:03

## 2019-03-16 RX ADMIN — SODIUM CHLORIDE, SODIUM LACTATE, POTASSIUM CHLORIDE, AND CALCIUM CHLORIDE: .6; .31; .03; .02 INJECTION, SOLUTION INTRAVENOUS at 01:03

## 2019-03-16 RX ADMIN — ATORVASTATIN CALCIUM 20 MG: 20 TABLET, FILM COATED ORAL at 09:03

## 2019-03-16 RX ADMIN — HYDROCODONE BITARTRATE AND ACETAMINOPHEN 1 TABLET: 5; 325 TABLET ORAL at 09:03

## 2019-03-16 RX ADMIN — AMLODIPINE BESYLATE 5 MG: 5 TABLET ORAL at 09:03

## 2019-03-16 RX ADMIN — HYDRALAZINE HYDROCHLORIDE 25 MG: 25 TABLET, FILM COATED ORAL at 11:03

## 2019-03-16 RX ADMIN — BICALUTAMIDE 50 MG: 50 TABLET, FILM COATED ORAL at 09:03

## 2019-03-16 RX ADMIN — ATENOLOL 50 MG: 25 TABLET ORAL at 09:03

## 2019-03-16 RX ADMIN — STANDARDIZED SENNA CONCENTRATE AND DOCUSATE SODIUM 1 TABLET: 8.6; 5 TABLET, FILM COATED ORAL at 09:03

## 2019-03-16 RX ADMIN — ACETAMINOPHEN 650 MG: 325 TABLET, FILM COATED ORAL at 01:03

## 2019-03-16 NOTE — PROGRESS NOTES
[facility] Hospital Medicine  Progress Note    Patient Name: Elissa Mullins  MRN: [id number]  Patient Class: IP- Inpatient   Admission Date: 3/11/2019  Length of Stay: 5 days  Attending Physician: Kalpana Bro MD  Primary Care Provider: [facility]        Subjective:     Principal Problem:Bilateral hydronephrosis    HPI:  Mr. Mullins is a 74 year old man who presented with pain in the right flank for one day associated with nausea and vomiting.  He has had urinary frequency but no dysuria, hematuria fever or chills.  His urinary stream has been weaker for the last 2 months.  He presented here with left flank pain in December last year, and a CT done at that time showed severe left-sided and moderate right-sided hydronephrosis with and a soft tissue density thought to be the enlarged prostate protruding  into the bladder lumen.  He had a Garcia placed in the ED with improvement in symptoms and was removed prior to discharge.  He was supposed to follow up with urology but did not.  Repeat CT done this presentation showed worsening bilateral hydronephrosis and enlargement of the prostate.  Labs showed ALEIDA with a creatinine of 4.5.  A Garcia was placed with only 20 mL urine produced.  He was admitted with urology consultation.    Hospital Course:  Patient was admitted with Garcia in placed for bladder outlet obstruction and urology was consulted.  Noted markedly elevated PSA and other findings consistent with advanced prostate cancer, although he has not had a biopsy yet.  Cystoscopy was planned with retrograde pyelogram and possible stent placement with Dr. Cedeño.  On 3/12 he underwent bilateral ureteroscopy, retrograde pyelogram and bilateral ureteral stent placement.  He was returned to the floor with Garcia in place and after relief of the obstruction had a considerable post-obstructive diuresis.  He remained on IV fluids while his fluid balance improved.  Voiding trial  done 3/14.  He continued to diurese until fluid balance was -11 liters.  At that point nephrology was consulted for assistance with fluids.  IV fluids were decreased and the diuresis started to resolve.              Interval History:  No complaints.  Reports he has been throwing out most of his urine before it can be measured.  Only 1600 mL recorded yesterday.    Review of Systems   Constitutional: Negative for chills and fever.   Respiratory: Negative for cough and shortness of breath.    Cardiovascular: Negative for chest pain and palpitations.     Objective:     Vital Signs (Most Recent):  Temp: 98.9 °F (37.2 °C) (03/16/19 1237)  Pulse: 60 (03/16/19 1237)  Resp: 20 (03/16/19 1237)  BP: (!) 156/86 (03/16/19 1237)  SpO2: 99 % (03/16/19 1237) Vital Signs (24h Range):  Temp:  [97.4 °F (36.3 °C)-98.9 °F (37.2 °C)] 98.9 °F (37.2 °C)  Pulse:  [57-72] 60  Resp:  [18-20] 20  SpO2:  [97 %-100 %] 99 %  BP: (156-191)/(72-86) 156/86     Weight: 70.1 kg (154 lb 8.7 oz)  Body mass index is 25.72 kg/m².    Intake/Output Summary (Last 24 hours) at 3/16/2019 1428  Last data filed at 3/15/2019 2000  Gross per 24 hour   Intake 250 ml   Output 825 ml   Net -575 ml      Physical Exam   Constitutional: He is oriented to person, place, and time. He appears well-developed and well-nourished. No distress.   Pleasant.   HENT:   Head: Normocephalic.   Eyes: Conjunctivae are normal. Pupils are equal, round, and reactive to light.   Neck: Neck supple. No thyromegaly present.   Cardiovascular: Normal rate, regular rhythm, normal heart sounds and intact distal pulses. Exam reveals no gallop and no friction rub.   No murmur heard.  Pulmonary/Chest: Effort normal and breath sounds normal.   Abdominal: Soft. Bowel sounds are normal. He exhibits no distension. There is no tenderness.   Musculoskeletal: Normal range of motion. He exhibits no edema.   Lymphadenopathy:     He has no cervical adenopathy.   Neurological: He is alert and oriented to  person, place, and time.   Strength equal and symmetric   Skin: Skin is warm and dry. No rash noted.   Psychiatric: He has a normal mood and affect. His behavior is normal. Thought content normal.       Significant Labs:   BMP:   Recent Labs   Lab 03/16/19  0415         K 4.0      CO2 24   BUN 28*   CREATININE 2.4*   CALCIUM 9.4   MG 1.7       Significant Imaging: I have reviewed all pertinent imaging results/findings within the past 24 hours.    Assessment/Plan:      * Bilateral hydronephrosis    Worsening hydronephrosis from prior indicating bladder outlet obstruction  Elevated PSA consistent with prostate cancer.  Will need outpatient prostate biopsy.  Cystoscopy 3/12 with bilateral obstructions relieved with stenting.  Post-obstructive diuresis is still considerable.  Patient on IVF and has been encouraged to drink fluids.  Fluids decreased yesterday.       Elevated PSA           Postobstructive diuresis    - As above, now 12 liters negative since stents placed, probably more as I/O not accurate.  - Encourage him to drink more.       Hyperlipidemia    Continue statin       Essential hypertension    - Continue amlodipine, atenolol  - Hold HCTZ, losartan with ALEIDA  - BP up with IVF, hopefully will start coming down with decrease in fluids.       Type 2 diabetes mellitus, without long-term current use of insulin    Metformin held.  Sliding scale insulin.       ALEIDA (acute kidney injury)    - Obstructive uropathy  - Continue IVF.  Expect continued improvement now that obstruction relieved.       VTE Risk Mitigation (From admission, onward)        Ordered     IP VTE HIGH RISK PATIENT  Once      03/11/19 1343     Place ROSEANNA hose  Until discontinued      03/11/19 1343     Place sequential compression device  Until discontinued      03/11/19 1343              Kalpana Khan MD  Department of Hospital Medicine   [facility]

## 2019-03-16 NOTE — SUBJECTIVE & OBJECTIVE
Interval History:  No complaints.  Reports he has been throwing out most of his urine before it can be measured.  Only 1600 mL recorded yesterday.    Review of Systems   Constitutional: Negative for chills and fever.   Respiratory: Negative for cough and shortness of breath.    Cardiovascular: Negative for chest pain and palpitations.     Objective:     Vital Signs (Most Recent):  Temp: 98.9 °F (37.2 °C) (03/16/19 1237)  Pulse: 60 (03/16/19 1237)  Resp: 20 (03/16/19 1237)  BP: (!) 156/86 (03/16/19 1237)  SpO2: 99 % (03/16/19 1237) Vital Signs (24h Range):  Temp:  [97.4 °F (36.3 °C)-98.9 °F (37.2 °C)] 98.9 °F (37.2 °C)  Pulse:  [57-72] 60  Resp:  [18-20] 20  SpO2:  [97 %-100 %] 99 %  BP: (156-191)/(72-86) 156/86     Weight: 70.1 kg (154 lb 8.7 oz)  Body mass index is 25.72 kg/m².    Intake/Output Summary (Last 24 hours) at 3/16/2019 1428  Last data filed at 3/15/2019 2000  Gross per 24 hour   Intake 250 ml   Output 825 ml   Net -575 ml      Physical Exam   Constitutional: He is oriented to person, place, and time. He appears well-developed and well-nourished. No distress.   Pleasant.   HENT:   Head: Normocephalic.   Eyes: Conjunctivae are normal. Pupils are equal, round, and reactive to light.   Neck: Neck supple. No thyromegaly present.   Cardiovascular: Normal rate, regular rhythm, normal heart sounds and intact distal pulses. Exam reveals no gallop and no friction rub.   No murmur heard.  Pulmonary/Chest: Effort normal and breath sounds normal.   Abdominal: Soft. Bowel sounds are normal. He exhibits no distension. There is no tenderness.   Musculoskeletal: Normal range of motion. He exhibits no edema.   Lymphadenopathy:     He has no cervical adenopathy.   Neurological: He is alert and oriented to person, place, and time.   Strength equal and symmetric   Skin: Skin is warm and dry. No rash noted.   Psychiatric: He has a normal mood and affect. His behavior is normal. Thought content normal.       Significant Labs:    BMP:   Recent Labs   Lab 03/16/19  0415         K 4.0      CO2 24   BUN 28*   CREATININE 2.4*   CALCIUM 9.4   MG 1.7       Significant Imaging: I have reviewed all pertinent imaging results/findings within the past 24 hours.

## 2019-03-16 NOTE — ASSESSMENT & PLAN NOTE
- Continue amlodipine, atenolol  - Hold HCTZ, losartan with ALEIDA  - BP up with IVF, hopefully will start coming down with decrease in fluids.

## 2019-03-16 NOTE — PROGRESS NOTES
Progress Note  Nephrology    Admit Date: 3/11/2019   LOS: 5 days     SUBJECTIVE:     Follow-up For:  ALEIDA    Interval History: No overnight events.  Elevated BPs.  Recorded UOP 1.6L.  Ate all of breakfast.    OBJECTIVE:     Vital Signs (Most Recent)  Temp: 98 °F (36.7 °C) (03/16/19 0734)  Pulse: 63 (03/16/19 0734)  Resp: 18 (03/16/19 0734)  BP: (!) 168/79 (03/16/19 0734)  SpO2: 97 % (03/16/19 0734)    Vital Signs Range (Last 24H):  Temp:  [97.2 °F (36.2 °C)-98.5 °F (36.9 °C)]   Pulse:  [57-75]   Resp:  [18-20]   BP: (158-197)/(72-92)   SpO2:  [97 %-100 %]     Review of Systems:   Constitutional: no fever or chills   Respiratory: no cough or shortness of breath   Cardiovascular: no chest pain or palpitations   Gastrointestinal: no nausea or vomiting, no abdominal pain or change in bowel habits   Genitourinary: no hematuria or dysuria   Musculoskeletal: no arthralgias or myalgias   Neurological: no seizures or tremors    Physical Exam:  General appearance: Well developed, well nourished  Eyes:  Conjunctivae/corneas clear. EOMI.  Lungs: Normal respiratory effort,   clear to auscultation bilaterally   Heart: Regular rate and rhythm, S1, S2 normal, no murmur, rub or charisse.  Abdomen: Soft, obese; bowel sounds normal; no masses,  no organomegaly  Extremities: No cyanosis or clubbing. No edema.    Skin: Skin color, texture, turgor normal. No rashes or lesions  Neurologic: Normal strength and tone. No focal numbness or weakness       Laboratory:  Recent Labs   Lab 03/14/19  0422 03/15/19  0431 03/16/19  0415    141 138   K 3.9 4.5 4.0    103 104   CO2 29 28 24   BUN 36* 31* 28*   CREATININE 3.1* 2.5* 2.4*   CALCIUM 8.5* 9.6 9.4   PHOS 3.1 3.6 3.5     Recent Labs   Lab 03/13/19  0753 03/14/19  0730 03/15/19  0431   WBC 7.95 7.59 6.84   HGB 9.3* 9.1* 10.0*   HCT 27.3* 27.2* 29.6*    221 251        Diagnostic Results:  Labs: Reviewed    ASSESSMENT/PLAN:     1. Resolving ALEIDA on mild CKD III (baseline Creat  1.3) secondary to bilateral hydro/ROBLEDO/retention/Prostate Ca (N17.9, n18.3, N13.30, C61):  Creat markedly improved following release of hydro/ROBLEDO.  Avoid NSAIDS/Bactrim.  Stop IVFs, as has excellent po intake.  Renally dose meds, avoid nephrotoxins, and monitor I/O's closely.  2. Postobstructive diuresis (R35.8): Much reduced UOP after slowing rate of IVFs.  Stop IVFs as above.  Encouraged pt to drink liquids and must get accurate I/O's..   3. HTN (I12.9):  On BB/CCB.  Stopping IVFs should help with hypertension.  No ACE/ARB at this time.       Thank you for allowing me to participate in the care of this patient.      Jani Deutsch MD  Nephrology

## 2019-03-16 NOTE — ASSESSMENT & PLAN NOTE
Worsening hydronephrosis from prior indicating bladder outlet obstruction  Elevated PSA consistent with prostate cancer.  Will need outpatient prostate biopsy.  Cystoscopy 3/12 with bilateral obstructions relieved with stenting.  Post-obstructive diuresis is still considerable.  Patient on IVF and has been encouraged to drink fluids.  Fluids decreased yesterday.

## 2019-03-16 NOTE — ASSESSMENT & PLAN NOTE
- As above, now 12 liters negative since stents placed, probably more as I/O not accurate.  - Encourage him to drink more.

## 2019-03-17 LAB
ALBUMIN SERPL BCP-MCNC: 2.7 G/DL
ANION GAP SERPL CALC-SCNC: 12 MMOL/L
ANION GAP SERPL CALC-SCNC: 7 MMOL/L
BUN SERPL-MCNC: 43 MG/DL
BUN SERPL-MCNC: 49 MG/DL
CALCIUM SERPL-MCNC: 8.3 MG/DL
CALCIUM SERPL-MCNC: 8.5 MG/DL
CHLORIDE SERPL-SCNC: 97 MMOL/L
CHLORIDE SERPL-SCNC: 98 MMOL/L
CO2 SERPL-SCNC: 20 MMOL/L
CO2 SERPL-SCNC: 24 MMOL/L
CREAT SERPL-MCNC: 5.4 MG/DL
CREAT SERPL-MCNC: 6.5 MG/DL
EST. GFR  (AFRICAN AMERICAN): 11 ML/MIN/1.73 M^2
EST. GFR  (AFRICAN AMERICAN): 9 ML/MIN/1.73 M^2
EST. GFR  (NON AFRICAN AMERICAN): 10 ML/MIN/1.73 M^2
EST. GFR  (NON AFRICAN AMERICAN): 8 ML/MIN/1.73 M^2
GLUCOSE SERPL-MCNC: 103 MG/DL
GLUCOSE SERPL-MCNC: 130 MG/DL
MAGNESIUM SERPL-MCNC: 1.7 MG/DL
PHOSPHATE SERPL-MCNC: 4.1 MG/DL
PHOSPHATE SERPL-MCNC: 4.4 MG/DL
POCT GLUCOSE: 105 MG/DL (ref 70–110)
POCT GLUCOSE: 114 MG/DL (ref 70–110)
POCT GLUCOSE: 132 MG/DL (ref 70–110)
POTASSIUM SERPL-SCNC: 4 MMOL/L
POTASSIUM SERPL-SCNC: 4.2 MMOL/L
SODIUM SERPL-SCNC: 129 MMOL/L
SODIUM SERPL-SCNC: 129 MMOL/L

## 2019-03-17 PROCEDURE — 25000003 PHARM REV CODE 250: Performed by: NURSE PRACTITIONER

## 2019-03-17 PROCEDURE — 83735 ASSAY OF MAGNESIUM: CPT

## 2019-03-17 PROCEDURE — 25000003 PHARM REV CODE 250: Performed by: INTERNAL MEDICINE

## 2019-03-17 PROCEDURE — 80048 BASIC METABOLIC PNL TOTAL CA: CPT

## 2019-03-17 PROCEDURE — 25000003 PHARM REV CODE 250: Performed by: HOSPITALIST

## 2019-03-17 PROCEDURE — 94761 N-INVAS EAR/PLS OXIMETRY MLT: CPT

## 2019-03-17 PROCEDURE — 63600175 PHARM REV CODE 636 W HCPCS: Performed by: NURSE PRACTITIONER

## 2019-03-17 PROCEDURE — 80069 RENAL FUNCTION PANEL: CPT

## 2019-03-17 PROCEDURE — 99232 SBSQ HOSP IP/OBS MODERATE 35: CPT | Mod: ,,, | Performed by: HOSPITALIST

## 2019-03-17 PROCEDURE — 99232 PR SUBSEQUENT HOSPITAL CARE,LEVL II: ICD-10-PCS | Mod: ,,, | Performed by: HOSPITALIST

## 2019-03-17 PROCEDURE — 84100 ASSAY OF PHOSPHORUS: CPT

## 2019-03-17 PROCEDURE — 36415 COLL VENOUS BLD VENIPUNCTURE: CPT

## 2019-03-17 PROCEDURE — 11000001 HC ACUTE MED/SURG PRIVATE ROOM

## 2019-03-17 RX ORDER — ONDANSETRON 2 MG/ML
4 INJECTION INTRAMUSCULAR; INTRAVENOUS EVERY 8 HOURS PRN
Status: DISCONTINUED | OUTPATIENT
Start: 2019-03-17 | End: 2019-03-21 | Stop reason: HOSPADM

## 2019-03-17 RX ORDER — SODIUM CHLORIDE 9 MG/ML
INJECTION, SOLUTION INTRAVENOUS CONTINUOUS
Status: DISCONTINUED | OUTPATIENT
Start: 2019-03-17 | End: 2019-03-19

## 2019-03-17 RX ADMIN — ATENOLOL 50 MG: 25 TABLET ORAL at 08:03

## 2019-03-17 RX ADMIN — BICALUTAMIDE 50 MG: 50 TABLET, FILM COATED ORAL at 08:03

## 2019-03-17 RX ADMIN — HYDRALAZINE HYDROCHLORIDE 25 MG: 25 TABLET, FILM COATED ORAL at 03:03

## 2019-03-17 RX ADMIN — HYDROCODONE BITARTRATE AND ACETAMINOPHEN 1 TABLET: 5; 325 TABLET ORAL at 08:03

## 2019-03-17 RX ADMIN — AMLODIPINE BESYLATE 5 MG: 5 TABLET ORAL at 08:03

## 2019-03-17 RX ADMIN — ONDANSETRON 4 MG: 2 INJECTION INTRAMUSCULAR; INTRAVENOUS at 10:03

## 2019-03-17 RX ADMIN — SODIUM CHLORIDE: 0.9 INJECTION, SOLUTION INTRAVENOUS at 01:03

## 2019-03-17 RX ADMIN — ATORVASTATIN CALCIUM 20 MG: 20 TABLET, FILM COATED ORAL at 08:03

## 2019-03-17 RX ADMIN — HYDROCODONE BITARTRATE AND ACETAMINOPHEN 1 TABLET: 5; 325 TABLET ORAL at 02:03

## 2019-03-17 RX ADMIN — STANDARDIZED SENNA CONCENTRATE AND DOCUSATE SODIUM 1 TABLET: 8.6; 5 TABLET, FILM COATED ORAL at 08:03

## 2019-03-17 RX ADMIN — SODIUM CHLORIDE: 0.9 INJECTION, SOLUTION INTRAVENOUS at 09:03

## 2019-03-17 NOTE — PROGRESS NOTES
[facility] Hospital Medicine  Progress Note    Patient Name: Elissa Mullins  MRN: [id number]  Patient Class: IP- Inpatient   Admission Date: 3/11/2019  Length of Stay: 6 days  Attending Physician: Kalpana Bro MD  Primary Care Provider: [facility]        Subjective:     Principal Problem:Bilateral hydronephrosis    HPI:  Mr. Mullins is a 74 year old man who presented with pain in the right flank for one day associated with nausea and vomiting.  He has had urinary frequency but no dysuria, hematuria fever or chills.  His urinary stream has been weaker for the last 2 months.  He presented here with left flank pain in December last year, and a CT done at that time showed severe left-sided and moderate right-sided hydronephrosis with and a soft tissue density thought to be the enlarged prostate protruding  into the bladder lumen.  He had a Garcia placed in the ED with improvement in symptoms and was removed prior to discharge.  He was supposed to follow up with urology but did not.  Repeat CT done this presentation showed worsening bilateral hydronephrosis and enlargement of the prostate.  Labs showed ALEIDA with a creatinine of 4.5.  A Garcia was placed with only 20 mL urine produced.  He was admitted with urology consultation.    Hospital Course:  Patient was admitted with Garcia in placed for bladder outlet obstruction and urology was consulted.  Noted markedly elevated PSA and other findings consistent with advanced prostate cancer, although he has not had a biopsy yet.  Cystoscopy was planned with retrograde pyelogram and possible stent placement with Dr. Cedeño.  On 3/12 he underwent bilateral ureteroscopy, retrograde pyelogram and bilateral ureteral stent placement.  He was returned to the floor with Garcia in place and after relief of the obstruction had a considerable post-obstructive diuresis.  He remained on IV fluids while his fluid balance improved.  Voiding trial  done 3/14.  He continued to diurese until fluid balance was -11 liters.  At that point nephrology was consulted for assistance with fluids.  IV fluids were decreased and the diuresis started to resolve.              Interval History:  Had headaches and was nauseated last night, threw up multiple times and this morning creatinine has doubled.  Seen by renal and IVF restarted.    Review of Systems   Constitutional: Negative for chills and fever.   Respiratory: Negative for cough and shortness of breath.    Cardiovascular: Negative for chest pain and palpitations.   Gastrointestinal: Positive for nausea and vomiting.     Objective:     Vital Signs (Most Recent):  Temp: 98.4 °F (36.9 °C) (03/17/19 1515)  Pulse: 63 (03/17/19 1515)  Resp: 16 (03/17/19 1515)  BP: (!) 181/81 (03/17/19 1515)  SpO2: 96 % (03/17/19 1515) Vital Signs (24h Range):  Temp:  [97.2 °F (36.2 °C)-98.7 °F (37.1 °C)] 98.4 °F (36.9 °C)  Pulse:  [56-70] 63  Resp:  [16-20] 16  SpO2:  [96 %-100 %] 96 %  BP: (138-181)/(72-85) 181/81     Weight: 70.1 kg (154 lb 8.7 oz)  Body mass index is 25.72 kg/m².    Intake/Output Summary (Last 24 hours) at 3/17/2019 1518  Last data filed at 3/17/2019 0400  Gross per 24 hour   Intake 750 ml   Output 1450 ml   Net -700 ml      Physical Exam   Constitutional: He is oriented to person, place, and time. He appears well-developed and well-nourished. No distress.   Pleasant.   HENT:   Head: Normocephalic.   Eyes: Conjunctivae are normal. Pupils are equal, round, and reactive to light.   Neck: Neck supple. No thyromegaly present.   Cardiovascular: Normal rate, regular rhythm, normal heart sounds and intact distal pulses. Exam reveals no gallop and no friction rub.   No murmur heard.  Pulmonary/Chest: Effort normal and breath sounds normal.   Abdominal: Soft. Bowel sounds are normal. He exhibits no distension. There is no tenderness.   Musculoskeletal: Normal range of motion. He exhibits no edema.   Lymphadenopathy:     He has no  cervical adenopathy.   Neurological: He is alert and oriented to person, place, and time.   Strength equal and symmetric   Skin: Skin is warm and dry. No rash noted.   Psychiatric: He has a normal mood and affect. His behavior is normal. Thought content normal.       Significant Labs:   BMP:   Recent Labs   Lab 03/17/19  0438      *   K 4.0   CL 98   CO2 24   BUN 43*   CREATININE 5.4*   CALCIUM 8.5*   MG 1.7       Significant Imaging: I have reviewed all pertinent imaging results/findings within the past 24 hours.    Assessment/Plan:      * Bilateral hydronephrosis    Worsening hydronephrosis from prior indicating bladder outlet obstruction  Elevated PSA consistent with prostate cancer.  Will need outpatient prostate biopsy.  Cystoscopy 3/12 with bilateral obstructions relieved with stenting.     Elevated PSA           Postobstructive diuresis    - As above, now -13.5 liters negative since stents placed, probably more as I/O not accurate.  - Encourage him to drink more.       Hyperlipidemia    Continue statin       Essential hypertension    - Continue amlodipine, atenolol  - Hold HCTZ, losartan with ALEIDA  - BP up with IVF, hopefully will start coming down with decrease in fluids.       Type 2 diabetes mellitus, without long-term current use of insulin    Metformin held.  Sliding scale insulin.       ALEIDA (acute kidney injury)    - Obstructive uropathy  - IVF was decreased but now being resumed for volume depletion       VTE Risk Mitigation (From admission, onward)        Ordered     IP VTE HIGH RISK PATIENT  Once      03/11/19 1343     Place ROSEANNA hose  Until discontinued      03/11/19 1343     Place sequential compression device  Until discontinued      03/11/19 1343              Kalpana Khan MD  Department of Hospital Medicine   [facility]

## 2019-03-17 NOTE — SUBJECTIVE & OBJECTIVE
Interval History:  Had headaches and was nauseated last night, threw up multiple times and this morning creatinine has doubled.  Seen by renal and IVF restarted.    Review of Systems   Constitutional: Negative for chills and fever.   Respiratory: Negative for cough and shortness of breath.    Cardiovascular: Negative for chest pain and palpitations.   Gastrointestinal: Positive for nausea and vomiting.     Objective:     Vital Signs (Most Recent):  Temp: 98.4 °F (36.9 °C) (03/17/19 1515)  Pulse: 63 (03/17/19 1515)  Resp: 16 (03/17/19 1515)  BP: (!) 181/81 (03/17/19 1515)  SpO2: 96 % (03/17/19 1515) Vital Signs (24h Range):  Temp:  [97.2 °F (36.2 °C)-98.7 °F (37.1 °C)] 98.4 °F (36.9 °C)  Pulse:  [56-70] 63  Resp:  [16-20] 16  SpO2:  [96 %-100 %] 96 %  BP: (138-181)/(72-85) 181/81     Weight: 70.1 kg (154 lb 8.7 oz)  Body mass index is 25.72 kg/m².    Intake/Output Summary (Last 24 hours) at 3/17/2019 1518  Last data filed at 3/17/2019 0400  Gross per 24 hour   Intake 750 ml   Output 1450 ml   Net -700 ml      Physical Exam   Constitutional: He is oriented to person, place, and time. He appears well-developed and well-nourished. No distress.   Pleasant.   HENT:   Head: Normocephalic.   Eyes: Conjunctivae are normal. Pupils are equal, round, and reactive to light.   Neck: Neck supple. No thyromegaly present.   Cardiovascular: Normal rate, regular rhythm, normal heart sounds and intact distal pulses. Exam reveals no gallop and no friction rub.   No murmur heard.  Pulmonary/Chest: Effort normal and breath sounds normal.   Abdominal: Soft. Bowel sounds are normal. He exhibits no distension. There is no tenderness.   Musculoskeletal: Normal range of motion. He exhibits no edema.   Lymphadenopathy:     He has no cervical adenopathy.   Neurological: He is alert and oriented to person, place, and time.   Strength equal and symmetric   Skin: Skin is warm and dry. No rash noted.   Psychiatric: He has a normal mood and affect.  His behavior is normal. Thought content normal.       Significant Labs:   BMP:   Recent Labs   Lab 03/17/19  0438      *   K 4.0   CL 98   CO2 24   BUN 43*   CREATININE 5.4*   CALCIUM 8.5*   MG 1.7       Significant Imaging: I have reviewed all pertinent imaging results/findings within the past 24 hours.

## 2019-03-17 NOTE — ASSESSMENT & PLAN NOTE
Worsening hydronephrosis from prior indicating bladder outlet obstruction  Elevated PSA consistent with prostate cancer.  Will need outpatient prostate biopsy.  Cystoscopy 3/12 with bilateral obstructions relieved with stenting.

## 2019-03-17 NOTE — ASSESSMENT & PLAN NOTE
- As above, now -13.5 liters negative since stents placed, probably more as I/O not accurate.  - Encourage him to drink more.

## 2019-03-18 LAB
ANION GAP SERPL CALC-SCNC: 8 MMOL/L
BUN SERPL-MCNC: 50 MG/DL
CALCIUM SERPL-MCNC: 8.2 MG/DL
CHLORIDE SERPL-SCNC: 100 MMOL/L
CO2 SERPL-SCNC: 21 MMOL/L
CREAT SERPL-MCNC: 7.1 MG/DL
EST. GFR  (AFRICAN AMERICAN): 8 ML/MIN/1.73 M^2
EST. GFR  (NON AFRICAN AMERICAN): 7 ML/MIN/1.73 M^2
GLUCOSE SERPL-MCNC: 98 MG/DL
MAGNESIUM SERPL-MCNC: 1.8 MG/DL
PHOSPHATE SERPL-MCNC: 4.7 MG/DL
POCT GLUCOSE: 101 MG/DL (ref 70–110)
POCT GLUCOSE: 112 MG/DL (ref 70–110)
POCT GLUCOSE: 124 MG/DL (ref 70–110)
POCT GLUCOSE: 157 MG/DL (ref 70–110)
POCT GLUCOSE: 161 MG/DL (ref 70–110)
POTASSIUM SERPL-SCNC: 4.1 MMOL/L
SODIUM SERPL-SCNC: 129 MMOL/L

## 2019-03-18 PROCEDURE — 84100 ASSAY OF PHOSPHORUS: CPT

## 2019-03-18 PROCEDURE — 83735 ASSAY OF MAGNESIUM: CPT

## 2019-03-18 PROCEDURE — 25000003 PHARM REV CODE 250: Performed by: INTERNAL MEDICINE

## 2019-03-18 PROCEDURE — 25500020 PHARM REV CODE 255: Performed by: RADIOLOGY

## 2019-03-18 PROCEDURE — 25000003 PHARM REV CODE 250: Performed by: NURSE PRACTITIONER

## 2019-03-18 PROCEDURE — 99233 PR SUBSEQUENT HOSPITAL CARE,LEVL III: ICD-10-PCS | Mod: ,,, | Performed by: HOSPITALIST

## 2019-03-18 PROCEDURE — 99233 PR SUBSEQUENT HOSPITAL CARE,LEVL III: ICD-10-PCS | Mod: ,,, | Performed by: NURSE PRACTITIONER

## 2019-03-18 PROCEDURE — 25000003 PHARM REV CODE 250: Performed by: RADIOLOGY

## 2019-03-18 PROCEDURE — 99233 SBSQ HOSP IP/OBS HIGH 50: CPT | Mod: ,,, | Performed by: HOSPITALIST

## 2019-03-18 PROCEDURE — 80048 BASIC METABOLIC PNL TOTAL CA: CPT

## 2019-03-18 PROCEDURE — 25000003 PHARM REV CODE 250: Performed by: HOSPITALIST

## 2019-03-18 PROCEDURE — 36415 COLL VENOUS BLD VENIPUNCTURE: CPT

## 2019-03-18 PROCEDURE — C1769 GUIDE WIRE: HCPCS | Performed by: RADIOLOGY

## 2019-03-18 PROCEDURE — 94761 N-INVAS EAR/PLS OXIMETRY MLT: CPT

## 2019-03-18 PROCEDURE — C1729 CATH, DRAINAGE: HCPCS | Performed by: RADIOLOGY

## 2019-03-18 PROCEDURE — 99233 SBSQ HOSP IP/OBS HIGH 50: CPT | Mod: ,,, | Performed by: NURSE PRACTITIONER

## 2019-03-18 PROCEDURE — 27201423 OPTIME MED/SURG SUP & DEVICES STERILE SUPPLY: Performed by: RADIOLOGY

## 2019-03-18 PROCEDURE — 63600175 PHARM REV CODE 636 W HCPCS: Performed by: RADIOLOGY

## 2019-03-18 PROCEDURE — 11000001 HC ACUTE MED/SURG PRIVATE ROOM

## 2019-03-18 RX ORDER — FENTANYL CITRATE 50 UG/ML
INJECTION, SOLUTION INTRAMUSCULAR; INTRAVENOUS
Status: DISCONTINUED | OUTPATIENT
Start: 2019-03-18 | End: 2019-03-18 | Stop reason: HOSPADM

## 2019-03-18 RX ORDER — MIDAZOLAM HYDROCHLORIDE 1 MG/ML
INJECTION, SOLUTION INTRAMUSCULAR; INTRAVENOUS
Status: DISCONTINUED | OUTPATIENT
Start: 2019-03-18 | End: 2019-03-18 | Stop reason: HOSPADM

## 2019-03-18 RX ORDER — HEPARIN SOD,PORCINE/0.9 % NACL 1000/500ML
INTRAVENOUS SOLUTION INTRAVENOUS
Status: DISCONTINUED | OUTPATIENT
Start: 2019-03-18 | End: 2019-03-18 | Stop reason: HOSPADM

## 2019-03-18 RX ORDER — LIDOCAINE HYDROCHLORIDE 10 MG/ML
INJECTION INFILTRATION; PERINEURAL
Status: DISCONTINUED | OUTPATIENT
Start: 2019-03-18 | End: 2019-03-18 | Stop reason: HOSPADM

## 2019-03-18 RX ORDER — CIPROFLOXACIN 2 MG/ML
INJECTION, SOLUTION INTRAVENOUS
Status: DISCONTINUED | OUTPATIENT
Start: 2019-03-18 | End: 2019-03-20

## 2019-03-18 RX ADMIN — BICALUTAMIDE 50 MG: 50 TABLET, FILM COATED ORAL at 08:03

## 2019-03-18 RX ADMIN — STANDARDIZED SENNA CONCENTRATE AND DOCUSATE SODIUM 1 TABLET: 8.6; 5 TABLET, FILM COATED ORAL at 08:03

## 2019-03-18 RX ADMIN — SODIUM CHLORIDE: 0.9 INJECTION, SOLUTION INTRAVENOUS at 04:03

## 2019-03-18 RX ADMIN — HYDROCODONE BITARTRATE AND ACETAMINOPHEN 1 TABLET: 5; 325 TABLET ORAL at 04:03

## 2019-03-18 RX ADMIN — SODIUM CHLORIDE: 0.9 INJECTION, SOLUTION INTRAVENOUS at 07:03

## 2019-03-18 RX ADMIN — ATORVASTATIN CALCIUM 20 MG: 20 TABLET, FILM COATED ORAL at 08:03

## 2019-03-18 RX ADMIN — HYDRALAZINE HYDROCHLORIDE 25 MG: 25 TABLET, FILM COATED ORAL at 08:03

## 2019-03-18 RX ADMIN — AMLODIPINE BESYLATE 5 MG: 5 TABLET ORAL at 08:03

## 2019-03-18 RX ADMIN — ATENOLOL 50 MG: 25 TABLET ORAL at 08:03

## 2019-03-18 NOTE — PROGRESS NOTES
[facility] Hospital Medicine  Progress Note    Patient Name: Elissa Mullins  MRN: [id number]  Patient Class: IP- Inpatient   Admission Date: 3/11/2019  Length of Stay: 7 days  Attending Physician: Kalpana Bro MD  Primary Care Provider: [facility]        Subjective:     Principal Problem:Bilateral hydronephrosis    HPI:  Mr. Mullins is a 74 year old man who presented with pain in the right flank for one day associated with nausea and vomiting.  He has had urinary frequency but no dysuria, hematuria fever or chills.  His urinary stream has been weaker for the last 2 months.  He presented here with left flank pain in December last year, and a CT done at that time showed severe left-sided and moderate right-sided hydronephrosis with and a soft tissue density thought to be the enlarged prostate protruding  into the bladder lumen.  He had a Garcia placed in the ED with improvement in symptoms and was removed prior to discharge.  He was supposed to follow up with urology but did not.  Repeat CT done this presentation showed worsening bilateral hydronephrosis and enlargement of the prostate.  Labs showed ALEIDA with a creatinine of 4.5.  A Garcia was placed with only 20 mL urine produced.  He was admitted with urology consultation.    Hospital Course:  Patient was admitted with Garcia in placed for bladder outlet obstruction and urology was consulted.  Noted markedly elevated PSA and other findings consistent with advanced prostate cancer, although he has not had a biopsy yet.  Cystoscopy was planned with retrograde pyelogram and possible stent placement with Dr. Cedeño.  On 3/12 he underwent bilateral ureteroscopy, retrograde pyelogram and bilateral ureteral stent placement.  He was returned to the floor with Garcia in place and after relief of the obstruction had a considerable post-obstructive diuresis.  He remained on IV fluids while his fluid balance improved.  Voiding trial  done 3/14.  He continued to diurese until fluid balance was -11 liters.  At that point nephrology was consulted for assistance with fluids.  IV fluids were decreased and the diuresis started to improve, but he became nauseated with vomiting over the weekend 3/16-17, and BUN/creatinine started to increase again.  Bladder scans showed minimal urine in the bladder, and retroperitoneal ultrasound showed persistent moderate right and moderate-severe left hydronephrosis.  IR was consulted to place percutaneous nephrostomy tubes.              Interval History:  Not feeling well, lying on his side in bed.  Ultrasound showed persistent hydronephrosis.  IR has been consulted for PCN tubes and he is NPO.    Review of Systems   Constitutional: Negative for chills and fever.   Respiratory: Negative for cough and shortness of breath.    Cardiovascular: Negative for chest pain and palpitations.   Gastrointestinal: Positive for nausea.     Objective:     Vital Signs (Most Recent):  Temp: 96.7 °F (35.9 °C) (03/18/19 1155)  Pulse: 60 (03/18/19 1155)  Resp: 16 (03/18/19 1155)  BP: (!) 166/74 (03/18/19 1155)  SpO2: 100 % (03/18/19 1155) Vital Signs (24h Range):  Temp:  [96.7 °F (35.9 °C)-98.4 °F (36.9 °C)] 96.7 °F (35.9 °C)  Pulse:  [60-66] 60  Resp:  [16-18] 16  SpO2:  [96 %-100 %] 100 %  BP: (151-181)/(69-81) 166/74     Weight: 70.1 kg (154 lb 8.7 oz)  Body mass index is 25.72 kg/m².    Intake/Output Summary (Last 24 hours) at 3/18/2019 1429  Last data filed at 3/18/2019 1000  Gross per 24 hour   Intake 1760 ml   Output 775 ml   Net 985 ml      Physical Exam   Constitutional: He is oriented to person, place, and time. He appears well-developed and well-nourished. No distress.   Ill-appearing.   HENT:   Head: Normocephalic.   Eyes: Conjunctivae are normal. Pupils are equal, round, and reactive to light.   Neck: Neck supple. No thyromegaly present.   Cardiovascular: Normal rate, regular rhythm, normal heart sounds and intact distal  pulses. Exam reveals no gallop and no friction rub.   No murmur heard.  Pulmonary/Chest: Effort normal and breath sounds normal.   Abdominal: Soft. Bowel sounds are normal. He exhibits no distension. There is no tenderness.   Musculoskeletal: Normal range of motion. He exhibits no edema.   Lymphadenopathy:     He has no cervical adenopathy.   Neurological: He is alert and oriented to person, place, and time.   Strength equal and symmetric   Skin: Skin is warm and dry. No rash noted.   Psychiatric: He has a normal mood and affect. His behavior is normal. Thought content normal.       Significant Labs:   BMP:   Recent Labs   Lab 03/18/19  0447   GLU 98   *   K 4.1      CO2 21*   BUN 50*   CREATININE 7.1*   CALCIUM 8.2*   MG 1.8       Significant Imaging: I have reviewed all pertinent imaging results/findings within the past 24 hours.    Assessment/Plan:      * Bilateral hydronephrosis    - Worsening hydronephrosis from prior indicating bladder outlet obstruction  - Elevated PSA consistent with prostate cancer.  Will need outpatient prostate biopsy.  - Cystoscopy 3/12 with bilateral obstructions relieved with stenting, but he has had recurrence and persistent hydronephrosis.  - IR to place bilateral PCN.     Elevated PSA           Postobstructive diuresis    - As above, now -13.5 liters negative since stents placed, probably more as I/O not accurate.  - Continue IVF.     Hyperlipidemia    Continue statin       Essential hypertension    - Continue amlodipine, atenolol  - Hold HCTZ, losartan with ALEIDA  - BP has been up with IVF.       Type 2 diabetes mellitus, without long-term current use of insulin    Metformin held.  Sliding scale insulin.       ALEIDA (acute kidney injury)    - Obstructive uropathy.  Had good improvement after stenting, but BUN/creatinine now worsening.  Not bladder outlet obstruction.  - Repeat ultrasound shows persistent hydronephrosis  - IVF was decreased but now being resumed for volume  depletion       VTE Risk Mitigation (From admission, onward)        Ordered     IP VTE HIGH RISK PATIENT  Once      03/11/19 1343     Place ROSEANNA hose  Until discontinued      03/11/19 1343     Place sequential compression device  Until discontinued      03/11/19 1343              Kalpana Khna MD  Department of Hospital Medicine   [facility]

## 2019-03-18 NOTE — PROGRESS NOTES
Progress Note  Nephrology    Admit Date: 3/11/2019   LOS: 7 days     SUBJECTIVE:     Follow-up For:  ALEIDA    Interval History: Pt with N/V since yesterday afternoon. Good UOP and pink tinged urine. Does not feel well.    OBJECTIVE:     Vital Signs (Most Recent)  Temp: 98.1 °F (36.7 °C) (03/18/19 0728)  Pulse: 66 (03/18/19 0728)  Resp: 16 (03/18/19 0728)  BP: (!) 171/78 (03/18/19 0728)  SpO2: 99 % (03/18/19 0728)    Vital Signs Range (Last 24H):  Temp:  [97.8 °F (36.6 °C)-98.4 °F (36.9 °C)]   Pulse:  [58-66]   Resp:  [16-18]   BP: (138-181)/(69-81)   SpO2:  [96 %-100 %]     Review of Systems:   Constitutional: no fever or chills   Respiratory: no cough or shortness of breath   Cardiovascular: no chest pain or palpitations   Gastrointestinal: no nausea or vomiting, no abdominal pain or change in bowel habits   Genitourinary: no hematuria or dysuria   Musculoskeletal: no arthralgias or myalgias   Neurological: no seizures or tremors    Physical Exam:  General appearance: Well developed, well nourished  Eyes:  Conjunctivae/corneas clear. EOMI.  Lungs: Normal respiratory effort,   clear to auscultation bilaterally   Heart: Regular rate and rhythm, S1, S2 normal, no murmur, rub or charisse.  Abdomen: Soft, obese; bowel sounds normal; no masses,  no organomegaly  Extremities: No cyanosis or clubbing. No edema.    Skin: Skin color, texture, turgor normal. No rashes or lesions  Neurologic: Normal strength and tone. No focal numbness or weakness       Laboratory:  Recent Labs   Lab 03/17/19  0438 03/17/19 2010 03/18/19  0447   * 129* 129*   K 4.0 4.2 4.1   CL 98 97 100   CO2 24 20* 21*   BUN 43* 49* 50*   CREATININE 5.4* 6.5* 7.1*   CALCIUM 8.5* 8.3* 8.2*   PHOS 4.1 4.4 4.7*     Recent Labs   Lab 03/13/19  0753 03/14/19  0730 03/15/19  0431   WBC 7.95 7.59 6.84   HGB 9.3* 9.1* 10.0*   HCT 27.3* 27.2* 29.6*    221 251        Diagnostic Results:  Labs: Reviewed    ASSESSMENT/PLAN:     1. Resolving ALEIDA on mild CKD III  (baseline Creat 1.3) secondary to bilateral hydro/ROBLEDO/retention/Prostate Ca (N17.9, n18.3, N13.30, C61):  Creat markedly improved following release of hydro/ROBLEDO.  Avoid NSAIDS/Bactrim. Resume IVFs and recheck labs later today. Also check bladder scan.  Renally dose meds, avoid nephrotoxins, and monitor I/O's closely.  2. Postobstructive diuresis (R35.8): Reasonable UOP.. Will check bladder scan and otherwise as above.  Encouraged pt to drink liquids and must get accurate I/O's..   3. HTN (I12.9):  On BB/CCB.   No ACE/ARB at this time.       Thank you for allowing me to participate in the care of this patient.      Angélica French MD  Nephrology

## 2019-03-18 NOTE — ASSESSMENT & PLAN NOTE
- As above, now -13.5 liters negative since stents placed, probably more as I/O not accurate.  - Continue IVF.

## 2019-03-18 NOTE — SUBJECTIVE & OBJECTIVE
Interval History:  Not feeling well, lying on his side in bed.  Ultrasound showed persistent hydronephrosis.  IR has been consulted for PCN tubes and he is NPO.    Review of Systems   Constitutional: Negative for chills and fever.   Respiratory: Negative for cough and shortness of breath.    Cardiovascular: Negative for chest pain and palpitations.   Gastrointestinal: Positive for nausea.     Objective:     Vital Signs (Most Recent):  Temp: 96.7 °F (35.9 °C) (03/18/19 1155)  Pulse: 60 (03/18/19 1155)  Resp: 16 (03/18/19 1155)  BP: (!) 166/74 (03/18/19 1155)  SpO2: 100 % (03/18/19 1155) Vital Signs (24h Range):  Temp:  [96.7 °F (35.9 °C)-98.4 °F (36.9 °C)] 96.7 °F (35.9 °C)  Pulse:  [60-66] 60  Resp:  [16-18] 16  SpO2:  [96 %-100 %] 100 %  BP: (151-181)/(69-81) 166/74     Weight: 70.1 kg (154 lb 8.7 oz)  Body mass index is 25.72 kg/m².    Intake/Output Summary (Last 24 hours) at 3/18/2019 1429  Last data filed at 3/18/2019 1000  Gross per 24 hour   Intake 1760 ml   Output 775 ml   Net 985 ml      Physical Exam   Constitutional: He is oriented to person, place, and time. He appears well-developed and well-nourished. No distress.   Ill-appearing.   HENT:   Head: Normocephalic.   Eyes: Conjunctivae are normal. Pupils are equal, round, and reactive to light.   Neck: Neck supple. No thyromegaly present.   Cardiovascular: Normal rate, regular rhythm, normal heart sounds and intact distal pulses. Exam reveals no gallop and no friction rub.   No murmur heard.  Pulmonary/Chest: Effort normal and breath sounds normal.   Abdominal: Soft. Bowel sounds are normal. He exhibits no distension. There is no tenderness.   Musculoskeletal: Normal range of motion. He exhibits no edema.   Lymphadenopathy:     He has no cervical adenopathy.   Neurological: He is alert and oriented to person, place, and time.   Strength equal and symmetric   Skin: Skin is warm and dry. No rash noted.   Psychiatric: He has a normal mood and affect. His  behavior is normal. Thought content normal.       Significant Labs:   BMP:   Recent Labs   Lab 03/18/19  0447   GLU 98   *   K 4.1      CO2 21*   BUN 50*   CREATININE 7.1*   CALCIUM 8.2*   MG 1.8       Significant Imaging: I have reviewed all pertinent imaging results/findings within the past 24 hours.

## 2019-03-18 NOTE — PROCEDURES
Radiology Post-Procedure Note    Pre Op Diagnosis: B ureteral obstruction  Post Op Diagnosis: Same    Procedure:  B nephrostomy tube placement.    Procedure performed by: George Mckenzie MD    Written Informed Consent Obtained: Yes  Specimen Removed: NO  Estimated Blood Loss: Minimal    Findings:   Successful B nephrostomy tube placement.    Patient tolerated procedure well.    @SIG@

## 2019-03-18 NOTE — PLAN OF CARE
Problem: Adult Inpatient Plan of Care  Goal: Plan of Care Review  Outcome: Ongoing (interventions implemented as appropriate)  Pt on RA. No distress noted. Will continue to monitor.

## 2019-03-18 NOTE — PLAN OF CARE
Patient resting in NAD. VSS on RA. IVF infusing. Pt bladder scanned this morning with 79ml noted. Pt reports only using urinal to void. Safety measures maintained. No needs expressed at this time. Will continue to monitor.

## 2019-03-18 NOTE — SUBJECTIVE & OBJECTIVE
Interval History:   VSS, afebrile  S/p cysto with bilateral stent placement on 3/12 with Dr. Cedeño. Appeared to be related to advanced prostate cancer- started casodex. Creatinine improved initially (2.5), now 7.1   Re consulted for worsening creatinine and continued hydronephrosis   US with moderate right and moderate/severe left hydro   PVR 79 mL, denies difficulty voiding     Review of Systems   Constitutional: Positive for appetite change. Negative for chills and fever.   Respiratory: Negative for chest tightness and shortness of breath.    Cardiovascular: Negative for chest pain and palpitations.   Gastrointestinal: Positive for nausea and vomiting. Negative for abdominal distention and abdominal pain.   Genitourinary: Negative for difficulty urinating, dysuria, flank pain, frequency, hematuria and urgency.   Neurological: Negative for weakness.     Objective:     Temp:  [97.8 °F (36.6 °C)-98.4 °F (36.9 °C)] 98.1 °F (36.7 °C)  Pulse:  [60-66] 66  Resp:  [16-18] 16  SpO2:  [96 %-100 %] 99 %  BP: (151-181)/(69-81) 171/78     Body mass index is 25.72 kg/m².    Date 03/18/19 0700 - 03/19/19 0659   Shift 6359-4895 4863-6942 2377-1155 24 Hour Total   INTAKE   P.O. 250   250   Shift Total(mL/kg) 250(3.6)   250(3.6)   OUTPUT   Urine(mL/kg/hr) 125   125   Shift Total(mL/kg) 125(1.8)   125(1.8)   Weight (kg) 70.1 70.1 70.1 70.1     Bladder Scan Volume (mL): 79 mL (03/18/19 0620)    Drains          None          Physical Exam   Constitutional: He is oriented to person, place, and time. He appears well-developed and well-nourished.   HENT:   Head: Normocephalic and atraumatic.   Cardiovascular: Normal rate, regular rhythm and normal heart sounds.    Pulmonary/Chest: Effort normal and breath sounds normal.   Abdominal: Soft. Bowel sounds are normal. He exhibits no distension. There is no tenderness. There is no CVA tenderness.   Musculoskeletal: Normal range of motion.   Neurological: He is alert and oriented to person,  place, and time.   Skin: Skin is warm and dry.     Psychiatric: He has a normal mood and affect. His behavior is normal.       Significant Labs:    BMP:  Recent Labs   Lab 03/17/19  0438 03/17/19 2010 03/18/19  0447   * 129* 129*   K 4.0 4.2 4.1   CL 98 97 100   CO2 24 20* 21*   BUN 43* 49* 50*   CREATININE 5.4* 6.5* 7.1*   CALCIUM 8.5* 8.3* 8.2*       CBC:   Recent Labs   Lab 03/13/19  0753 03/14/19  0730 03/15/19  0431   WBC 7.95 7.59 6.84   HGB 9.3* 9.1* 10.0*   HCT 27.3* 27.2* 29.6*    221 251       CMP:   Recent Labs   Lab 03/16/19  0415 03/17/19  0438 03/17/19 2010 03/18/19  0447    103 130* 98    129* 129* 129*   K 4.0 4.0 4.2 4.1    98 97 100   CO2 24 24 20* 21*   BUN 28* 43* 49* 50*   CREATININE 2.4* 5.4* 6.5* 7.1*   CALCIUM 9.4 8.5* 8.3* 8.2*   MG 1.7 1.7  --  1.8     Urine Culture:   Recent Labs   Lab 03/14/19  1157   LABURIN No growth     Urine Studies:   Recent Labs   Lab 03/15/19  2116   COLORU Yellow   APPEARANCEUA Hazy*   PHUR 7.0   SPECGRAV 1.010   PROTEINUA 1+*   GLUCUA 1+*   KETONESU Negative   BILIRUBINUA Negative   OCCULTUA 3+*   NITRITE Negative   UROBILINOGEN Negative   LEUKOCYTESUR 1+*   RBCUA 80*   WBCUA 3   BACTERIA Occasional   HYALINECASTS 0       Significant Imaging:  U/S: I have reviewed all results within the past 24 hours and my personal findings are:  moderate hydro (right) and moderate/severe (left) hydro; partially distended bladder

## 2019-03-18 NOTE — CONSULTS
Consult/H&P Note  Interventional Radiology    Consult Requested By: urology    Reason for Consult: rising Cr and persistent hydronephrosis despite B stent placement    SUBJECTIVE:     Chief Complaint: ALEIDA    History of Present Illness: 74 yo M with suspected prostate carcinoma, presents with ALEIDA and B hydro, underwent B ureteral stent placement on 3/12, with initial improvement, but now worsening renal function.    Past Medical History:   Diagnosis Date    Flank pain, chronic     Right sided, due to injury in 20's per pet    Hyperlipidemia     Hypertension      Past Surgical History:   Procedure Laterality Date    CYSTOURETEROSCOPY, WITH RETROGRADE PYELOGRAM AND URETERAL STENT INSERTION (ADD ON ) Bilateral 3/12/2019    Performed by Faraz Cedeño MD at [facility] OR     History reviewed. No pertinent family history.  Social History     Tobacco Use    Smoking status: Never Smoker    Smokeless tobacco: Never Used   Substance Use Topics    Alcohol use: No     Frequency: Never    Drug use: No       Review of Systems:  Constitutional/General:No fever, chills  Hematological/Immuno: no known coagulopathies  Respiratory: no shortness of breath  Cardiovascular: no chest pain  Gastrointestinal: positive for - abdominal pain and appetite loss  Genito-Urinary: no dysuria  Musculoskeletal: negative  Skin: Negative for rash, itching, pigmentation changes, nail or hair changes.  Neurological: no TIA or stroke symptoms  Psychiatric: normal mood/affect, good insight/judgement      OBJECTIVE:     Vital Signs Range (Last 24H):  Temp:  [96.7 °F (35.9 °C)-98.4 °F (36.9 °C)]   Pulse:  [60-66]   Resp:  [16-18]   BP: (151-181)/(69-81)   SpO2:  [96 %-100 %]     Physical Exam:  General- Patient alert and oriented x3 in NAD  ENT- PERRLA,  Neck- No masses  CV- Regular rate and rhythm  Resp-  No increased WOB  GI- Non tender/non-distended  Extrem- No cyanosis, clubbing, edema.   Derm- No rashes, masses, or lesions noted  Neuro-  No focal  deficits noted.     Physical Exam  Body mass index is 25.72 kg/m².    Scheduled Meds:    amLODIPine  5 mg Oral Daily    atenolol  50 mg Oral Daily    atorvastatin  20 mg Oral Daily    bicalutamide  50 mg Oral Daily    senna-docusate 8.6-50 mg  1 tablet Oral Daily     Continuous Infusions:    sodium chloride 0.9% 125 mL/hr at 03/18/19 0453     PRN Meds:acetaminophen, dextrose 50%, dextrose 50%, glucagon (human recombinant), glucose, glucose, hydrALAZINE, HYDROcodone-acetaminophen, insulin aspart U-100, ondansetron, polyethylene glycol, sodium chloride 0.9%, sodium chloride 0.9%    Allergies: Review of patient's allergies indicates:  No Known Allergies    Labs:  No results for input(s): INR in the last 168 hours.    Invalid input(s):  PT,  PTT    Recent Labs   Lab 03/15/19  0431   WBC 6.84   HGB 10.0*   HCT 29.6*   MCV 94         Recent Labs   Lab 03/17/19 2010 03/18/19  0447   * 98   * 129*   K 4.2 4.1   CL 97 100   CO2 20* 21*   BUN 49* 50*   CREATININE 6.5* 7.1*   CALCIUM 8.3* 8.2*   MG  --  1.8   ALBUMIN 2.7*  --        Vitals (Most Recent):  Temp: 96.7 °F (35.9 °C) (03/18/19 1155)  Pulse: 60 (03/18/19 1155)  Resp: 16 (03/18/19 1155)  BP: (!) 166/74 (03/18/19 1155)  SpO2: 100 % (03/18/19 1155)    ASA: 3  Mallampati: 2    Consent obtained    ASSESSMENT/PLAN:     B nephrostomy tube placement.    Active Hospital Problems    Diagnosis  POA    *Bilateral hydronephrosis [N13.30]  Yes    Postobstructive diuresis [R35.8]  No    Elevated PSA [R97.20]  Yes    ALEIDA (acute kidney injury) [N17.9]  Yes    Type 2 diabetes mellitus, without long-term current use of insulin [E11.9]  Yes    Essential hypertension [I10]  Yes      Resolved Hospital Problems    Diagnosis Date Resolved POA    Hypomagnesemia [E83.42] 03/15/2019 Yes    Metabolic acidosis [E87.2] 03/13/2019 Yes           George Mckenzie MD

## 2019-03-18 NOTE — ASSESSMENT & PLAN NOTE
--Cysto with klaus stent placement on 3/12 with Dr. Cedeño. Appears to be related to advanced prostate cancer.   --Repeat US this morning with continued bilateral hydronephrosis   --Worsening creatinine, now 7.1  --Consult IR for bilateral PCN placement today  --NPO

## 2019-03-18 NOTE — ASSESSMENT & PLAN NOTE
- Obstructive uropathy.  Had good improvement after stenting, but BUN/creatinine now worsening.  Not bladder outlet obstruction.  - Repeat ultrasound shows persistent hydronephrosis  - IVF was decreased but now being resumed for volume depletion

## 2019-03-18 NOTE — ASSESSMENT & PLAN NOTE
--Worsening creatinine, now 7.1, with continued hydro on US   --Failed stents   --IR PCN placement today

## 2019-03-18 NOTE — PLAN OF CARE
Problem: Adult Inpatient Plan of Care  Goal: Plan of Care Review  Outcome: Ongoing (interventions implemented as appropriate)  Patient on RA.  No distress. Will continue to monitor.

## 2019-03-18 NOTE — PROGRESS NOTES
Progress Note  Nephrology    Admit Date: 3/11/2019   LOS: 7 days     SUBJECTIVE:     Follow-up For:  ALEIDA    Interval History:   Continued worsening of renal failure noted.  Discussed with team.  Pt seen after Stat U/S.  Still with some nausea and not feeling well.  No CP/SOB.     OBJECTIVE:     Vital Signs (Most Recent)  Temp: 98.1 °F (36.7 °C) (03/18/19 0728)  Pulse: 66 (03/18/19 0728)  Resp: 16 (03/18/19 0728)  BP: (!) 171/78 (03/18/19 0728)  SpO2: 99 % (03/18/19 0728)    Vital Signs Range (Last 24H):  Temp:  [97.8 °F (36.6 °C)-98.4 °F (36.9 °C)]   Pulse:  [58-66]   Resp:  [16-18]   BP: (138-181)/(69-81)   SpO2:  [96 %-100 %]     Review of Systems:   Constitutional: no fever or chills   Respiratory: no cough or shortness of breath   Cardiovascular: no chest pain or palpitations   Gastrointestinal: +nausea   Genitourinary: no hematuria or dysuria   Musculoskeletal: no arthralgias or myalgias   Neurological: no seizures or tremors    Physical Exam:  General appearance: Well developed, well nourished  Eyes:  Conjunctivae/corneas clear. EOMI.  Lungs: Normal respiratory effort,   clear to auscultation bilaterally but few atelectatic sounds.   Heart: Regular rate and rhythm, S1, S2 normal, no murmur, rub or charisse.  Abdomen: Soft; bowel sounds normal; no masses,  no organomegaly  Extremities: No cyanosis or clubbing. No edema.    Skin: Skin color, texture, turgor normal. No rashes or lesions  Neurologic: Normal strength and tone. No focal numbness or weakness       Laboratory:  Recent Labs   Lab 03/17/19  0438 03/17/19 2010 03/18/19  0447   * 129* 129*   K 4.0 4.2 4.1   CL 98 97 100   CO2 24 20* 21*   BUN 43* 49* 50*   CREATININE 5.4* 6.5* 7.1*   CALCIUM 8.5* 8.3* 8.2*   PHOS 4.1 4.4 4.7*     Recent Labs   Lab 03/13/19  0753 03/14/19  0730 03/15/19  0431   WBC 7.95 7.59 6.84   HGB 9.3* 9.1* 10.0*   HCT 27.3* 27.2* 29.6*    221 251        Diagnostic Results:  Labs: Reviewed    ASSESSMENT/PLAN:      1. Initially resolved and now worsening ALEIDA on mild CKD III (baseline Creat 1.3) secondary to bilateral hydro/ROBLEDO/retention/Prostate Ca (N17.9, n18.3, N13.30, C61):  Creat markedly improved following release of hydro/ROBLEDO but then has worsened over last 2 days when arenas removed.  STAT US done this am.  Will alert urology team.  Avoid NSAIDS/Bactrim.  IVF's restarted yesterday but levels worsening.  Renally dose meds, avoid nephrotoxins, and monitor I/O's closely.  2. Postobstructive diuresis (R35.8): see above.  Likely needs arenas replaced.   3. Hyponatremia secondary to obstruction (E87.1):  Awaiting urology re-eval.  Isotonic IVF's.   4. HTN (I12.9):  On BB/CCB. No ACE/ARB at this time.       Thank you for allowing me to participate in the care of this patient.    Will follow up.     Zack Banerjee, BRITNEYP  Nephrology

## 2019-03-18 NOTE — PLAN OF CARE
Problem: Adult Inpatient Plan of Care  Goal: Plan of Care Review  Outcome: Ongoing (interventions implemented as appropriate)  Plan of care reviewed with patient.  IV fluids maintained.  Patient resting after procedure, prn medication given for pain.  Purposeful rounding completed, call bell within reach, no needs at this time.  Will continue to monitor.

## 2019-03-18 NOTE — ASSESSMENT & PLAN NOTE
- Worsening hydronephrosis from prior indicating bladder outlet obstruction  - Elevated PSA consistent with prostate cancer.  Will need outpatient prostate biopsy.  - Cystoscopy 3/12 with bilateral obstructions relieved with stenting, but he has had recurrence and persistent hydronephrosis.  - IR to place bilateral PCN.

## 2019-03-19 LAB
ANION GAP SERPL CALC-SCNC: 10 MMOL/L
BUN SERPL-MCNC: 40 MG/DL
CALCIUM SERPL-MCNC: 9.2 MG/DL
CHLORIDE SERPL-SCNC: 107 MMOL/L
CO2 SERPL-SCNC: 20 MMOL/L
CREAT SERPL-MCNC: 4.8 MG/DL
EST. GFR  (AFRICAN AMERICAN): 13 ML/MIN/1.73 M^2
EST. GFR  (NON AFRICAN AMERICAN): 11 ML/MIN/1.73 M^2
GLUCOSE SERPL-MCNC: 122 MG/DL
MAGNESIUM SERPL-MCNC: 2 MG/DL
PHOSPHATE SERPL-MCNC: 4 MG/DL
POCT GLUCOSE: 115 MG/DL (ref 70–110)
POCT GLUCOSE: 209 MG/DL (ref 70–110)
POCT GLUCOSE: 267 MG/DL (ref 70–110)
POCT GLUCOSE: 305 MG/DL (ref 70–110)
POCT GLUCOSE: 372 MG/DL (ref 70–110)
POTASSIUM SERPL-SCNC: 4.2 MMOL/L
SODIUM SERPL-SCNC: 137 MMOL/L

## 2019-03-19 PROCEDURE — 25000003 PHARM REV CODE 250: Performed by: NURSE PRACTITIONER

## 2019-03-19 PROCEDURE — 97166 OT EVAL MOD COMPLEX 45 MIN: CPT

## 2019-03-19 PROCEDURE — 94761 N-INVAS EAR/PLS OXIMETRY MLT: CPT

## 2019-03-19 PROCEDURE — 25000003 PHARM REV CODE 250: Performed by: INTERNAL MEDICINE

## 2019-03-19 PROCEDURE — 99233 PR SUBSEQUENT HOSPITAL CARE,LEVL III: ICD-10-PCS | Mod: ,,, | Performed by: NURSE PRACTITIONER

## 2019-03-19 PROCEDURE — 83735 ASSAY OF MAGNESIUM: CPT

## 2019-03-19 PROCEDURE — 11000001 HC ACUTE MED/SURG PRIVATE ROOM

## 2019-03-19 PROCEDURE — 99233 SBSQ HOSP IP/OBS HIGH 50: CPT | Mod: ,,, | Performed by: NURSE PRACTITIONER

## 2019-03-19 PROCEDURE — 36415 COLL VENOUS BLD VENIPUNCTURE: CPT

## 2019-03-19 PROCEDURE — 99233 SBSQ HOSP IP/OBS HIGH 50: CPT | Mod: ,,, | Performed by: INTERNAL MEDICINE

## 2019-03-19 PROCEDURE — 25000003 PHARM REV CODE 250: Performed by: HOSPITALIST

## 2019-03-19 PROCEDURE — 99233 PR SUBSEQUENT HOSPITAL CARE,LEVL III: ICD-10-PCS | Mod: ,,, | Performed by: INTERNAL MEDICINE

## 2019-03-19 PROCEDURE — 84100 ASSAY OF PHOSPHORUS: CPT

## 2019-03-19 PROCEDURE — 80048 BASIC METABOLIC PNL TOTAL CA: CPT

## 2019-03-19 RX ORDER — AMLODIPINE BESYLATE 5 MG/1
10 TABLET ORAL DAILY
Status: DISCONTINUED | OUTPATIENT
Start: 2019-03-20 | End: 2019-03-21

## 2019-03-19 RX ORDER — SODIUM CHLORIDE, SODIUM LACTATE, POTASSIUM CHLORIDE, CALCIUM CHLORIDE 600; 310; 30; 20 MG/100ML; MG/100ML; MG/100ML; MG/100ML
INJECTION, SOLUTION INTRAVENOUS CONTINUOUS
Status: DISCONTINUED | OUTPATIENT
Start: 2019-03-19 | End: 2019-03-21

## 2019-03-19 RX ORDER — HYDROCODONE BITARTRATE AND ACETAMINOPHEN 5; 325 MG/1; MG/1
1 TABLET ORAL EVERY 4 HOURS PRN
Status: DISCONTINUED | OUTPATIENT
Start: 2019-03-19 | End: 2019-03-19

## 2019-03-19 RX ORDER — HYDROCODONE BITARTRATE AND ACETAMINOPHEN 7.5; 325 MG/1; MG/1
1 TABLET ORAL EVERY 4 HOURS PRN
Status: DISCONTINUED | OUTPATIENT
Start: 2019-03-19 | End: 2019-03-21 | Stop reason: HOSPADM

## 2019-03-19 RX ORDER — AMLODIPINE BESYLATE 5 MG/1
5 TABLET ORAL ONCE
Status: COMPLETED | OUTPATIENT
Start: 2019-03-19 | End: 2019-03-19

## 2019-03-19 RX ADMIN — AMLODIPINE BESYLATE 5 MG: 5 TABLET ORAL at 08:03

## 2019-03-19 RX ADMIN — SODIUM CHLORIDE, SODIUM LACTATE, POTASSIUM CHLORIDE, AND CALCIUM CHLORIDE: .6; .31; .03; .02 INJECTION, SOLUTION INTRAVENOUS at 09:03

## 2019-03-19 RX ADMIN — HYDROCODONE BITARTRATE AND ACETAMINOPHEN 1 TABLET: 7.5; 325 TABLET ORAL at 09:03

## 2019-03-19 RX ADMIN — HYDROCODONE BITARTRATE AND ACETAMINOPHEN 1 TABLET: 5; 325 TABLET ORAL at 01:03

## 2019-03-19 RX ADMIN — BICALUTAMIDE 50 MG: 50 TABLET, FILM COATED ORAL at 08:03

## 2019-03-19 RX ADMIN — ATENOLOL 50 MG: 25 TABLET ORAL at 08:03

## 2019-03-19 RX ADMIN — AMLODIPINE BESYLATE 5 MG: 5 TABLET ORAL at 05:03

## 2019-03-19 RX ADMIN — SODIUM CHLORIDE: 0.9 INJECTION, SOLUTION INTRAVENOUS at 03:03

## 2019-03-19 RX ADMIN — HYDROCODONE BITARTRATE AND ACETAMINOPHEN 1 TABLET: 5; 325 TABLET ORAL at 08:03

## 2019-03-19 RX ADMIN — HYDROCODONE BITARTRATE AND ACETAMINOPHEN 1 TABLET: 7.5; 325 TABLET ORAL at 05:03

## 2019-03-19 RX ADMIN — HYDRALAZINE HYDROCHLORIDE 25 MG: 25 TABLET, FILM COATED ORAL at 01:03

## 2019-03-19 RX ADMIN — ATORVASTATIN CALCIUM 20 MG: 20 TABLET, FILM COATED ORAL at 08:03

## 2019-03-19 RX ADMIN — STANDARDIZED SENNA CONCENTRATE AND DOCUSATE SODIUM 1 TABLET: 8.6; 5 TABLET, FILM COATED ORAL at 08:03

## 2019-03-19 NOTE — ASSESSMENT & PLAN NOTE
- Worsening hydronephrosis from prior indicating bladder outlet obstruction  - Elevated PSA consistent with prostate cancer.  Will need outpatient prostate biopsy.  - Cystoscopy 3/12 with bilateral obstructions relieved with stenting, but he has had recurrence and persistent hydronephrosis.  - s/p bilateral PCN tube placement by IR

## 2019-03-19 NOTE — NURSING
Plan of care reviewed with pt. Left nephrostomy tube found to have leakage. NP Perry notified. Right tubing bag bright red. Left tubing bag clear yellow. Pt instructed to not stand and let nephrostomy drainage bags hang from incision site. Verbalized understanding. Needs reinforcement. Pt blood pressure high, covered with PRN anti-HTN. Pt resting in bed. Purposeful rounding done, call light within reach, bed in lowest position and locked. Will continue to monitor.

## 2019-03-19 NOTE — SUBJECTIVE & OBJECTIVE
Interval History:   VSS, afebrile  S/p cysto with bilateral stent placement on 3/12 with Dr. Cedeño. Appeared to be related to advanced prostate cancer- started casodex. Creatinine responded well initially (2.5); however then worsened to 7.1 yesterday  Now s/p bilateral PCN placement in IR yesterday   Creatinine now 4.8   Flank pain and nausea resolved   Left PCN with leakage from the tubing overnight, tightened and now dry    Review of Systems   Constitutional: Negative for chills and fever.   Respiratory: Negative for chest tightness and shortness of breath.    Cardiovascular: Negative for chest pain and palpitations.   Gastrointestinal: Negative for abdominal distention, abdominal pain, nausea and vomiting.   Genitourinary: Positive for flank pain and hematuria. Negative for difficulty urinating, dysuria, frequency and urgency.     Objective:     Temp:  [96.7 °F (35.9 °C)-98.8 °F (37.1 °C)] 97.8 °F (36.6 °C)  Pulse:  [54-66] 62  Resp:  [14-18] 18  SpO2:  [97 %-100 %] 100 %  BP: (160-195)/(74-93) 193/88     Body mass index is 25.72 kg/m².    Date 03/19/19 0700 - 03/20/19 0659   Shift 7451-6128 6431-3857 5269-1424 24 Hour Total   INTAKE   Shift Total(mL/kg)       OUTPUT   Urine(mL/kg/hr) 4300   4300   Shift Total(mL/kg) 4300(61.3)   4300(61.3)   Weight (kg) 70.1 70.1 70.1 70.1     Bladder Scan Volume (mL): 79 mL (03/18/19 0620)    Drains     Drain                 Nephrostomy 03/18/19 1650 Right less than 1 day         Nephrostomy 03/18/19 1651 Left less than 1 day                Physical Exam   Constitutional: He is oriented to person, place, and time. He appears well-developed and well-nourished.   HENT:   Head: Normocephalic and atraumatic.   Cardiovascular: Normal rate, regular rhythm and normal heart sounds.    Pulmonary/Chest: Effort normal and breath sounds normal.   Abdominal: Soft. Bowel sounds are normal. He exhibits no distension. There is no tenderness. There is no CVA tenderness.   Bilateral PCNs in  place. Sites CDI. Draining thin, pink urine (no clots noted).    Neurological: He is alert and oriented to person, place, and time.   Skin: Skin is warm and dry.     Psychiatric: He has a normal mood and affect. His behavior is normal.       Significant Labs:    BMP:  Recent Labs   Lab 03/17/19 2010 03/18/19 0447 03/19/19  0456   * 129* 137   K 4.2 4.1 4.2   CL 97 100 107   CO2 20* 21* 20*   BUN 49* 50* 40*   CREATININE 6.5* 7.1* 4.8*   CALCIUM 8.3* 8.2* 9.2       CBC:   Recent Labs   Lab 03/13/19  0753 03/14/19  0730 03/15/19  0431   WBC 7.95 7.59 6.84   HGB 9.3* 9.1* 10.0*   HCT 27.3* 27.2* 29.6*    221 251       CMP:   Recent Labs   Lab 03/17/19  0438 03/17/19 2010 03/18/19 0447 03/19/19  0456    130* 98 122*   * 129* 129* 137   K 4.0 4.2 4.1 4.2   CL 98 97 100 107   CO2 24 20* 21* 20*   BUN 43* 49* 50* 40*   CREATININE 5.4* 6.5* 7.1* 4.8*   CALCIUM 8.5* 8.3* 8.2* 9.2   MG 1.7  --  1.8 2.0     Urine Culture:   Recent Labs   Lab 03/14/19  1157   LABURIN No growth     Urine Studies:   Recent Labs   Lab 03/15/19  2116   COLORU Yellow   APPEARANCEUA Hazy*   PHUR 7.0   SPECGRAV 1.010   PROTEINUA 1+*   GLUCUA 1+*   KETONESU Negative   BILIRUBINUA Negative   OCCULTUA 3+*   NITRITE Negative   UROBILINOGEN Negative   LEUKOCYTESUR 1+*   RBCUA 80*   WBCUA 3   BACTERIA Occasional   HYALINECASTS 0       Significant Imaging:  All pertinent imaging results/findings from the past 24 hours have been reviewed.

## 2019-03-19 NOTE — PROGRESS NOTES
Progress Note  Nephrology    Admit Date: 3/11/2019   LOS: 8 days     SUBJECTIVE:     Follow-up For:  ALEIDA    Interval History:   Events noted.  S/P Francisco PCNT's and now creat improving.  Nausea resolved.  No CP/SOB.  Sitting in chair feeling well this am.      OBJECTIVE:     Vital Signs (Most Recent)  Temp: 96.6 °F (35.9 °C) (03/19/19 0845)  Pulse: 60 (03/19/19 0845)  Resp: 20 (03/19/19 0845)  BP: (!) 170/75 (03/19/19 0845)  SpO2: 100 % (03/19/19 0845)    Vital Signs Range (Last 24H):  Temp:  [96.6 °F (35.9 °C)-98.8 °F (37.1 °C)]   Pulse:  [54-66]   Resp:  [14-20]   BP: (160-195)/(74-93)   SpO2:  [97 %-100 %]     Review of Systems:   Constitutional: no fever or chills   Respiratory: no cough or shortness of breath   Cardiovascular: no chest pain or palpitations   Gastrointestinal: -nausea   Genitourinary: no hematuria or dysuria   Musculoskeletal: no arthralgias or myalgias   Neurological: no seizures or tremors    Physical Exam:  General appearance: Well developed, well nourished  Eyes:  Conjunctivae/corneas clear. EOMI.  Lungs: Normal respiratory effort,   clear to auscultation bilaterally but few atelectatic sounds.   Heart: Regular rate and rhythm, S1, S2 normal, no murmur, rub or charisse.  Abdomen: Soft; bowel sounds normal; no masses,  no organomegaly  Extremities: No cyanosis or clubbing. No edema.    Skin: Skin color, texture, turgor normal. No rashes or lesions  Neurologic: Normal strength and tone. No focal numbness or weakness   Francisco PCNT's noted      Laboratory:  Recent Labs   Lab 03/17/19 2010 03/18/19  0447 03/19/19  0456   * 129* 137   K 4.2 4.1 4.2   CL 97 100 107   CO2 20* 21* 20*   BUN 49* 50* 40*   CREATININE 6.5* 7.1* 4.8*   CALCIUM 8.3* 8.2* 9.2   PHOS 4.4 4.7* 4.0     Recent Labs   Lab 03/13/19  0753 03/14/19  0730 03/15/19  0431   WBC 7.95 7.59 6.84   HGB 9.3* 9.1* 10.0*   HCT 27.3* 27.2* 29.6*    221 251        Diagnostic Results:  Labs: Reviewed    ASSESSMENT/PLAN:     1. Initially  resolved and then worsened ALEIDA on mild CKD III (baseline Creat 1.3) secondary to bilateral hydro/ROBLEDO/retention/Prostate Ca. Now with failed stents.  (N17.9, n18.3, N13.30, C61):  Creat markedly improved following release of hydro/ROBLEDO but then has worsened over weekend when arenas removed.  STAT US done and resulted noted.  Placement of Bilateral PCNT's noted and creat improving.  Avoid NSAIDS/Bactrim.  IVF's restarted.  Renally dose meds, avoid nephrotoxins, and monitor I/O's closely.  2. Hyponatremia secondary to obstruction (E87.1):  Improving after #1.  Continue isotonic fluids.    3. HTN (I12.9):  On BB/CCB. No ACE/ARB at this time.       Thank you for allowing me to participate in the care of this patient.    Will continue to follow for renal needs.       Zack Banerjee, ACNP  Nephrology

## 2019-03-19 NOTE — PROGRESS NOTES
[facility] Hospital Medicine  Progress Note    Patient Name: Elissa Mullins  MRN: [id number]  Patient Class: IP- Inpatient   Admission Date: 3/11/2019  Length of Stay: 8 days  Attending Physician: Bella Grimaldo MD  Primary Care Provider: [facility]        Subjective:     Principal Problem:Bilateral hydronephrosis    HPI:  Mr. Mullins is a 74 year old man who presented with pain in the right flank for one day associated with nausea and vomiting.  He has had urinary frequency but no dysuria, hematuria fever or chills.  His urinary stream has been weaker for the last 2 months.  He presented here with left flank pain in December last year, and a CT done at that time showed severe left-sided and moderate right-sided hydronephrosis with and a soft tissue density thought to be the enlarged prostate protruding  into the bladder lumen.  He had a Garcia placed in the ED with improvement in symptoms and was removed prior to discharge.  He was supposed to follow up with urology but did not.  Repeat CT done this presentation showed worsening bilateral hydronephrosis and enlargement of the prostate.  Labs showed ALEIDA with a creatinine of 4.5.  A Garcia was placed with only 20 mL urine produced.  He was admitted with urology consultation.    Hospital Course:  Patient was admitted with Garcia in placed for bladder outlet obstruction and urology was consulted.  Noted markedly elevated PSA .2, and other findings consistent with advanced prostate cancer, although he has not had a biopsy yet.  Cystoscopy was planned with retrograde pyelogram and possible stent placement with Dr. Cedeño.  On 3/12 he underwent bilateral ureteroscopy, retrograde pyelogram and bilateral ureteral stent placement.  He was returned to the floor with Garcia in place and after relief of the obstruction had a considerable post-obstructive diuresis.  He remained on IV fluids while his fluid balance improved.  Voiding  trial done 3/14.  He continued to diurese until fluid balance was -11 liters.  At that point nephrology was consulted for assistance with fluids.  IV fluids were decreased and the diuresis started to improve, but he became nauseated with vomiting over the weekend 3/16-17, and BUN/creatinine started to increase again.  Bladder scans showed minimal urine in the bladder, and retroperitoneal ultrasound showed persistent moderate right and moderate-severe left hydronephrosis.  IR was consulted to place percutaneous nephrostomy tubes and bilateral tube placement placed on 3/18/19, creatinine started to improve.    Patient started on casodex, bone scan showed Increased tracer accumulation within the T8 and T10 vertebra, right iliac bone, right superior pubic ramus, within the anterior left 4th rib, and overlying the SI joints bilaterally strongly suggestive of osseous metastatic disease.        Interval History: c/o pain at nephrostomy tube site, otherwise doing ok.    Review of Systems   Constitutional: Negative for chills and fever.   HENT: Negative for trouble swallowing.    Respiratory: Negative for cough and shortness of breath.    Cardiovascular: Negative for chest pain and leg swelling.   Gastrointestinal: Negative for abdominal pain, diarrhea and vomiting.   Genitourinary: Negative for dysuria and hematuria.   Musculoskeletal: Positive for back pain.   Skin: Negative for rash.   Neurological: Negative for headaches.   Psychiatric/Behavioral: Negative for confusion.     Objective:     Vital Signs (Most Recent):  Temp: 98.6 °F (37 °C) (03/19/19 1536)  Pulse: 60 (03/19/19 1536)  Resp: 18 (03/19/19 1536)  BP: (!) 170/73 (03/19/19 1536)  SpO2: 99 % (03/19/19 1536) Vital Signs (24h Range):  Temp:  [96.6 °F (35.9 °C)-98.8 °F (37.1 °C)] 98.6 °F (37 °C)  Pulse:  [54-66] 60  Resp:  [16-20] 18  SpO2:  [97 %-100 %] 99 %  BP: (160-193)/(73-88) 170/73     Weight: 70.1 kg (154 lb 8.7 oz)  Body mass index is 25.72  kg/m².    Intake/Output Summary (Last 24 hours) at 3/19/2019 1604  Last data filed at 3/19/2019 1530  Gross per 24 hour   Intake 862.49 ml   Output 8075 ml   Net -7212.51 ml      Physical Exam   Constitutional: He is oriented to person, place, and time. No distress.   HENT:   Head: Normocephalic and atraumatic.   Eyes: EOM are normal. Pupils are equal, round, and reactive to light.   Neck: Normal range of motion. Neck supple.   Cardiovascular: Normal rate and regular rhythm.   Pulmonary/Chest: Effort normal and breath sounds normal. No respiratory distress.   Abdominal: Soft. Bowel sounds are normal. He exhibits no distension. There is no tenderness.   Bilateral nephrostomy tubes   Musculoskeletal: Normal range of motion. He exhibits no edema.   Neurological: He is alert and oriented to person, place, and time. No cranial nerve deficit.   Skin: Skin is warm.   Psychiatric: He has a normal mood and affect.   Vitals reviewed.      Significant Labs:   BMP:   Recent Labs   Lab 03/19/19  0456   *      K 4.2      CO2 20*   BUN 40*   CREATININE 4.8*   CALCIUM 9.2   MG 2.0     CBC: No results for input(s): WBC, HGB, HCT, PLT in the last 48 hours.    Significant Imaging: I have reviewed all pertinent imaging results/findings within the past 24 hours.    Assessment/Plan:      * Bilateral hydronephrosis    - Worsening hydronephrosis from prior indicating bladder outlet obstruction  - Elevated PSA consistent with prostate cancer.  Will need outpatient prostate biopsy.  - Cystoscopy 3/12 with bilateral obstructions relieved with stenting, but he has had recurrence and persistent hydronephrosis.  - s/p bilateral PCN tube placement by IR      Prostate cancer    Outpatient urology follow up  -started on casodex  - bone scan shows metastatic disease  - urology follow up outpatient       Postobstructive diuresis    -improved  - Continue IVF.     Hyperlipidemia    Continue statin       Essential hypertension    -  Continue amlodipine, atenolol  - Hold HCTZ, losartan with ALEIDA  - BP has been up  - increase amlodipine to 10 mg daily       Type 2 diabetes mellitus, without long-term current use of insulin    Metformin held.  Sliding scale insulin.  Sugars ok       ALEIDA (acute kidney injury)    - Obstructive uropathy.  Had good improvement after stenting, but BUN/creatinine now worsening.  Not bladder outlet obstruction.  - Repeat ultrasound shows persistent hydronephrosis  - Improving after pcn placements  - continue ivf and monitor labs  - renal following       VTE Risk Mitigation (From admission, onward)        Ordered     IP VTE HIGH RISK PATIENT  Once      03/11/19 1343     Place ROSEANNA hose  Until discontinued      03/11/19 1343     Place sequential compression device  Until discontinued      03/11/19 1343              Bella Grimaldo MD  Department of Hospital Medicine   [facility]

## 2019-03-19 NOTE — ASSESSMENT & PLAN NOTE
Outpatient urology follow up  -started on casodex  - bone scan shows metastatic disease  - urology follow up outpatient

## 2019-03-19 NOTE — ASSESSMENT & PLAN NOTE
--Cysto with klaus stent placement on 3/12 with Dr. Cedeño. Appears to be related to advanced prostate cancer.     --Creatinine improved initially (2.5); however then increased to 7.1 yesterday. Repeat US with continued bilateral hydro.   --Now s/p bilateral PCN placement in IR yesterday   --Creatinine now 4.8

## 2019-03-19 NOTE — ASSESSMENT & PLAN NOTE
- Continue amlodipine, atenolol  - Hold HCTZ, losartan with ALEIDA  - BP has been up  - increase amlodipine to 10 mg daily

## 2019-03-19 NOTE — PLAN OF CARE
Pt requests a cane.  Has been hospitalized for 8 days and still pending medical clearance for discharge.   New PT/OT orders requested.      Pt states he lives in [place] and has a supportive Taoist family here.    His plan is to go home temporarily and then move to [place] with his daughter in the near future.      CM to continue to follow.       03/14/19 1537   Discharge Assessment   Assessment Type Discharge Planning Reassessment   Do you have any problems affording any of your prescribed medications? No   Discharge Plan A Home with family   DME Needed Upon Discharge  none

## 2019-03-19 NOTE — SUBJECTIVE & OBJECTIVE
Interval History: c/o pain at nephrostomy tube site, otherwise doing ok.    Review of Systems   Constitutional: Negative for chills and fever.   HENT: Negative for trouble swallowing.    Respiratory: Negative for cough and shortness of breath.    Cardiovascular: Negative for chest pain and leg swelling.   Gastrointestinal: Negative for abdominal pain, diarrhea and vomiting.   Genitourinary: Negative for dysuria and hematuria.   Musculoskeletal: Positive for back pain.   Skin: Negative for rash.   Neurological: Negative for headaches.   Psychiatric/Behavioral: Negative for confusion.     Objective:     Vital Signs (Most Recent):  Temp: 98.6 °F (37 °C) (03/19/19 1536)  Pulse: 60 (03/19/19 1536)  Resp: 18 (03/19/19 1536)  BP: (!) 170/73 (03/19/19 1536)  SpO2: 99 % (03/19/19 1536) Vital Signs (24h Range):  Temp:  [96.6 °F (35.9 °C)-98.8 °F (37.1 °C)] 98.6 °F (37 °C)  Pulse:  [54-66] 60  Resp:  [16-20] 18  SpO2:  [97 %-100 %] 99 %  BP: (160-193)/(73-88) 170/73     Weight: 70.1 kg (154 lb 8.7 oz)  Body mass index is 25.72 kg/m².    Intake/Output Summary (Last 24 hours) at 3/19/2019 1604  Last data filed at 3/19/2019 1530  Gross per 24 hour   Intake 862.49 ml   Output 8075 ml   Net -7212.51 ml      Physical Exam   Constitutional: He is oriented to person, place, and time. No distress.   HENT:   Head: Normocephalic and atraumatic.   Eyes: EOM are normal. Pupils are equal, round, and reactive to light.   Neck: Normal range of motion. Neck supple.   Cardiovascular: Normal rate and regular rhythm.   Pulmonary/Chest: Effort normal and breath sounds normal. No respiratory distress.   Abdominal: Soft. Bowel sounds are normal. He exhibits no distension. There is no tenderness.   Bilateral nephrostomy tubes   Musculoskeletal: Normal range of motion. He exhibits no edema.   Neurological: He is alert and oriented to person, place, and time. No cranial nerve deficit.   Skin: Skin is warm.   Psychiatric: He has a normal mood and  affect.   Vitals reviewed.      Significant Labs:   BMP:   Recent Labs   Lab 03/19/19  0456   *      K 4.2      CO2 20*   BUN 40*   CREATININE 4.8*   CALCIUM 9.2   MG 2.0     CBC: No results for input(s): WBC, HGB, HCT, PLT in the last 48 hours.    Significant Imaging: I have reviewed all pertinent imaging results/findings within the past 24 hours.

## 2019-03-19 NOTE — PROGRESS NOTES
"[facility]  Urology  Progress Note    Patient Name: Elissa Mullins  MRN: [id number]  Admission Date: 3/11/2019  Hospital Length of Stay: 8 days  Code Status: Full Code   Attending Provider: Bella Grimaldo MD   Primary Care Physician: [facility]    Subjective:     HPI:  Mr. Mullins is a 73 y.o. male who presented to the ED with right flank pain that began this morning. Creatinine on admit was noted to be severely elevated at 4.5 with stable electrolytes. UA with only trace protein and RBCs. CT scan revealed "worsening bilateral hydronephrosis severe on the left and moderate to severe on the right with hydroureter to the level of the bladder suggesting an acute on chronic component.  There is enlargement of the prostate with a nodular contour superiorly which impresses on the bladder base." Upon chart review, it appears the patient presented with similar symptoms in December 2018; however did not follow up as instructed. At the time he had moderate-severe bilateral hydro and a creatinine level of 1.5. Urology was consulted for evaluation.      He reports an aching right flank pain with radiation to the abdomen that began this morning. He also reports the "feeling of incomplete bladder emptying" and urinary frequency that began months ago. Garcia was placed on admit with only 20 mL of urine output. Denies a slow stream, dysuria, hematuria, and fever/chills. + nausea that began last night; however denies vomiting. Denies a history of recurrent UTIs and nephrolithiasis. Denies a personal/family history of prostate cancer. Denies constipation, last BM yesterday.       Interval History:   VSS, afebrile  S/p cysto with bilateral stent placement on 3/12 with Dr. Cedeño. Appeared to be related to advanced prostate cancer- started casodex. Creatinine responded well initially (2.5); however then worsened to 7.1 yesterday  Now s/p bilateral PCN placement in IR yesterday   Creatinine now " 4.8   Flank pain and nausea resolved   Left PCN with leakage from the tubing overnight, tightened and now dry    Review of Systems   Constitutional: Negative for chills and fever.   Respiratory: Negative for chest tightness and shortness of breath.    Cardiovascular: Negative for chest pain and palpitations.   Gastrointestinal: Negative for abdominal distention, abdominal pain, nausea and vomiting.   Genitourinary: Positive for flank pain and hematuria. Negative for difficulty urinating, dysuria, frequency and urgency.     Objective:     Temp:  [96.7 °F (35.9 °C)-98.8 °F (37.1 °C)] 97.8 °F (36.6 °C)  Pulse:  [54-66] 62  Resp:  [14-18] 18  SpO2:  [97 %-100 %] 100 %  BP: (160-195)/(74-93) 193/88     Body mass index is 25.72 kg/m².    Date 03/19/19 0700 - 03/20/19 0659   Shift 0689-4851 3708-8719 8418-8531 24 Hour Total   INTAKE   Shift Total(mL/kg)       OUTPUT   Urine(mL/kg/hr) 4300   4300   Shift Total(mL/kg) 4300(61.3)   4300(61.3)   Weight (kg) 70.1 70.1 70.1 70.1     Bladder Scan Volume (mL): 79 mL (03/18/19 0620)    Drains     Drain                 Nephrostomy 03/18/19 1650 Right less than 1 day         Nephrostomy 03/18/19 1651 Left less than 1 day                Physical Exam   Constitutional: He is oriented to person, place, and time. He appears well-developed and well-nourished.   HENT:   Head: Normocephalic and atraumatic.   Cardiovascular: Normal rate, regular rhythm and normal heart sounds.    Pulmonary/Chest: Effort normal and breath sounds normal.   Abdominal: Soft. Bowel sounds are normal. He exhibits no distension. There is no tenderness. There is no CVA tenderness.   Bilateral PCNs in place. Sites CDI. Draining thin, pink urine (no clots noted).    Neurological: He is alert and oriented to person, place, and time.   Skin: Skin is warm and dry.     Psychiatric: He has a normal mood and affect. His behavior is normal.       Significant Labs:    BMP:  Recent Labs   Lab 03/17/19 2010 03/18/19  0447  03/19/19  0456   * 129* 137   K 4.2 4.1 4.2   CL 97 100 107   CO2 20* 21* 20*   BUN 49* 50* 40*   CREATININE 6.5* 7.1* 4.8*   CALCIUM 8.3* 8.2* 9.2       CBC:   Recent Labs   Lab 03/13/19  0753 03/14/19  0730 03/15/19  0431   WBC 7.95 7.59 6.84   HGB 9.3* 9.1* 10.0*   HCT 27.3* 27.2* 29.6*    221 251       CMP:   Recent Labs   Lab 03/17/19  0438 03/17/19 2010 03/18/19  0447 03/19/19  0456    130* 98 122*   * 129* 129* 137   K 4.0 4.2 4.1 4.2   CL 98 97 100 107   CO2 24 20* 21* 20*   BUN 43* 49* 50* 40*   CREATININE 5.4* 6.5* 7.1* 4.8*   CALCIUM 8.5* 8.3* 8.2* 9.2   MG 1.7  --  1.8 2.0     Urine Culture:   Recent Labs   Lab 03/14/19  1157   LABURIN No growth     Urine Studies:   Recent Labs   Lab 03/15/19  2116   COLORU Yellow   APPEARANCEUA Hazy*   PHUR 7.0   SPECGRAV 1.010   PROTEINUA 1+*   GLUCUA 1+*   KETONESU Negative   BILIRUBINUA Negative   OCCULTUA 3+*   NITRITE Negative   UROBILINOGEN Negative   LEUKOCYTESUR 1+*   RBCUA 80*   WBCUA 3   BACTERIA Occasional   HYALINECASTS 0       Significant Imaging:  All pertinent imaging results/findings from the past 24 hours have been reviewed.                  Assessment/Plan:     * Bilateral hydronephrosis    --Cysto with klaus stent placement on 3/12 with Dr. Cedeño. Appears to be related to advanced prostate cancer.     --Creatinine improved initially (2.5); however then increased to 7.1 yesterday. Repeat US with continued bilateral hydro.   --Now s/p bilateral PCN placement in IR yesterday   --Creatinine now 4.8          Elevated PSA    --.2  --Continue casodex  --Appointment for TRUS/Bx with Dr. Cedeño on 3/25  --Bone scan today      ALEIDA (acute kidney injury)    --Failed stents, now with bilateral PCNs  --Creatinine improving, now 4.8             VTE Risk Mitigation (From admission, onward)        Ordered     IP VTE HIGH RISK PATIENT  Once      03/11/19 1343     Place ROSEANNA hose  Until discontinued      03/11/19 1343     Place sequential  compression device  Until discontinued      03/11/19 9249          Kerrie Prater NP  Urology  [facility]

## 2019-03-19 NOTE — ASSESSMENT & PLAN NOTE
- Obstructive uropathy.  Had good improvement after stenting, but BUN/creatinine now worsening.  Not bladder outlet obstruction.  - Repeat ultrasound shows persistent hydronephrosis  - Improving after pcn placements  - continue ivf and monitor labs  - renal following

## 2019-03-19 NOTE — PT/OT/SLP PROGRESS
Occupational Therapy      Patient Name:  Elissa Mullins   MRN:  [id number]    Patient not seen today secondary to nursing hold; communicated with nursing staff prior to attempted OT eval.  RN is attempting to get orders for stronger pain meds as Pt. Is c/o pain; requests to return later to coordinate therapy with pain medication.  Will follow-up later as time permits.    Radha Bonilla, OT  3/19/2019

## 2019-03-19 NOTE — NURSING
Nephrostomy tube found to be leaking on left side. Notified Perry POLLOCK. Instructed to wrap with coban until surgeon can fix permanently.

## 2019-03-20 PROBLEM — C79.9 METASTASIS FROM MALIGNANT NEOPLASM OF PROSTATE: Status: ACTIVE | Noted: 2019-03-20

## 2019-03-20 PROBLEM — C61 METASTASIS FROM MALIGNANT NEOPLASM OF PROSTATE: Status: ACTIVE | Noted: 2019-03-20

## 2019-03-20 LAB
ANION GAP SERPL CALC-SCNC: 6 MMOL/L
BASOPHILS # BLD AUTO: 0.01 K/UL
BASOPHILS NFR BLD: 0.2 %
BUN SERPL-MCNC: 26 MG/DL
CALCIUM SERPL-MCNC: 9 MG/DL
CHLORIDE SERPL-SCNC: 109 MMOL/L
CO2 SERPL-SCNC: 23 MMOL/L
CREAT SERPL-MCNC: 2.7 MG/DL
DIFFERENTIAL METHOD: ABNORMAL
EOSINOPHIL # BLD AUTO: 0.1 K/UL
EOSINOPHIL NFR BLD: 2 %
ERYTHROCYTE [DISTWIDTH] IN BLOOD BY AUTOMATED COUNT: 13.6 %
EST. GFR  (AFRICAN AMERICAN): 26 ML/MIN/1.73 M^2
EST. GFR  (NON AFRICAN AMERICAN): 23 ML/MIN/1.73 M^2
GLUCOSE SERPL-MCNC: 113 MG/DL
HCT VFR BLD AUTO: 25.2 %
HGB BLD-MCNC: 8.6 G/DL
LYMPHOCYTES # BLD AUTO: 1.5 K/UL
LYMPHOCYTES NFR BLD: 27.5 %
MAGNESIUM SERPL-MCNC: 1.9 MG/DL
MCH RBC QN AUTO: 31.3 PG
MCHC RBC AUTO-ENTMCNC: 34.1 G/DL
MCV RBC AUTO: 92 FL
MONOCYTES # BLD AUTO: 1.1 K/UL
MONOCYTES NFR BLD: 20.2 %
NEUTROPHILS # BLD AUTO: 2.8 K/UL
NEUTROPHILS NFR BLD: 49.9 %
PHOSPHATE SERPL-MCNC: 3.6 MG/DL
PLATELET # BLD AUTO: 271 K/UL
PMV BLD AUTO: 10 FL
POCT GLUCOSE: 127 MG/DL (ref 70–110)
POCT GLUCOSE: 135 MG/DL (ref 70–110)
POCT GLUCOSE: 153 MG/DL (ref 70–110)
POCT GLUCOSE: 239 MG/DL (ref 70–110)
POTASSIUM SERPL-SCNC: 4.2 MMOL/L
RBC # BLD AUTO: 2.75 M/UL
SODIUM SERPL-SCNC: 138 MMOL/L
WBC # BLD AUTO: 5.6 K/UL

## 2019-03-20 PROCEDURE — 94761 N-INVAS EAR/PLS OXIMETRY MLT: CPT

## 2019-03-20 PROCEDURE — 25000003 PHARM REV CODE 250: Performed by: NURSE PRACTITIONER

## 2019-03-20 PROCEDURE — 25000003 PHARM REV CODE 250: Performed by: INTERNAL MEDICINE

## 2019-03-20 PROCEDURE — 36415 COLL VENOUS BLD VENIPUNCTURE: CPT

## 2019-03-20 PROCEDURE — 80048 BASIC METABOLIC PNL TOTAL CA: CPT

## 2019-03-20 PROCEDURE — 63600175 PHARM REV CODE 636 W HCPCS: Performed by: INTERNAL MEDICINE

## 2019-03-20 PROCEDURE — 25000003 PHARM REV CODE 250: Performed by: HOSPITALIST

## 2019-03-20 PROCEDURE — 99232 PR SUBSEQUENT HOSPITAL CARE,LEVL II: ICD-10-PCS | Mod: ,,, | Performed by: UROLOGY

## 2019-03-20 PROCEDURE — 83735 ASSAY OF MAGNESIUM: CPT

## 2019-03-20 PROCEDURE — 85025 COMPLETE CBC W/AUTO DIFF WBC: CPT

## 2019-03-20 PROCEDURE — 99233 PR SUBSEQUENT HOSPITAL CARE,LEVL III: ICD-10-PCS | Mod: ,,, | Performed by: INTERNAL MEDICINE

## 2019-03-20 PROCEDURE — 11000001 HC ACUTE MED/SURG PRIVATE ROOM

## 2019-03-20 PROCEDURE — 84100 ASSAY OF PHOSPHORUS: CPT

## 2019-03-20 PROCEDURE — 97116 GAIT TRAINING THERAPY: CPT

## 2019-03-20 PROCEDURE — 99232 SBSQ HOSP IP/OBS MODERATE 35: CPT | Mod: ,,, | Performed by: UROLOGY

## 2019-03-20 PROCEDURE — 99233 SBSQ HOSP IP/OBS HIGH 50: CPT | Mod: ,,, | Performed by: INTERNAL MEDICINE

## 2019-03-20 PROCEDURE — 97162 PT EVAL MOD COMPLEX 30 MIN: CPT

## 2019-03-20 RX ORDER — OXYCODONE HYDROCHLORIDE 5 MG/1
5 TABLET ORAL
Status: DISCONTINUED | OUTPATIENT
Start: 2019-03-20 | End: 2019-03-21 | Stop reason: HOSPADM

## 2019-03-20 RX ORDER — HEPARIN SODIUM 5000 [USP'U]/ML
5000 INJECTION, SOLUTION INTRAVENOUS; SUBCUTANEOUS EVERY 12 HOURS
Status: DISCONTINUED | OUTPATIENT
Start: 2019-03-20 | End: 2019-03-21 | Stop reason: HOSPADM

## 2019-03-20 RX ORDER — MEPERIDINE HYDROCHLORIDE 25 MG/ML
12.5 INJECTION INTRAMUSCULAR; INTRAVENOUS; SUBCUTANEOUS ONCE AS NEEDED
Status: DISCONTINUED | OUTPATIENT
Start: 2019-03-20 | End: 2019-03-21 | Stop reason: HOSPADM

## 2019-03-20 RX ORDER — FENTANYL CITRATE 50 UG/ML
25 INJECTION, SOLUTION INTRAMUSCULAR; INTRAVENOUS EVERY 5 MIN PRN
Status: DISCONTINUED | OUTPATIENT
Start: 2019-03-20 | End: 2019-03-21 | Stop reason: HOSPADM

## 2019-03-20 RX ORDER — ONDANSETRON 2 MG/ML
4 INJECTION INTRAMUSCULAR; INTRAVENOUS DAILY PRN
Status: DISCONTINUED | OUTPATIENT
Start: 2019-03-20 | End: 2019-03-21 | Stop reason: HOSPADM

## 2019-03-20 RX ORDER — HYDROMORPHONE HYDROCHLORIDE 2 MG/ML
0.4 INJECTION, SOLUTION INTRAMUSCULAR; INTRAVENOUS; SUBCUTANEOUS EVERY 5 MIN PRN
Status: DISCONTINUED | OUTPATIENT
Start: 2019-03-20 | End: 2019-03-21 | Stop reason: HOSPADM

## 2019-03-20 RX ORDER — HYDRALAZINE HYDROCHLORIDE 25 MG/1
25 TABLET, FILM COATED ORAL EVERY 8 HOURS
Status: DISCONTINUED | OUTPATIENT
Start: 2019-03-20 | End: 2019-03-21

## 2019-03-20 RX ADMIN — ATORVASTATIN CALCIUM 20 MG: 20 TABLET, FILM COATED ORAL at 09:03

## 2019-03-20 RX ADMIN — INSULIN ASPART 2 UNITS: 100 INJECTION, SOLUTION INTRAVENOUS; SUBCUTANEOUS at 05:03

## 2019-03-20 RX ADMIN — HYDRALAZINE HYDROCHLORIDE 25 MG: 25 TABLET, FILM COATED ORAL at 09:03

## 2019-03-20 RX ADMIN — HYDROCODONE BITARTRATE AND ACETAMINOPHEN 1 TABLET: 7.5; 325 TABLET ORAL at 01:03

## 2019-03-20 RX ADMIN — HYDROCODONE BITARTRATE AND ACETAMINOPHEN 1 TABLET: 7.5; 325 TABLET ORAL at 09:03

## 2019-03-20 RX ADMIN — STANDARDIZED SENNA CONCENTRATE AND DOCUSATE SODIUM 1 TABLET: 8.6; 5 TABLET, FILM COATED ORAL at 09:03

## 2019-03-20 RX ADMIN — HYDROCODONE BITARTRATE AND ACETAMINOPHEN 1 TABLET: 7.5; 325 TABLET ORAL at 05:03

## 2019-03-20 RX ADMIN — HYDRALAZINE HYDROCHLORIDE 25 MG: 25 TABLET, FILM COATED ORAL at 03:03

## 2019-03-20 RX ADMIN — BICALUTAMIDE 50 MG: 50 TABLET, FILM COATED ORAL at 09:03

## 2019-03-20 RX ADMIN — AMLODIPINE BESYLATE 10 MG: 5 TABLET ORAL at 09:03

## 2019-03-20 RX ADMIN — SODIUM CHLORIDE, SODIUM LACTATE, POTASSIUM CHLORIDE, AND CALCIUM CHLORIDE: .6; .31; .03; .02 INJECTION, SOLUTION INTRAVENOUS at 07:03

## 2019-03-20 RX ADMIN — HEPARIN SODIUM 5000 UNITS: 5000 INJECTION, SOLUTION INTRAVENOUS; SUBCUTANEOUS at 08:03

## 2019-03-20 RX ADMIN — ATENOLOL 50 MG: 25 TABLET ORAL at 09:03

## 2019-03-20 RX ADMIN — SODIUM CHLORIDE, SODIUM LACTATE, POTASSIUM CHLORIDE, AND CALCIUM CHLORIDE: .6; .31; .03; .02 INJECTION, SOLUTION INTRAVENOUS at 08:03

## 2019-03-20 NOTE — PLAN OF CARE
Problem: Physical Therapy Goal  Goal: Physical Therapy Goal  Outcome: Outcome(s) achieved Date Met: 03/20/19  Patient evaluated and no acute care PT goals identified. Patient gait trained with single point cane and ambulated x300 ft with Karel and ascended/descended 2 stairs with no HR and use of cane with SBA. Encouraged patient to ambulate at least 2x per day with staff assistance for line management and safety to maintain strength. Patient reports having family assistance at home to enter/exit 2nd floor apartment and expresses no concerns upon d/c. Recommendation for d/c to home with HH PT.

## 2019-03-20 NOTE — ASSESSMENT & PLAN NOTE
Outpatient urology follow up  -started on casodex  - bone scan shows metastatic disease  - urology follow up outpatient, scheduled for prostate biopsy on 3//25

## 2019-03-20 NOTE — ASSESSMENT & PLAN NOTE
--Cysto with klaus stent placement on 3/12 with Dr. Cedeño. Appears to be related to advanced prostate cancer.     --Creatinine improved initially with stents, now failed. Now PCN in place  --Now s/p bilateral PCN placement in IR 3/19/19  --Creatinine improving

## 2019-03-20 NOTE — SUBJECTIVE & OBJECTIVE
Interval History: PCN draining well. Pt tolerating well.     Review of Systems   Constitutional: Negative for chills and fever.   HENT: Negative for hearing loss, sore throat and trouble swallowing.    Eyes: Negative.    Respiratory: Negative for cough and shortness of breath.    Cardiovascular: Negative for chest pain.   Gastrointestinal: Negative for abdominal distention, abdominal pain, constipation, diarrhea, nausea and vomiting.   Genitourinary: Positive for hematuria. Negative for difficulty urinating and frequency.   Musculoskeletal: Negative for arthralgias and neck pain.   Skin: Negative for color change, pallor and rash.   Neurological: Negative for dizziness and seizures.   Hematological: Negative for adenopathy.   Psychiatric/Behavioral: Negative for confusion.   All other systems reviewed and are negative.    Objective:     Temp:  [97 °F (36.1 °C)-98.6 °F (37 °C)] 98 °F (36.7 °C)  Pulse:  [56-70] 63  Resp:  [18-20] 18  SpO2:  [97 %-100 %] 99 %  BP: (152-177)/(73-81) 164/74     Body mass index is 25.72 kg/m².       Bladder Scan Volume (mL): 79 mL (03/18/19 0620)    Drains     Drain                 Nephrostomy 03/18/19 1650 Right 1 day         Nephrostomy 03/18/19 1651 Left 1 day                Physical Exam   Vitals reviewed.  Constitutional: He is oriented to person, place, and time. He appears well-developed and well-nourished. No distress.   HENT:   Head: Normocephalic and atraumatic.   Eyes: Right eye exhibits no discharge. Left eye exhibits no discharge. No scleral icterus.   Neck: Normal range of motion. Neck supple.   Cardiovascular: Normal rate and regular rhythm.    Pulmonary/Chest: Effort normal and breath sounds normal. No respiratory distress.   Abdominal: Soft. Bowel sounds are normal. He exhibits no distension. There is no tenderness. There is no rebound and no guarding.   Genitourinary:   Genitourinary Comments: B/l PCN in place, light pink urine   Musculoskeletal: Normal range of motion.    Neurological: He is alert and oriented to person, place, and time.   Skin: Skin is warm and dry. He is not diaphoretic. No erythema.         Significant Labs:    BMP:  Recent Labs   Lab 03/18/19  0447 03/19/19  0456 03/20/19  0515   * 137 138   K 4.1 4.2 4.2    107 109   CO2 21* 20* 23   BUN 50* 40* 26*   CREATININE 7.1* 4.8* 2.7*   CALCIUM 8.2* 9.2 9.0       CBC:   Recent Labs   Lab 03/14/19  0730 03/15/19  0431 03/20/19  0515   WBC 7.59 6.84 5.60   HGB 9.1* 10.0* 8.6*   HCT 27.2* 29.6* 25.2*    251 271       Blood Culture: No results for input(s): LABBLOO in the last 168 hours.  Urine Culture:   Recent Labs   Lab 03/14/19  1157   LABURIN No growth     Urine Studies:   Recent Labs   Lab 03/15/19  2116   COLORU Yellow   APPEARANCEUA Hazy*   PHUR 7.0   SPECGRAV 1.010   PROTEINUA 1+*   GLUCUA 1+*   KETONESU Negative   BILIRUBINUA Negative   OCCULTUA 3+*   NITRITE Negative   UROBILINOGEN Negative   LEUKOCYTESUR 1+*   RBCUA 80*   WBCUA 3   BACTERIA Occasional   HYALINECASTS 0       Significant Imaging:  All pertinent imaging results/findings from the past 24 hours have been reviewed.

## 2019-03-20 NOTE — PLAN OF CARE
Problem: Fall Injury Risk  Goal: Absence of Fall and Fall-Related Injury  Outcome: Outcome(s) achieved Date Met: 03/20/19  No significant events this shift. Pt in bed. BP required no antihypertensives. Pain in back relieved by oral PRN medications. Nephrostomy incisions clean dry and intact. Call light in reach, bed in lowest position and locked. Safety rounds completed. Will continue to monitor.  Intervention: Identify and Manage Contributors to Fall Injury Risk  No significant events this shift. Pt in bed. BP required no antihypertensives. Pain in back relieved by oral PRN medications. Nephrostomy incisions clean dry and intact. Call light in reach, bed in lowest position and locked. Safety rounds completed. Will continue to monitor.  Intervention: Promote Injury-Free Environment  No significant events this shift. Pt in bed. BP required no antihypertensives. Pain in back relieved by oral PRN medications. Nephrostomy incisions clean dry and intact. Call light in reach, bed in lowest position and locked. Safety rounds completed. Will continue to monitor.      Problem: Infection  Goal: Infection Symptom Resolution  Outcome: Ongoing (interventions implemented as appropriate)  No significant events this shift. Pt in bed. BP required no antihypertensives. Pain in back relieved by oral PRN medications. Nephrostomy incisions clean dry and intact. Call light in reach, bed in lowest position and locked. Safety rounds completed. Will continue to monitor.  Intervention: Prevent or Manage Infection  No significant events this shift. Pt in bed. BP required no antihypertensives. Pain in back relieved by oral PRN medications. Nephrostomy incisions clean dry and intact. Call light in reach, bed in lowest position and locked. Safety rounds completed. Will continue to monitor.      Problem: Adult Inpatient Plan of Care  Goal: Plan of Care Review  No significant events this shift. Pt in bed. BP required no antihypertensives. Pain in  back relieved by oral PRN medications. Nephrostomy incisions clean dry and intact. Call light in reach, bed in lowest position and locked. Safety rounds completed. Will continue to monitor.  Goal: Patient-Specific Goal (Individualization)  No significant events this shift. Pt in bed. BP required no antihypertensives. Pain in back relieved by oral PRN medications. Nephrostomy incisions clean dry and intact. Call light in reach, bed in lowest position and locked. Safety rounds completed. Will continue to monitor.  Goal: Absence of Hospital-Acquired Illness or Injury  Outcome: Ongoing (interventions implemented as appropriate)  No significant events this shift. Pt in bed. BP required no antihypertensives. Pain in back relieved by oral PRN medications. Nephrostomy incisions clean dry and intact. Call light in reach, bed in lowest position and locked. Safety rounds completed. Will continue to monitor.  Intervention: Identify and Manage Fall Risk  No significant events this shift. Pt in bed. BP required no antihypertensives. Pain in back relieved by oral PRN medications. Nephrostomy incisions clean dry and intact. Call light in reach, bed in lowest position and locked. Safety rounds completed. Will continue to monitor.  Intervention: Prevent Skin Injury  No significant events this shift. Pt in bed. BP required no antihypertensives. Pain in back relieved by oral PRN medications. Nephrostomy incisions clean dry and intact. Call light in reach, bed in lowest position and locked. Safety rounds completed. Will continue to monitor.  Intervention: Prevent VTE (venous thromboembolism)  No significant events this shift. Pt in bed. BP required no antihypertensives. Pain in back relieved by oral PRN medications. Nephrostomy incisions clean dry and intact. Call light in reach, bed in lowest position and locked. Safety rounds completed. Will continue to monitor.  Intervention: Prevent Infection  No significant events this shift. Pt in  bed. BP required no antihypertensives. Pain in back relieved by oral PRN medications. Nephrostomy incisions clean dry and intact. Call light in reach, bed in lowest position and locked. Safety rounds completed. Will continue to monitor.    Goal: Optimal Comfort and Wellbeing  Outcome: Ongoing (interventions implemented as appropriate)  No significant events this shift. Pt in bed. BP required no antihypertensives. Pain in back relieved by oral PRN medications. Nephrostomy incisions clean dry and intact. Call light in reach, bed in lowest position and locked. Safety rounds completed. Will continue to monitor.  Intervention: Monitor Pain and Promote Comfort  No significant events this shift. Pt in bed. BP required no antihypertensives. Pain in back relieved by oral PRN medications. Nephrostomy incisions clean dry and intact. Call light in reach, bed in lowest position and locked. Safety rounds completed. Will continue to monitor.  Intervention: Provide Person-Centered Care  No significant events this shift. Pt in bed. BP required no antihypertensives. Pain in back relieved by oral PRN medications. Nephrostomy incisions clean dry and intact. Call light in reach, bed in lowest position and locked. Safety rounds completed. Will continue to monitor.    Goal: Readiness for Transition of Care  Outcome: Ongoing (interventions implemented as appropriate)  No significant events this shift. Pt in bed. BP required no antihypertensives. Pain in back relieved by oral PRN medications. Nephrostomy incisions clean dry and intact. Call light in reach, bed in lowest position and locked. Safety rounds completed. Will continue to monitor.  Intervention: Mutually Develop Transition Plan  No significant events this shift. Pt in bed. BP required no antihypertensives. Pain in back relieved by oral PRN medications. Nephrostomy incisions clean dry and intact. Call light in reach, bed in lowest position and locked. Safety rounds completed. Will  continue to monitor.    Goal: Rounds/Family Conference  Outcome: Ongoing (interventions implemented as appropriate)  No significant events this shift. Pt in bed. BP required no antihypertensives. Pain in back relieved by oral PRN medications. Nephrostomy incisions clean dry and intact. Call light in reach, bed in lowest position and locked. Safety rounds completed. Will continue to monitor.    Problem: Diabetes Comorbidity  Goal: Blood Glucose Level Within Desired Range  Outcome: Ongoing (interventions implemented as appropriate)  No significant events this shift. Pt in bed. BP required no antihypertensives. Pain in back relieved by oral PRN medications. Nephrostomy incisions clean dry and intact. Call light in reach, bed in lowest position and locked. Safety rounds completed. Will continue to monitor.  Intervention: Maintain Glycemic Control  No significant events this shift. Pt in bed. BP required no antihypertensives. Pain in back relieved by oral PRN medications. Nephrostomy incisions clean dry and intact. Call light in reach, bed in lowest position and locked. Safety rounds completed. Will continue to monitor.      Comments: No significant events this shift. Pt in bed. BP required no antihypertensives. Pain in back relieved by oral PRN medications. Nephrostomy incisions clean dry and intact. Call light in reach, bed in lowest position and locked. Safety rounds completed. Will continue to monitor.

## 2019-03-20 NOTE — PLAN OF CARE
Problem: Occupational Therapy Goal  Goal: Occupational Therapy Goal  Outcome: Outcome(s) achieved Date Met: 03/19/19  Initial OT eval complete.  Needs 3-in-1 commode; will defer AD needs to PT.  Recommend participation with nursing staff for ADL tasks.  Will have assist from family as needed.  To d/c OT services.

## 2019-03-20 NOTE — ASSESSMENT & PLAN NOTE
- Continue amlodipine, atenolol  - Hold HCTZ, losartan with ALEIDA  - BP has been up  - increased amlodipine to 10 mg daily  - still on higher side , will start hydralazine 25 tid

## 2019-03-20 NOTE — PROGRESS NOTES
[facility] Hospital Medicine  Progress Note    Patient Name: Elissa Mullins  MRN: [id number]  Patient Class: IP- Inpatient   Admission Date: 3/11/2019  Length of Stay: 9 days  Attending Physician: Bella Grimaldo MD  Primary Care Provider: [facility]        Subjective:     Principal Problem:Bilateral hydronephrosis    HPI:  Mr. Mullins is a 74 year old man who presented with pain in the right flank for one day associated with nausea and vomiting.  He has had urinary frequency but no dysuria, hematuria fever or chills.  His urinary stream has been weaker for the last 2 months.  He presented here with left flank pain in December last year, and a CT done at that time showed severe left-sided and moderate right-sided hydronephrosis with and a soft tissue density thought to be the enlarged prostate protruding  into the bladder lumen.  He had a Garcia placed in the ED with improvement in symptoms and was removed prior to discharge.  He was supposed to follow up with urology but did not.  Repeat CT done this presentation showed worsening bilateral hydronephrosis and enlargement of the prostate.  Labs showed ALEIDA with a creatinine of 4.5.  A Garcia was placed with only 20 mL urine produced.  He was admitted with urology consultation.    Hospital Course:  Patient was admitted with Garcia in placed for bladder outlet obstruction and urology was consulted.  Noted markedly elevated PSA .2, and other findings consistent with advanced prostate cancer, although he has not had a biopsy yet.  Cystoscopy was planned with retrograde pyelogram and possible stent placement with Dr. Cedeño.  On 3/12 he underwent bilateral ureteroscopy, retrograde pyelogram and bilateral ureteral stent placement.  He was returned to the floor with Garcia in place and after relief of the obstruction had a considerable post-obstructive diuresis.  He remained on IV fluids while his fluid balance improved.  Voiding  trial done 3/14.  He continued to diurese until fluid balance was -11 liters.  At that point nephrology was consulted for assistance with fluids.  IV fluids were decreased and the diuresis started to improve, but he became nauseated with vomiting over the weekend 3/16-17, and BUN/creatinine started to increase again.  Bladder scans showed minimal urine in the bladder, and retroperitoneal ultrasound showed persistent moderate right and moderate-severe left hydronephrosis.  IR was consulted to place percutaneous nephrostomy tubes and bilateral tube placement placed on 3/18/19, creatinine started to improve.    Patient started on casodex, bone scan showed Increased tracer accumulation within the T8 and T10 vertebra, right iliac bone, right superior pubic ramus, within the anterior left 4th rib, and overlying the SI joints bilaterally strongly suggestive of osseous metastatic disease.        Interval History: improved  pain at nephrostomy tube site,  doing ok, diuresing a lot.    Review of Systems   Constitutional: Negative for chills and fever.   HENT: Negative for trouble swallowing.    Respiratory: Negative for cough and shortness of breath.    Cardiovascular: Negative for chest pain and leg swelling.   Gastrointestinal: Negative for abdominal pain, diarrhea and vomiting.   Genitourinary: Negative for dysuria and hematuria.   Musculoskeletal: Positive for back pain.   Skin: Negative for rash.   Neurological: Negative for headaches.   Psychiatric/Behavioral: Negative for confusion.     Objective:     Vital Signs (Most Recent):  Temp: 98.2 °F (36.8 °C) (03/20/19 1239)  Pulse: (!) 57 (03/20/19 1239)  Resp: 18 (03/20/19 1239)  BP: (!) 177/83 (03/20/19 1239)  SpO2: 100 % (03/20/19 1239) Vital Signs (24h Range):  Temp:  [97 °F (36.1 °C)-98.6 °F (37 °C)] 98.2 °F (36.8 °C)  Pulse:  [56-70] 57  Resp:  [18-20] 18  SpO2:  [97 %-100 %] 100 %  BP: (152-177)/(73-83) 177/83     Weight: 70.1 kg (154 lb 8.7 oz)  Body mass index is  25.72 kg/m².    Intake/Output Summary (Last 24 hours) at 3/20/2019 1408  Last data filed at 3/20/2019 0600  Gross per 24 hour   Intake 250 ml   Output 2175 ml   Net -1925 ml      Physical Exam   Constitutional: He is oriented to person, place, and time. No distress.   HENT:   Head: Normocephalic and atraumatic.   Eyes: EOM are normal. Pupils are equal, round, and reactive to light.   Neck: Normal range of motion. Neck supple.   Cardiovascular: Normal rate and regular rhythm.   Pulmonary/Chest: Effort normal and breath sounds normal. No respiratory distress.   Abdominal: Soft. Bowel sounds are normal. He exhibits no distension. There is no tenderness.   Bilateral nephrostomy tubes   Musculoskeletal: Normal range of motion. He exhibits no edema.   Neurological: He is alert and oriented to person, place, and time. No cranial nerve deficit.   Skin: Skin is warm.   Psychiatric: He has a normal mood and affect.   Vitals reviewed.      Significant Labs:   BMP:   Recent Labs   Lab 03/20/19  0515   *      K 4.2      CO2 23   BUN 26*   CREATININE 2.7*   CALCIUM 9.0   MG 1.9     CBC:   Recent Labs   Lab 03/20/19  0515   WBC 5.60   HGB 8.6*   HCT 25.2*          Significant Imaging: I have reviewed all pertinent imaging results/findings within the past 24 hours.    Assessment/Plan:      * Bilateral hydronephrosis    - Worsening hydronephrosis from prior indicating bladder outlet obstruction  - Elevated PSA consistent with prostate cancer.  Will need outpatient prostate biopsy.  - Cystoscopy 3/12 with bilateral obstructions relieved with stenting, but he has had recurrence and persistent hydronephrosis.  - s/p bilateral PCN tube placement by IR with good urine output     Metastasis from malignant neoplasm of prostate    As above       Prostate cancer    Outpatient urology follow up  -started on casodex  - bone scan shows metastatic disease  - urology follow up outpatient, scheduled for prostate biopsy on  3//25       Postobstructive diuresis    - having good bit of diuresis   - Continue IVF.     Hyperlipidemia    Continue statin       Essential hypertension    - Continue amlodipine, atenolol  - Hold HCTZ, losartan with ALEIDA  - BP has been up  - increased amlodipine to 10 mg daily  - still on higher side , will start hydralazine 25 tid     Type 2 diabetes mellitus, without long-term current use of insulin    Metformin held.  Sliding scale insulin.  Sugars were high yesterday but better today  Lab Results   Component Value Date    HGBA1C 6.6 (H) 03/11/2019            ALEIDA (acute kidney injury)    - Obstructive uropathy.  Had good improvement after stenting, but BUN/creatinine now worsening.  Not bladder outlet obstruction.  - Repeat ultrasound shows persistent hydronephrosis  - Improving after pcn placements  - continue ivf and monitor labs  - renal following       VTE Risk Mitigation (From admission, onward)        Ordered     heparin (porcine) injection 5,000 Units  Every 12 hours      03/20/19 1407     IP VTE HIGH RISK PATIENT  Once      03/11/19 1343     Place ROSEANNA hose  Until discontinued      03/11/19 1343     Place sequential compression device  Until discontinued      03/11/19 1343              Bella Grimaldo MD  Department of Hospital Medicine   [facility]

## 2019-03-20 NOTE — PROGRESS NOTES
Progress Note  Nephrology    Admit Date: 3/11/2019   LOS: 9 days     SUBJECTIVE:     Follow-up For:  ALEIDA    Interval History:   S/P Francisco PCNT's and now creat improving.  No CP/SOB.  UOP noted.      OBJECTIVE:     Vital Signs (Most Recent)  Temp: 98 °F (36.7 °C) (03/20/19 0833)  Pulse: 63 (03/20/19 0833)  Resp: 18 (03/20/19 0833)  BP: (!) 164/74 (03/20/19 0833)  SpO2: 99 % (03/20/19 0833)    Vital Signs Range (Last 24H):  Temp:  [97 °F (36.1 °C)-98.6 °F (37 °C)]   Pulse:  [56-70]   Resp:  [18-20]   BP: (152-177)/(73-81)   SpO2:  [97 %-100 %]       Intake/Output Summary (Last 24 hours) at 3/20/2019 0911  Last data filed at 3/20/2019 0600  Gross per 24 hour   Intake 750 ml   Output 3125 ml   Net -2375 ml     Review of Systems:   Constitutional: no fever or chills   Respiratory: no cough or shortness of breath   Cardiovascular: no chest pain or palpitations   Gastrointestinal: -nausea   Genitourinary: no hematuria or dysuria   Musculoskeletal: no arthralgias or myalgias   Neurological: no seizures or tremors    Physical Exam:  General appearance: Well developed, well nourished  Eyes:  Conjunctivae/corneas clear. EOMI.  Lungs: Normal respiratory effort,   clear to auscultation bilaterally but few atelectatic sounds.   Heart: Regular rate and rhythm, S1, S2 normal, no murmur, rub or charisse.  Abdomen: Soft; bowel sounds normal; no masses,  no organomegaly  Extremities: No cyanosis or clubbing. No edema.    Skin: Skin color, texture, turgor normal. No rashes or lesions  Neurologic: Normal strength and tone. No focal numbness or weakness   Francisco PCNT's noted with large volume urine outpt      Laboratory:  Recent Labs   Lab 03/18/19  0447 03/19/19  0456 03/20/19  0515   * 137 138   K 4.1 4.2 4.2    107 109   CO2 21* 20* 23   BUN 50* 40* 26*   CREATININE 7.1* 4.8* 2.7*   CALCIUM 8.2* 9.2 9.0   PHOS 4.7* 4.0 3.6     Recent Labs   Lab 03/14/19  0730 03/15/19  0431 03/20/19  0515   WBC 7.59 6.84 5.60   HGB 9.1* 10.0*  8.6*   HCT 27.2* 29.6* 25.2*    251 271        Diagnostic Results:  Labs: Reviewed   IR Nephrostomy Tube Placement   Final Result      See IR NEPHROSTOMY TUBE PLACEMENT study report of same date for interpretation.  Accession number [id number]         Electronically signed by: George Mckenzie MD   Date:    03/19/2019   Time:    16:02      NM Bone Scan Whole Body   Final Result      Increased tracer accumulation within the T8 and T10 vertebra, right iliac bone, right superior pubic ramus, within the anterior left 4th rib, and overlying the SI joints bilaterally strongly suggestive of osseous metastatic disease.         Electronically signed by: Quang Sears MD   Date:    03/19/2019   Time:    15:34      IR Nephrostomy Tube Placement   Final Result      Successful bilateral nephrostomy tube placement.      Plan:      Continued care per primary team.      _______________________________________________________________      PROCEDURE SUMMARY      - Image-guided placement of genitourinary catheter(s)      - Additional procedures: None         Electronically signed by: George Mckenzie MD   Date:    03/18/2019   Time:    16:42      US Retroperitoneal Complete (Kidney and   Final Result      Ureteral stents are identified in the bilateral renal pelvises, but there is persistent moderate right and moderate-severe left hydronephrosis.      Elevated resistive indices bilaterally, possibly due to the hydronephrosis.         Electronically signed by: George Mckenzie MD   Date:    03/18/2019   Time:    09:29      SURG FL Surgery Fluoro Usage   Final Result   See OP Notes for results.                   This procedure was auto-finalized by: Virtual Radiologist            CT Renal Stone Study ABD Pelvis WO   Final Result   Abnormal      Worsening bilateral hydronephrosis severe on the left and moderate to severe on the right with hydroureter to the level of the bladder suggesting an acute on chronic component.  There is  enlargement of the prostate with a nodular contour superiorly which impresses on the bladder base.  This is likely relates to the prostate rather than a true bladder mass although follow-up with urology for direct visualization is recommended.      This report was flagged in Epic as abnormal.         Electronically signed by: Tuan Pittman MD   Date:    03/11/2019   Time:    11:15          ASSESSMENT/PLAN:     1. Initially resolved and then worsened ALEIDA on mild CKD III (baseline Creat 1.3) secondary to bilateral hydro/ROBLEDO/retention/Prostate Ca. Now with failed stents.  (N17.9, n18.3, N13.30, C61):  Creat markedly improved following release of hydro/ROBLEDO but then has worsened over weekend when arenas removed.  STAT US done and resulted noted.  Placement of Bilateral PCNT's noted and creat improving.  Avoid NSAIDS/Bactrim.  IVF's restarted as now having post obstructive diuresis.  Monitor lytes and must have accurate I/O's .  Renally dose meds, avoid nephrotoxins, and monitor I/O's closely.  2. Hyponatremia secondary to obstruction (E87.1):  resolved after #1.  Continue isotonic fluids.    3. HTN (I12.9):  On BB/CCB. No ACE/ARB at this time.   4. Metastatic Prostate CA (C79.9, C61):  Defer to oncology.        Thank you for allowing me to participate in the care of this patient.    Will continue to follow for renal needs.       Zack Banerjee, ACNP  Nephrology

## 2019-03-20 NOTE — SUBJECTIVE & OBJECTIVE
Interval History: improved  pain at nephrostomy tube site,  doing ok, diuresing a lot.    Review of Systems   Constitutional: Negative for chills and fever.   HENT: Negative for trouble swallowing.    Respiratory: Negative for cough and shortness of breath.    Cardiovascular: Negative for chest pain and leg swelling.   Gastrointestinal: Negative for abdominal pain, diarrhea and vomiting.   Genitourinary: Negative for dysuria and hematuria.   Musculoskeletal: Positive for back pain.   Skin: Negative for rash.   Neurological: Negative for headaches.   Psychiatric/Behavioral: Negative for confusion.     Objective:     Vital Signs (Most Recent):  Temp: 98.2 °F (36.8 °C) (03/20/19 1239)  Pulse: (!) 57 (03/20/19 1239)  Resp: 18 (03/20/19 1239)  BP: (!) 177/83 (03/20/19 1239)  SpO2: 100 % (03/20/19 1239) Vital Signs (24h Range):  Temp:  [97 °F (36.1 °C)-98.6 °F (37 °C)] 98.2 °F (36.8 °C)  Pulse:  [56-70] 57  Resp:  [18-20] 18  SpO2:  [97 %-100 %] 100 %  BP: (152-177)/(73-83) 177/83     Weight: 70.1 kg (154 lb 8.7 oz)  Body mass index is 25.72 kg/m².    Intake/Output Summary (Last 24 hours) at 3/20/2019 1408  Last data filed at 3/20/2019 0600  Gross per 24 hour   Intake 250 ml   Output 2175 ml   Net -1925 ml      Physical Exam   Constitutional: He is oriented to person, place, and time. No distress.   HENT:   Head: Normocephalic and atraumatic.   Eyes: EOM are normal. Pupils are equal, round, and reactive to light.   Neck: Normal range of motion. Neck supple.   Cardiovascular: Normal rate and regular rhythm.   Pulmonary/Chest: Effort normal and breath sounds normal. No respiratory distress.   Abdominal: Soft. Bowel sounds are normal. He exhibits no distension. There is no tenderness.   Bilateral nephrostomy tubes   Musculoskeletal: Normal range of motion. He exhibits no edema.   Neurological: He is alert and oriented to person, place, and time. No cranial nerve deficit.   Skin: Skin is warm.   Psychiatric: He has a normal  mood and affect.   Vitals reviewed.      Significant Labs:   BMP:   Recent Labs   Lab 03/20/19  0515   *      K 4.2      CO2 23   BUN 26*   CREATININE 2.7*   CALCIUM 9.0   MG 1.9     CBC:   Recent Labs   Lab 03/20/19  0515   WBC 5.60   HGB 8.6*   HCT 25.2*          Significant Imaging: I have reviewed all pertinent imaging results/findings within the past 24 hours.

## 2019-03-20 NOTE — ASSESSMENT & PLAN NOTE
- Worsening hydronephrosis from prior indicating bladder outlet obstruction  - Elevated PSA consistent with prostate cancer.  Will need outpatient prostate biopsy.  - Cystoscopy 3/12 with bilateral obstructions relieved with stenting, but he has had recurrence and persistent hydronephrosis.  - s/p bilateral PCN tube placement by IR with good urine output

## 2019-03-20 NOTE — PT/OT/SLP EVAL
Occupational Therapy   Evaluation and Discharge Note    Name: Elissa Mullins  MRN: [id number]  Admitting Diagnosis:  Bilateral hydronephrosis 1 Day Post-Op    Recommendations:     Discharge Recommendations: home(with family able to assist as needed )  Discharge Equipment Recommendations:  other (see comments)(3-in-1 commode )  Barriers to discharge:  None    Assessment:   Initial OT eval complete.  LB dress task to don/doff socks with supervision with combined downward reaching and cross leg tech while seated EOB.  CGA/SBA for stance from EOB, household ambulation bed<>bathroom without AD, and step transfer to toilet with CGA all for steadying/safety throughout session.  Needs 3-in-1 commode; will defer AD needs to PT.  Recommend participation with nursing staff for ADL tasks.  Will have assist from family as needed.  No acute care or post acute OT needs anticipated.  To d/c OT services.      Elissa Mullins is a 73 y.o. male with a medical diagnosis of Bilateral hydronephrosis. At this time, patient is functioning at their prior level of function and does not require further acute OT services.     Plan:     During this hospitalization, patient does not require further acute OT services.  Please re-consult if situation changes.    · Plan of Care Reviewed with: patient, family    Subjective     Chief Complaint:  No c/o pain.  Patient/Family Comments/goals: Return to granddaughters home and eventually living with daughter in [place].     Occupational Profile:  Living with uncle prior to admit, but will discharge to granddaughters home with eventual d/c to daughters home in [place] in near future.  Granddaughter lives in [address] [place] [address] with 1  Flight of steps to enter with B-handrails.  Has standard toilet and tub/shower combo.  Independent with ADL and ambulating without AD.  Cooks and cleans.  Family members drive to MD appts.  Pt. Walks to the store to purchase food.    Equipment Used at home:  none  Assistance upon Discharge:  Will have assist from family.     Pain/Comfort:  · Pain Rating 1: 0/10  · Pain Rating Post-Intervention 1: 0/10    Patients cultural, spiritual, Confucianist conflicts given the current situation: no    Objective:     Communicated with: Nursing prior to session; nursing staff reporting that Pt. Premedicated for activity.  Patient found supine with HOB elevated and several family members present with peripheral IV, telemetry, nephrostomy(B-nephrostomy with drains) upon OT entry to room.    General Precautions: Standard, fall   Orthopedic Precautions:N/A   Braces: N/A     Occupational Performance:    Bed Mobility:    · Patient completed Supine to Sit with supervision    Functional Mobility/Transfers:  · Patient completed Sit <> Stand Transfer with stand by assistance and contact guard assistance  with  no assistive device   · Patient completed Toilet Transfer Step Transfer technique with stand by assistance and contact guard assistance with  no AD  · Functional Mobility: Ambulated short household distance bed<>bathroom with CGA/SBA for safety.     Activities of Daily Living:  · Lower Body Dressing: supervision to don/doff socks seated EOB via a combo of cross leg tech and downward reaching; no loss of balance noted with dynamic sitting balance task    Cognitive/Visual Perceptual:  Cognitive/Psychosocial Skills:  -       Oriented to: Person, Place, Time and Situation   -       Follows Commands/attention:Follows one-step commands and Follows two-step commands  -       Communication: has stutter, but with increased time communicates well  -       Memory: No Deficits noted  -       Safety awareness/insight to disability: intact   -       Mood/Affect/Coping skills/emotional control: Appropriate to situation and Cooperative  Visual/Perceptual:  -grossly intact    Physical Exam:  Postural examination/scapula alignment: -       Rounded shoulders  -       Forward head  Skin integrity: nephrostomy tubes with drains noted at lower  posterior back  Edema:  None noted  Sensation: -       Intact  light/touch to BUE  Dominant hand: -       Right  Upper Extremity Range of Motion:  -       Right Upper Extremity: WFL  -       Left Upper Extremity: WFL  Upper Extremity Strength: -       Right Upper Extremity: WFL  -       Left Upper Extremity: WFL   Strength: -       Right Upper Extremity: WFL  -       Left Upper Extremity: WFL  Fine Motor Coordination: -       Intact  Left hand thumb/finger opposition skills and Right hand thumb/finger opposition skills    AMPAC 6 Click ADL:  AMPAC Total Score: 23    Treatment & Education:  Educated on role of OT, POC, and DME needs.   Education:    Patient left seated EOB with all lines intact, call button in reach, nursing notified and several family members present    GOALS:   Multidisciplinary Problems     Occupational Therapy Goals     Not on file          Multidisciplinary Problems (Resolved)        Problem: Occupational Therapy Goal    Goal Priority Disciplines Outcome Interventions   Occupational Therapy Goal   (Resolved)     OT, PT/OT Outcome(s) achieved                    History:     Past Medical History:   Diagnosis Date    Flank pain, chronic     Right sided, due to injury in 20's per pet    Hyperlipidemia     Hypertension        Past Surgical History:   Procedure Laterality Date    CREATION, NEPHROSTOMY, PERCUTANEOUS Bilateral 3/18/2019    Performed by George [facility]ckenzie [facility] at [facility] C[facility] LAB    CYSTOURETEROSCOPY, WITH RETROGRADE PYELOGRAM AND URETERAL STENT INSERTION (ADD ON ) Bilateral 3/12/2019    Performed by Faraz Cedeño MD at [facility] OR       Time Tracking:     OT Date of Treatment: 03/19/19  OT Start Time: 1742  OT Stop Time: 1816  OT Total Time (min): 34 min    Billable Minutes:Evaluation 34    Radha Bonilla OT  3/19/2019

## 2019-03-20 NOTE — ASSESSMENT & PLAN NOTE
Metformin held.  Sliding scale insulin.  Sugars were high yesterday but better today  Lab Results   Component Value Date    HGBA1C 6.6 (H) 03/11/2019

## 2019-03-20 NOTE — PT/OT/SLP EVAL
"Physical Therapy Evaluation, Treatment, and Discharge Note    Patient Name:  Elissa Mullins   MRN:  [id number]    Recommendations:     Discharge Recommendations:  home, home health PT(Home with assistance as needed)   Discharge Equipment Recommendations: cane, straight(Pt reports having cane but does not know where it is)   Barriers to discharge: None    Assessment:     Elissa Mullins is a 73 y.o. male admitted with a medical diagnosis of Bilateral hydronephrosis s/p creation bilateral nephrostomy. Pmhx significant for HTN, DM-T2, and prostate cancer. Patient has the following impairments: impaired endurance, gait instability, impaired balance.     Due to the above impairments, the patient is limited in his ability to independently ambulate, transfer, and participate in his chosen activities. After gait training, patient able to ambulate with single point cane with modified independence. Patient ascended/descended 2 stairs with cane and no HR with SBA (unable to do further stair training 2/2 peripheral IV). Recommendation for home with assistance as needed with HH PT and single point cane.     Recent Surgery: Procedure(s) (LRB):  CREATION, NEPHROSTOMY, PERCUTANEOUS (Bilateral) 2 Days Post-Op    Plan:     During this hospitalization, patient does not require further acute PT services.  Please re-consult if situation changes.      Subjective     Chief Complaint: Initially eating lunch and requested PT to wait, second attempt patient with no complaints besides feeling "weaker" after extended hospitalization   Patient/Family Comments/goals: To get a cane to walk with  Pain/Comfort:  · Pain Rating 1: 0/10  · Pain Rating Post-Intervention 1: 0/10    Patients cultural, spiritual, Mandaeism conflicts given the current situation: no    Living Environment:  Patient will be discharging to his granddaughters 2nd floor apartment with 1 flight of stairs to enter with wide BHR.     Prior to admission, patients level of function was " independent with ambulation (walking 2 miles to grocery store), ADLs, and iADLs. Patient is retired from previous employment in construction.   Equipment used at home: none.  DME owned (not currently used): none - patient reports having used a cane after knee replacement several years prior, but does not know where it is.  Upon discharge, patient will have assistance from his granddaughter and other family members.    Objective:     Communicated with LORI Lay prior to session.  Patient found seated at EOB with with peripheral IV, nephrostomy((B) nephrostomy) upon PT entry to room. Patient agreeable to PT evaluation.    General Precautions: Standard, diabetic   Orthopedic Precautions:N/A   Braces: N/A     Patient donned gray socks and gait belt for OOB mobility.    Exams:  · Cognition:   · Patient is A&O x4.  · Pt follows approximately 100% of multistep commands.    · Mood: Pleasant and cooperative.  · Musculoskeletal:  · Posture:    · Forward head  · Slight increased kyphotic thoracic curve  · LE ROM/Strength:   · R ROM: WFL  · L ROM: WFL  · R Strength: Grossly 5/5   · L Strength: Grossly 5/5  · Neuromuscular:  · Sensation: Intact to light touch bilateral LEs.   · Tone/Reflexes: No impairments identified with functional mobility. No formal testing performed.   · Coordination:  · Heel to shin: Intact  · Toe tapping: Intact  · Balance:   · Narrow stance, eyes open: Maintains 30 sec with minimal sway  · Narrow stance, eyes closed: Maintains 30 sec with moderate sway with ankle postural reactions  · Narrow stance, perturbations: Maintains to minimal perturbation, with moderate perturbation LOB posteriorly with pt able to maintain standing balance independently with stepping reponse  · Tandem stance:   · R forward: LOB within 2 sec, patient with stepping response  · L forward: Handheld assistance to attain position, maintains for 5 sec with hip postural reactions prior to lateral LOB with pt maintaining standing indp.  With stepping response  · Visual-vestibular: No impairments identified with functional mobility. No formal testing performed.  · Integument: Visible skin intact and bilateral drains R&L lower back intact with drain collections in pouch  · Cardiopulmonary:  · Edema: None noted to BLE      Functional Mobility:  · Bed Mobility:     · Sit to Supine: stand by assistance  · Transfers:     · Sit to Stand:  modified independence with no AD  · Gait: x300 ft, x75 ft with no AD and SBA-CGA, x225 ft with SPC and Karel.   · Without AD, pt with increased lateral sway, discontinous stepping, and decreased gait speed.   · With AD, patient with more continous stepping and decreased lateral sway.  · Balance: See above for standing balance.  · Stairs:  Pt ascended/descended 2 stair(s) with Straight Cane with no handrails with Stand-by Assistance.   · Patient reports no concerns with ascending/descending stairs with family assistance upon d/c home. Encouraged to have his family member stand below him on the stairs for safety and for him to use the handrails and cane.    AM-PAC 6 CLICK MOBILITY  Total Score:21       Therapeutic Activities and Exercises:   Sit<>stand, gait with training with cane, stairs    PT educated patient re: PT plan of care, role of PT, safety with OOB mobility, use of cane, transfer technique, stairs, discharge disposition, ambulating at least 2x per day with staff assistance.  Pt verbalize understanding.      AM-PAC 6 CLICK MOBILITY  Total Score:21     Patient left supine with all lines intact, call button in reach, RN Rosanne notified and niece present.    GOALS:   Multidisciplinary Problems     Physical Therapy Goals     Not on file          Multidisciplinary Problems (Resolved)        Problem: Physical Therapy Goal    Goal Priority Disciplines Outcome Goal Variances Interventions   Physical Therapy Goal   (Resolved)     PT, PT/OT Outcome(s) achieved                     History:     Past Medical History:    Diagnosis Date    Flank pain, chronic     Right sided, due to injury in 20's per pet    Hyperlipidemia     Hypertension        Past Surgical History:   Procedure Laterality Date    CREATION, NEPHROSTOMY, PERCUTANEOUS Bilateral 3/18/2019    Performed by [facility]enzie [facility] at [facility] CATH LAB    CYSTOURETEROSCOPY, WITH RETROGRADE PYELOGRAM AND URETERAL STENT INSERTION (ADD ON ) Bilateral 3/12/2019    Performed by Faraz Cedeño MD at [facility] OR       Time Tracking:     PT Received On: 03/20/19  PT Start Time: 1115     PT Stop Time: 1138  PT Total Time (min): 23 min     Billable Minutes: Evaluation 15 and Gait Training 8      Fernanda Kaye, PT  03/20/2019

## 2019-03-20 NOTE — PROGRESS NOTES
"[facility]  Urology  Progress Note    Patient Name: Elissa Mullins  MRN: [id number]  Admission Date: 3/11/2019  Hospital Length of Stay: 9 days  Code Status: Full Code   Attending Provider: Bella Grimaldo MD   Primary Care Physician: [facility]    Subjective:     HPI:  Mr. Mullins is a 73 y.o. male who presented to the ED with right flank pain that began this morning. Creatinine on admit was noted to be severely elevated at 4.5 with stable electrolytes. UA with only trace protein and RBCs. CT scan revealed "worsening bilateral hydronephrosis severe on the left and moderate to severe on the right with hydroureter to the level of the bladder suggesting an acute on chronic component.  There is enlargement of the prostate with a nodular contour superiorly which impresses on the bladder base." Upon chart review, it appears the patient presented with similar symptoms in December 2018; however did not follow up as instructed. At the time he had moderate-severe bilateral hydro and a creatinine level of 1.5. Urology was consulted for evaluation.      He reports an aching right flank pain with radiation to the abdomen that began this morning. He also reports the "feeling of incomplete bladder emptying" and urinary frequency that began months ago. Garcia was placed on admit with only 20 mL of urine output. Denies a slow stream, dysuria, hematuria, and fever/chills. + nausea that began last night; however denies vomiting. Denies a history of recurrent UTIs and nephrolithiasis. Denies a personal/family history of prostate cancer. Denies constipation, last BM yesterday.       Interval History: PCN draining well. Pt tolerating well.     Review of Systems   Constitutional: Negative for chills and fever.   HENT: Negative for hearing loss, sore throat and trouble swallowing.    Eyes: Negative.    Respiratory: Negative for cough and shortness of breath.    Cardiovascular: Negative for chest " pain.   Gastrointestinal: Negative for abdominal distention, abdominal pain, constipation, diarrhea, nausea and vomiting.   Genitourinary: Positive for hematuria. Negative for difficulty urinating and frequency.   Musculoskeletal: Negative for arthralgias and neck pain.   Skin: Negative for color change, pallor and rash.   Neurological: Negative for dizziness and seizures.   Hematological: Negative for adenopathy.   Psychiatric/Behavioral: Negative for confusion.   All other systems reviewed and are negative.    Objective:     Temp:  [97 °F (36.1 °C)-98.6 °F (37 °C)] 98 °F (36.7 °C)  Pulse:  [56-70] 63  Resp:  [18-20] 18  SpO2:  [97 %-100 %] 99 %  BP: (152-177)/(73-81) 164/74     Body mass index is 25.72 kg/m².       Bladder Scan Volume (mL): 79 mL (03/18/19 0620)    Drains     Drain                 Nephrostomy 03/18/19 1650 Right 1 day         Nephrostomy 03/18/19 1651 Left 1 day                Physical Exam   Vitals reviewed.  Constitutional: He is oriented to person, place, and time. He appears well-developed and well-nourished. No distress.   HENT:   Head: Normocephalic and atraumatic.   Eyes: Right eye exhibits no discharge. Left eye exhibits no discharge. No scleral icterus.   Neck: Normal range of motion. Neck supple.   Cardiovascular: Normal rate and regular rhythm.    Pulmonary/Chest: Effort normal and breath sounds normal. No respiratory distress.   Abdominal: Soft. Bowel sounds are normal. He exhibits no distension. There is no tenderness. There is no rebound and no guarding.   Genitourinary:   Genitourinary Comments: B/l PCN in place, light pink urine   Musculoskeletal: Normal range of motion.   Neurological: He is alert and oriented to person, place, and time.   Skin: Skin is warm and dry. He is not diaphoretic. No erythema.         Significant Labs:    BMP:  Recent Labs   Lab 03/18/19  0447 03/19/19  0456 03/20/19  0515   * 137 138   K 4.1 4.2 4.2    107 109   CO2 21* 20* 23   BUN 50* 40*  26*   CREATININE 7.1* 4.8* 2.7*   CALCIUM 8.2* 9.2 9.0       CBC:   Recent Labs   Lab 03/14/19  0730 03/15/19  0431 03/20/19  0515   WBC 7.59 6.84 5.60   HGB 9.1* 10.0* 8.6*   HCT 27.2* 29.6* 25.2*    251 271       Blood Culture: No results for input(s): LABBLOO in the last 168 hours.  Urine Culture:   Recent Labs   Lab 03/14/19  1157   LABURIN No growth     Urine Studies:   Recent Labs   Lab 03/15/19  2116   COLORU Yellow   APPEARANCEUA Hazy*   PHUR 7.0   SPECGRAV 1.010   PROTEINUA 1+*   GLUCUA 1+*   KETONESU Negative   BILIRUBINUA Negative   OCCULTUA 3+*   NITRITE Negative   UROBILINOGEN Negative   LEUKOCYTESUR 1+*   RBCUA 80*   WBCUA 3   BACTERIA Occasional   HYALINECASTS 0       Significant Imaging:  All pertinent imaging results/findings from the past 24 hours have been reviewed.                  Assessment/Plan:     * Bilateral hydronephrosis    --Cysto with klaus stent placement on 3/12 with Dr. Cedeño. Appears to be related to advanced prostate cancer.     --Creatinine improved initially with stents, now failed. Now PCN in place  --Now s/p bilateral PCN placement in IR 3/19/19  --Creatinine improving         Prostate cancer    --.2  --Continue Casodex  --Appointment for TRUS/Bx with Dr. Cedeño on 3/25  --Bone scan +mets     ALEIDA (acute kidney injury)    --Failed stents, now with bilateral PCNs  --Creatinine improving, now 2.7             VTE Risk Mitigation (From admission, onward)        Ordered     IP VTE HIGH RISK PATIENT  Once      03/11/19 1343     Place ROSEANNA hose  Until discontinued      03/11/19 1343     Place sequential compression device  Until discontinued      03/11/19 1343          Kailey Martinez MD  Urology  [facility]

## 2019-03-21 VITALS
HEIGHT: 65 IN | DIASTOLIC BLOOD PRESSURE: 68 MMHG | WEIGHT: 154.56 LBS | BODY MASS INDEX: 25.75 KG/M2 | TEMPERATURE: 97 F | OXYGEN SATURATION: 99 % | HEART RATE: 67 BPM | SYSTOLIC BLOOD PRESSURE: 146 MMHG | RESPIRATION RATE: 18 BRPM

## 2019-03-21 LAB
ANION GAP SERPL CALC-SCNC: 9 MMOL/L
BASOPHILS # BLD AUTO: 0.01 K/UL
BASOPHILS NFR BLD: 0.2 %
BUN SERPL-MCNC: 18 MG/DL
CALCIUM SERPL-MCNC: 9.5 MG/DL
CHLORIDE SERPL-SCNC: 105 MMOL/L
CO2 SERPL-SCNC: 24 MMOL/L
CREAT SERPL-MCNC: 2.1 MG/DL
DIFFERENTIAL METHOD: ABNORMAL
EOSINOPHIL # BLD AUTO: 0.1 K/UL
EOSINOPHIL NFR BLD: 1.6 %
ERYTHROCYTE [DISTWIDTH] IN BLOOD BY AUTOMATED COUNT: 13.4 %
EST. GFR  (AFRICAN AMERICAN): 36 ML/MIN/1.73 M^2
EST. GFR  (NON AFRICAN AMERICAN): 31 ML/MIN/1.73 M^2
FOLATE SERPL-MCNC: 8 NG/ML
GLUCOSE SERPL-MCNC: 113 MG/DL
HCT VFR BLD AUTO: 27.6 %
HGB BLD-MCNC: 9.3 G/DL
LYMPHOCYTES # BLD AUTO: 1.6 K/UL
LYMPHOCYTES NFR BLD: 26.2 %
MAGNESIUM SERPL-MCNC: 1.7 MG/DL
MCH RBC QN AUTO: 31 PG
MCHC RBC AUTO-ENTMCNC: 33.7 G/DL
MCV RBC AUTO: 92 FL
MONOCYTES # BLD AUTO: 0.8 K/UL
MONOCYTES NFR BLD: 12.9 %
NEUTROPHILS # BLD AUTO: 3.6 K/UL
NEUTROPHILS NFR BLD: 58.9 %
PHOSPHATE SERPL-MCNC: 2.9 MG/DL
PLATELET # BLD AUTO: 303 K/UL
PMV BLD AUTO: 9.6 FL
POCT GLUCOSE: 108 MG/DL (ref 70–110)
POCT GLUCOSE: 343 MG/DL (ref 70–110)
POTASSIUM SERPL-SCNC: 4.3 MMOL/L
RBC # BLD AUTO: 3 M/UL
SODIUM SERPL-SCNC: 138 MMOL/L
VIT B12 SERPL-MCNC: 581 PG/ML
WBC # BLD AUTO: 6.14 K/UL

## 2019-03-21 PROCEDURE — 83735 ASSAY OF MAGNESIUM: CPT

## 2019-03-21 PROCEDURE — 80048 BASIC METABOLIC PNL TOTAL CA: CPT

## 2019-03-21 PROCEDURE — 25000003 PHARM REV CODE 250: Performed by: NURSE PRACTITIONER

## 2019-03-21 PROCEDURE — 99239 HOSP IP/OBS DSCHRG MGMT >30: CPT | Mod: ,,, | Performed by: INTERNAL MEDICINE

## 2019-03-21 PROCEDURE — 63600175 PHARM REV CODE 636 W HCPCS: Performed by: INTERNAL MEDICINE

## 2019-03-21 PROCEDURE — 99233 PR SUBSEQUENT HOSPITAL CARE,LEVL III: ICD-10-PCS | Mod: ,,, | Performed by: NURSE PRACTITIONER

## 2019-03-21 PROCEDURE — 85025 COMPLETE CBC W/AUTO DIFF WBC: CPT

## 2019-03-21 PROCEDURE — 84100 ASSAY OF PHOSPHORUS: CPT

## 2019-03-21 PROCEDURE — 82746 ASSAY OF FOLIC ACID SERUM: CPT

## 2019-03-21 PROCEDURE — 25000003 PHARM REV CODE 250: Performed by: HOSPITALIST

## 2019-03-21 PROCEDURE — 82607 VITAMIN B-12: CPT

## 2019-03-21 PROCEDURE — 99239 PR HOSPITAL DISCHARGE DAY,>30 MIN: ICD-10-PCS | Mod: ,,, | Performed by: INTERNAL MEDICINE

## 2019-03-21 PROCEDURE — 25000003 PHARM REV CODE 250: Performed by: INTERNAL MEDICINE

## 2019-03-21 PROCEDURE — 94761 N-INVAS EAR/PLS OXIMETRY MLT: CPT

## 2019-03-21 PROCEDURE — 36415 COLL VENOUS BLD VENIPUNCTURE: CPT

## 2019-03-21 PROCEDURE — 99233 SBSQ HOSP IP/OBS HIGH 50: CPT | Mod: ,,, | Performed by: NURSE PRACTITIONER

## 2019-03-21 RX ORDER — HYDROCODONE BITARTRATE AND ACETAMINOPHEN 7.5; 325 MG/1; MG/1
1 TABLET ORAL EVERY 6 HOURS PRN
Qty: 25 TABLET | Refills: 0 | Status: SHIPPED | OUTPATIENT
Start: 2019-03-21 | End: 2019-04-18

## 2019-03-21 RX ORDER — NIFEDIPINE 30 MG/1
60 TABLET, EXTENDED RELEASE ORAL DAILY
Status: DISCONTINUED | OUTPATIENT
Start: 2019-03-21 | End: 2019-03-21 | Stop reason: HOSPADM

## 2019-03-21 RX ORDER — POLYETHYLENE GLYCOL 3350 17 G/17G
17 POWDER, FOR SOLUTION ORAL DAILY
Qty: 30 PACKET | Refills: 1 | Status: SHIPPED | OUTPATIENT
Start: 2019-03-21

## 2019-03-21 RX ORDER — NIFEDIPINE 60 MG/1
60 TABLET, EXTENDED RELEASE ORAL DAILY
Qty: 30 TABLET | Refills: 1 | Status: SHIPPED | OUTPATIENT
Start: 2019-03-22

## 2019-03-21 RX ADMIN — ATORVASTATIN CALCIUM 20 MG: 20 TABLET, FILM COATED ORAL at 09:03

## 2019-03-21 RX ADMIN — HYDROCODONE BITARTRATE AND ACETAMINOPHEN 1 TABLET: 7.5; 325 TABLET ORAL at 09:03

## 2019-03-21 RX ADMIN — AMLODIPINE BESYLATE 10 MG: 5 TABLET ORAL at 09:03

## 2019-03-21 RX ADMIN — BICALUTAMIDE 50 MG: 50 TABLET, FILM COATED ORAL at 09:03

## 2019-03-21 RX ADMIN — HYDROCODONE BITARTRATE AND ACETAMINOPHEN 1 TABLET: 7.5; 325 TABLET ORAL at 01:03

## 2019-03-21 RX ADMIN — NIFEDIPINE 60 MG: 30 TABLET, FILM COATED, EXTENDED RELEASE ORAL at 10:03

## 2019-03-21 RX ADMIN — POLYETHYLENE GLYCOL 3350 17 G: 17 POWDER, FOR SOLUTION ORAL at 05:03

## 2019-03-21 RX ADMIN — STANDARDIZED SENNA CONCENTRATE AND DOCUSATE SODIUM 1 TABLET: 8.6; 5 TABLET, FILM COATED ORAL at 09:03

## 2019-03-21 RX ADMIN — HYDRALAZINE HYDROCHLORIDE 25 MG: 25 TABLET, FILM COATED ORAL at 05:03

## 2019-03-21 RX ADMIN — HEPARIN SODIUM 5000 UNITS: 5000 INJECTION, SOLUTION INTRAVENOUS; SUBCUTANEOUS at 09:03

## 2019-03-21 RX ADMIN — ATENOLOL 50 MG: 25 TABLET ORAL at 09:03

## 2019-03-21 NOTE — PLAN OF CARE
Spoke with pt at bedside.  States he will be going home with granddaughter, winsome [phone]  [address] [place]  [address]  [place] [address].  Contact information updated in Epic.      PT and OT recommended HH, BSC and cane.  Pt states he does not need BSC.  Cane ordered placed, Joana Villatoro from [facility] to pull from closet.  Art, SSC to deliver to pt bedside.  Information added to AVS.    Pt agreeable to HH, as assigned by PHN.  Template shared with MD.  Referral sent to N, [facility] assigned, referral sent in [facility] and information added to AVS.  Awaiting signed MD orders.     CM to continue to follow.

## 2019-03-21 NOTE — PLAN OF CARE
[facility] ORDERS  FACE TO FACE ENCOUNTER    Patient Name: Elissa Mullins  YOB: 1945    PCP: [facility]   PCP Address: [address] / [facility] [address]  PCP Phone Number: [phone]  PCP Fax: [phone]    Encounter Date: 03/21/2019    Admit to Home Health    Diagnoses:  Active Hospital Problems    Diagnosis  POA    *Bilateral hydronephrosis [N13.30]  Yes    Metastasis from malignant neoplasm of prostate [C79.9, C61]  Yes    Postobstructive diuresis [R35.8]  No    Prostate cancer [C61]  Yes    ALEIDA (acute kidney injury) [N17.9]  Yes    Type 2 diabetes mellitus, without long-term current use of insulin [E11.9]  Yes    Essential hypertension [I10]  Yes      Resolved Hospital Problems    Diagnosis Date Resolved POA    Hypomagnesemia [E83.42] 03/15/2019 Yes    Metabolic acidosis [E87.2] 03/13/2019 Yes       Future Appointments   Date Time Provider Department Center   3/25/2019  4:30 PM Faraz Cedeño MD [facility] UROLOGY [facility]     Follow-up Information     Go to [facility].    Why:  PCP-Dr Kaufman  Contact information:  [address]  [facility] [address]  [phone]             Go to Faraz Cedeño MD.    Specialty:  Urology  Contact information:  [address] [facility] [address]  [facility] [address]  [phone]                     I have seen and examined this patient face to face today. My clinical findings that support the need for the home health skilled services and home bound status are the following:  Weakness/numbness causing balance and gait disturbance due to Weakness/Debility and Surgery making it taxing to leave home.  Requiring assistive device to leave home due to unsteady gait caused by  Weakness/Debility and Surgery.    Allergies:Review of patient's allergies indicates:  No Known Allergies    Diet: renal diet    Activities: activity as tolerated    Nursing:   SN to complete  comprehensive assessment including routine vital signs. Instruct on disease process and s/s of complications to report to MD. Review/verify medication list sent home with the patient at time of discharge  and instruct patient/caregiver as needed. Frequency may be adjusted depending on start of care date.    Notify MD if SBP > 160 or < 90; DBP > 90 or < 50; HR > 120 or < 50; Temp > 101;       CONSULTS:    Physical Therapy to evaluate and treat. Evaluate for home safety and equipment needs; Establish/upgrade home exercise program. Perform / instruct on therapeutic exercises, gait training, transfer training, and Range of Motion.  Occupational Therapy to evaluate and treat. Evaluate home environment for safety and equipment needs. Perform/Instruct on transfers, ADL training, ROM, and therapeutic exercises.  Aide to provide assistance with personal care, ADLs, and vital signs.    MISCELLANEOUS CARE:  Diabetic Care:   SN to perform and educate Diabetic management with blood glucose monitoring:    Medications: Review discharge medications with patient and family and provide education.       Elissa Mullins   Home Medication Instructions VARGAS:[id number]    Printed on:03/21/19 7309   Medication Information                      atenolol (TENORMIN) 50 MG tablet  Take 50 mg by mouth once daily.             atorvastatin (LIPITOR) 20 MG tablet  Take 20 mg by mouth once daily.             bicalutamide (CASODEX) 50 MG Tab  Take 1 tablet (50 mg total) by mouth once daily.             HYDROcodone-acetaminophen (NORCO) 7.5-325 mg per tablet  Take 1 tablet by mouth every 6 (six) hours as needed.             NIFEdipine (PROCARDIA-XL) 60 MG (OSM) 24 hr tablet  Take 1 tablet (60 mg total) by mouth once daily.             polyethylene glycol (GLYCOLAX) 17 gram PwPk  Take 17 g by mouth once daily.                   I certify that this patient is confined to his home and needs intermittent skilled nursing care, physical therapy and  occupational therapy.      Bella Grimaldo MD  03/21/2019

## 2019-03-21 NOTE — NURSING
Patient educated on discharge instructions and verbalized understanding.  All questions answered to patient's satisfaction.  IV removed without complication.  Friend at bedside.  Patient to be transported off unit via wheelchair.

## 2019-03-21 NOTE — PROGRESS NOTES
Progress Note  Nephrology    Admit Date: 3/11/2019   LOS: 10 days     SUBJECTIVE:     Follow-up For:  ALEIDA    Interval History:   Continues to improve.  I/O's not accurate which is a chronic issue here.....  Discussed with team.  No CP/SOB.  Pt eager to go home.      OBJECTIVE:     Vital Signs (Most Recent)  Temp: 98.1 °F (36.7 °C) (03/21/19 0741)  Pulse: 79 (03/21/19 0741)  Resp: 16 (03/21/19 0741)  BP: (!) 144/69 (03/21/19 0741)  SpO2: 98 % (03/21/19 0741)    Vital Signs Range (Last 24H):  Temp:  [98 °F (36.7 °C)-98.6 °F (37 °C)]   Pulse:  [57-79]   Resp:  [16-20]   BP: (141-177)/(65-83)   SpO2:  [96 %-100 %]       Intake/Output Summary (Last 24 hours) at 3/21/2019 1039  Last data filed at 3/21/2019 0500  Gross per 24 hour   Intake 575 ml   Output 2975 ml   Net -2400 ml     Review of Systems:   Constitutional: no fever or chills   Respiratory: no cough or shortness of breath   Cardiovascular: no chest pain or palpitations   Gastrointestinal: -nausea   Genitourinary: no hematuria or dysuria   Musculoskeletal: no arthralgias or myalgias   Neurological: no seizures or tremors    Physical Exam:  General appearance: Well developed, well nourished  Eyes:  Conjunctivae/corneas clear. EOMI.  Lungs: Normal respiratory effort,   clear to auscultation bilaterally but few atelectatic sounds.   Heart: Regular rate and rhythm, S1, S2 normal, no murmur, rub or charisse.  Abdomen: Soft; bowel sounds normal; no masses,  no organomegaly  Extremities: No cyanosis or clubbing. No edema.    Skin: Skin color, texture, turgor normal. No rashes or lesions  Neurologic: Normal strength and tone. No focal numbness or weakness   Francisco PCNT's noted with slightly pink urine outpt      Laboratory:  Recent Labs   Lab 03/19/19  0456 03/20/19  0515 03/21/19  0609    138 138   K 4.2 4.2 4.3    109 105   CO2 20* 23 24   BUN 40* 26* 18   CREATININE 4.8* 2.7* 2.1*   CALCIUM 9.2 9.0 9.5   PHOS 4.0 3.6 2.9     Recent Labs   Lab 03/15/19  0430  03/20/19  0515 03/21/19  0609   WBC 6.84 5.60 6.14   HGB 10.0* 8.6* 9.3*   HCT 29.6* 25.2* 27.6*    271 303        Diagnostic Results:  Labs: Reviewed   IR Nephrostomy Tube Placement   Final Result      See IR NEPHROSTOMY TUBE PLACEMENT study report of same date for interpretation.  Accession number [id number]         Electronically signed by: George Mckenzie MD   Date:    03/19/2019   Time:    16:02      NM Bone Scan Whole Body   Final Result      Increased tracer accumulation within the T8 and T10 vertebra, right iliac bone, right superior pubic ramus, within the anterior left 4th rib, and overlying the SI joints bilaterally strongly suggestive of osseous metastatic disease.         Electronically signed by: Quang Sears MD   Date:    03/19/2019   Time:    15:34      IR Nephrostomy Tube Placement   Final Result      Successful bilateral nephrostomy tube placement.      Plan:      Continued care per primary team.      _______________________________________________________________      PROCEDURE SUMMARY      - Image-guided placement of genitourinary catheter(s)      - Additional procedures: None         Electronically signed by: George Mckenzie MD   Date:    03/18/2019   Time:    16:42      US Retroperitoneal Complete (Kidney and   Final Result      Ureteral stents are identified in the bilateral renal pelvises, but there is persistent moderate right and moderate-severe left hydronephrosis.      Elevated resistive indices bilaterally, possibly due to the hydronephrosis.         Electronically signed by: George Mckenzie MD   Date:    03/18/2019   Time:    09:29      SURG FL Surgery Fluoro Usage   Final Result   See OP Notes for results.                   This procedure was auto-finalized by: Virtual Radiologist            CT Renal Stone Study ABD Pelvis WO   Final Result   Abnormal      Worsening bilateral hydronephrosis severe on the left and moderate to severe on the right with hydroureter to the level of  the bladder suggesting an acute on chronic component.  There is enlargement of the prostate with a nodular contour superiorly which impresses on the bladder base.  This is likely relates to the prostate rather than a true bladder mass although follow-up with urology for direct visualization is recommended.      This report was flagged in Epic as abnormal.         Electronically signed by: Tuan Pittman MD   Date:    03/11/2019   Time:    11:15          ASSESSMENT/PLAN:     1. Initially resolved and then worsened ALEIDA on mild CKD III (baseline Creat 1.3) secondary to bilateral hydro/ROBLEDO/retention/Prostate Ca. Now with failed stents.  (N17.9, n18.3, N13.30, C61):  Creat markedly improved following release of hydro/ROBLEDO but then has worsened over weekend when arenas removed.  STAT US done and results noted.  Placement of Bilateral PCNT's noted and creat improving.  Avoid NSAIDS/Bactrim.  IVF's restarted as was having post obstructive diuresis.  Hold IVF's today as he is tolerating orally adequately.  Monitored lytes and must have accurate I/O's .  Renally dose meds, avoid nephrotoxins, and monitor I/O's closely.  2. Hyponatremia secondary to obstruction (E87.1):  resolved after #1.     3. HTN (I12.9):  On BB/CCB. No ACE/ARB at this time.   4. Metastatic Prostate CA (C79.9, C61):  Defer to oncology.        Thank you for allowing me to participate in the care of this patient.    Ok to DC from Renal  Needs BMP by PCP weekly.    Zack Banerjee, BRITNEYP  Nephrology

## 2019-03-21 NOTE — PLAN OF CARE
03/21/19 1416   Discharge Assessment   Assessment Type Final Discharge Note   Transportation Anticipated family or friend will provide   Discharge Plan A Home Health   DME Needed Upon Discharge  cane, straight

## 2019-03-21 NOTE — PLAN OF CARE
Problem: Adult Inpatient Plan of Care  Goal: Plan of Care Review  Outcome: Ongoing (interventions implemented as appropriate)  POC reviewed with patient. Patient free from falls or injury throughout shift. Purposeful rounding completed, no needs at this time. All safety measures in place. Will continue to monitor.

## 2019-03-21 NOTE — PLAN OF CARE
Problem: Adult Inpatient Plan of Care  Goal: Plan of Care Review  Outcome: Ongoing (interventions implemented as appropriate)  No O2 in use, SpO2 99% on room air.

## 2019-03-21 NOTE — PROGRESS NOTES
"[facility]  Urology  Progress Note    Patient Name: Elissa Mullins  MRN: [id number]  Admission Date: 3/11/2019  Hospital Length of Stay: 10 days  Code Status: Full Code   Attending Provider: Bella Grimaldo MD   Primary Care Physician: [facility]    Subjective:     HPI:  Mr. Mullins is a 73 y.o. male who presented to the ED with right flank pain that began this morning. Creatinine on admit was noted to be severely elevated at 4.5 with stable electrolytes. UA with only trace protein and RBCs. CT scan revealed "worsening bilateral hydronephrosis severe on the left and moderate to severe on the right with hydroureter to the level of the bladder suggesting an acute on chronic component.  There is enlargement of the prostate with a nodular contour superiorly which impresses on the bladder base." Upon chart review, it appears the patient presented with similar symptoms in December 2018; however did not follow up as instructed. At the time he had moderate-severe bilateral hydro and a creatinine level of 1.5. Urology was consulted for evaluation.      He reports an aching right flank pain with radiation to the abdomen that began this morning. He also reports the "feeling of incomplete bladder emptying" and urinary frequency that began months ago. Garcia was placed on admit with only 20 mL of urine output. Denies a slow stream, dysuria, hematuria, and fever/chills. + nausea that began last night; however denies vomiting. Denies a history of recurrent UTIs and nephrolithiasis. Denies a personal/family history of prostate cancer. Denies constipation, last BM yesterday.       Interval History:   VSS, afebrile  Eager for d/c home   Tolerating PCNs   Creatinine continues to improve, now 2.1    Review of Systems   Constitutional: Negative for chills and fever.   Respiratory: Negative for chest tightness and shortness of breath.    Cardiovascular: Negative for chest pain and palpitations. "   Gastrointestinal: Negative for abdominal distention, abdominal pain, nausea and vomiting.   Genitourinary: Positive for hematuria. Negative for difficulty urinating, dysuria, flank pain and urgency.     Objective:     Temp:  [97 °F (36.1 °C)-98.6 °F (37 °C)] 97 °F (36.1 °C)  Pulse:  [63-79] 67  Resp:  [16-20] 18  SpO2:  [96 %-99 %] 99 %  BP: (141-166)/(65-77) 146/68     Body mass index is 25.72 kg/m².    Date 03/21/19 0700 - 03/22/19 0659   Shift 4444-8819 9215-7712 3915-2599 24 Hour Total   INTAKE   Shift Total(mL/kg)       OUTPUT   Urine(mL/kg/hr) 800   800   Shift Total(mL/kg) 800(11.4)   800(11.4)   Weight (kg) 70.1 70.1 70.1 70.1     Bladder Scan Volume (mL): 79 mL (03/18/19 0620)    Drains     Drain                 Nephrostomy 03/18/19 1650 Right 2 days         Nephrostomy 03/18/19 1651 Left 2 days                Physical Exam   Constitutional: He is oriented to person, place, and time. He appears well-developed and well-nourished.   Cardiovascular: Normal rate and regular rhythm.    Pulmonary/Chest: Effort normal and breath sounds normal.   Abdominal: Soft. Bowel sounds are normal. He exhibits no distension. There is no tenderness. There is no CVA tenderness.   Bilateral PCNs in place. Sites CDI. Draining clear, pink urine (no clots)    Musculoskeletal: Normal range of motion.   Neurological: He is alert and oriented to person, place, and time.   Skin: Skin is warm and dry.     Psychiatric: He has a normal mood and affect. His behavior is normal.       Significant Labs:    BMP:  Recent Labs   Lab 03/19/19  0456 03/20/19  0515 03/21/19  0609    138 138   K 4.2 4.2 4.3    109 105   CO2 20* 23 24   BUN 40* 26* 18   CREATININE 4.8* 2.7* 2.1*   CALCIUM 9.2 9.0 9.5       CBC:   Recent Labs   Lab 03/15/19  0431 03/20/19  0515 03/21/19  0609   WBC 6.84 5.60 6.14   HGB 10.0* 8.6* 9.3*   HCT 29.6* 25.2* 27.6*    271 303       Urine Culture: No results for input(s): LABURIN in the last 168  hours.  Urine Studies:   Recent Labs   Lab 03/15/19  2116   COLORU Yellow   APPEARANCEUA Hazy*   PHUR 7.0   SPECGRAV 1.010   PROTEINUA 1+*   GLUCUA 1+*   KETONESU Negative   BILIRUBINUA Negative   OCCULTUA 3+*   NITRITE Negative   UROBILINOGEN Negative   LEUKOCYTESUR 1+*   RBCUA 80*   WBCUA 3   BACTERIA Occasional   HYALINECASTS 0       Significant Imaging:  All pertinent imaging results/findings from the past 24 hours have been reviewed.                  Assessment/Plan:     * Bilateral hydronephrosis    --Cysto with klaus stent placement on 3/12 with Dr. Cedeño. Appears to be related to advanced prostate cancer.     --Creatinine improved initially with stents, now failed. Now PCN in place  --Now s/p bilateral PCN placement in IR 3/19/19  --Creatinine improving         Prostate cancer    --.2  --Continue Casodex  --Appointment for TRUS/Bx with Dr. Cedeño on 3/25  --Bone scan +mets     ALEIDA (acute kidney injury)    --Failed stents, now with bilateral PCNs  --Creatinine improving, now 2.1           VTE Risk Mitigation (From admission, onward)        Ordered     heparin (porcine) injection 5,000 Units  Every 12 hours      03/20/19 1407     IP VTE HIGH RISK PATIENT  Once      03/11/19 1343     Place ROSEANNA hose  Until discontinued      03/11/19 1343     Place sequential compression device  Until discontinued      03/11/19 1343        Okay to D/C from  standpoint. Outpatient follow up for TRUS/BX.   I will sign off. Please call with questions or concerns    Kerrie Prater NP  Urology  [facility]

## 2019-03-21 NOTE — PLAN OF CARE
DME - delivered 03/20/2019 Room 303 [id number]  [facility]   Sherri Salinas [facility]  Case Management  [phone]

## 2019-03-21 NOTE — SUBJECTIVE & OBJECTIVE
Interval History:   VSS, afebrile  Eager for d/c home   Tolerating PCNs   Creatinine continues to improve, now 2.1    Review of Systems   Constitutional: Negative for chills and fever.   Respiratory: Negative for chest tightness and shortness of breath.    Cardiovascular: Negative for chest pain and palpitations.   Gastrointestinal: Negative for abdominal distention, abdominal pain, nausea and vomiting.   Genitourinary: Positive for hematuria. Negative for difficulty urinating, dysuria, flank pain and urgency.     Objective:     Temp:  [97 °F (36.1 °C)-98.6 °F (37 °C)] 97 °F (36.1 °C)  Pulse:  [63-79] 67  Resp:  [16-20] 18  SpO2:  [96 %-99 %] 99 %  BP: (141-166)/(65-77) 146/68     Body mass index is 25.72 kg/m².    Date 03/21/19 0700 - 03/22/19 0659   Shift 6903-3638 9774-4651 8125-8082 24 Hour Total   INTAKE   Shift Total(mL/kg)       OUTPUT   Urine(mL/kg/hr) 800   800   Shift Total(mL/kg) 800(11.4)   800(11.4)   Weight (kg) 70.1 70.1 70.1 70.1     Bladder Scan Volume (mL): 79 mL (03/18/19 0620)    Drains     Drain                 Nephrostomy 03/18/19 1650 Right 2 days         Nephrostomy 03/18/19 1651 Left 2 days                Physical Exam   Constitutional: He is oriented to person, place, and time. He appears well-developed and well-nourished.   Cardiovascular: Normal rate and regular rhythm.    Pulmonary/Chest: Effort normal and breath sounds normal.   Abdominal: Soft. Bowel sounds are normal. He exhibits no distension. There is no tenderness. There is no CVA tenderness.   Bilateral PCNs in place. Sites CDI. Draining clear, pink urine (no clots)    Musculoskeletal: Normal range of motion.   Neurological: He is alert and oriented to person, place, and time.   Skin: Skin is warm and dry.     Psychiatric: He has a normal mood and affect. His behavior is normal.       Significant Labs:    BMP:  Recent Labs   Lab 03/19/19  0456 03/20/19  0515 03/21/19  0609    138 138   K 4.2 4.2 4.3    109 105   CO2  20* 23 24   BUN 40* 26* 18   CREATININE 4.8* 2.7* 2.1*   CALCIUM 9.2 9.0 9.5       CBC:   Recent Labs   Lab 03/15/19  0431 03/20/19  0515 03/21/19  0609   WBC 6.84 5.60 6.14   HGB 10.0* 8.6* 9.3*   HCT 29.6* 25.2* 27.6*    271 303       Urine Culture: No results for input(s): LABURIN in the last 168 hours.  Urine Studies:   Recent Labs   Lab 03/15/19  2116   COLORU Yellow   APPEARANCEUA Hazy*   PHUR 7.0   SPECGRAV 1.010   PROTEINUA 1+*   GLUCUA 1+*   KETONESU Negative   BILIRUBINUA Negative   OCCULTUA 3+*   NITRITE Negative   UROBILINOGEN Negative   LEUKOCYTESUR 1+*   RBCUA 80*   WBCUA 3   BACTERIA Occasional   HYALINECASTS 0       Significant Imaging:  All pertinent imaging results/findings from the past 24 hours have been reviewed.

## 2019-03-21 NOTE — DISCHARGE SUMMARY
[facility] Hospital Medicine  Discharge Summary      Patient Name: Elissa Mullins  MRN: [id number]  Admission Date: 3/11/2019  Hospital Length of Stay: 10 days  Discharge Date and Time: 3/21/2019  2:35 PM  Attending Physician: Temi att. providers found   Discharging Provider: Bella Grimaldo MD  Primary Care Provider: [facility]      HPI:   Mr. Mullins is a 74 year old man who presented with pain in the right flank for one day associated with nausea and vomiting.  He has had urinary frequency but no dysuria, hematuria fever or chills.  His urinary stream has been weaker for the last 2 months.  He presented here with left flank pain in December last year, and a CT done at that time showed severe left-sided and moderate right-sided hydronephrosis with and a soft tissue density thought to be the enlarged prostate protruding  into the bladder lumen.  He had a Garcia placed in the ED with improvement in symptoms and was removed prior to discharge.  He was supposed to follow up with urology but did not.  Repeat CT done this presentation showed worsening bilateral hydronephrosis and enlargement of the prostate.  Labs showed ALEIDA with a creatinine of 4.5.  A Garcia was placed with only 20 mL urine produced.  He was admitted with urology consultation.    Procedure(s) (LRB):  CREATION, NEPHROSTOMY, PERCUTANEOUS (Bilateral)      Hospital Course:   Patient was admitted with Garcia in placed for bladder outlet obstruction and urology was consulted.  Noted markedly elevated PSA .2, and other findings consistent with advanced prostate cancer, although he has not had a biopsy yet.  Cystoscopy was planned with retrograde pyelogram and possible stent placement with Dr. Cedeño.  On 3/12 he underwent bilateral ureteroscopy, retrograde pyelogram and bilateral ureteral stent placement.  He was returned to the floor with Garcia in place and after relief of the obstruction had a considerable  post-obstructive diuresis.  He remained on IV fluids while his fluid balance improved.  Voiding trial done 3/14. Garcia was dcd. He continued to diurese until fluid balance was -11 liters.  At that point nephrology was consulted for assistance with fluids.  IV fluids were decreased and the diuresis started to improve, but he became nauseated with vomiting over the weekend 3/16-17, and BUN/creatinine started to increase again.  Bladder scans showed minimal urine in the bladder, and retroperitoneal ultrasound showed persistent moderate right and moderate-severe left hydronephrosis.  IR was consulted to place percutaneous nephrostomy tubes and bilateral tube placement placed on 3/18/19, creatinine started to improve.    Patient started on casodex, bone scan showed Increased tracer accumulation within the T8 and T10 vertebra, right iliac bone, right superior pubic ramus, within the anterior left 4th rib, and overlying the SI joints bilaterally strongly suggestive of osseous metastatic disease.He has an appointment with urology on Monday for prostate biopsy.    His creatinine was improved to 2.1.He still had good bit of output from urostomy tubes and was advised to drink plenty of fluids.Blood pressure was running high and norvasc was changed to procardia and atenolol continued.He was discharged in stable condition with home health.         Consults:   Consults (From admission, onward)        Status Ordering Provider     Inpatient consult to Interventional Radiology  Once     Provider:  (Not yet assigned)    Completed LISA BRANNON     Inpatient consult to Nephrology-[facility]  Once     Provider:  Zack Banerjee NP    Completed CHARANJIT ONTIVEROS     Inpatient consult to Urology  Once     Provider:  Faraz Cedeño MD    Completed SHARIFA FAIRCHILD          No new Assessment & Plan notes have been filed under this hospital service since the last note was generated.  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 [C79.9, C61] 03/20/2019 Yes    Postobstructive diuresis [R35.8] 03/13/2019 No    Prostate cancer [C61]  Yes    ALEIDA (acute kidney injury) [N17.9] 03/11/2019 Yes    Type 2 diabetes mellitus, without long-term current use of insulin [E11.9] 03/11/2019 Yes    Essential hypertension [I10] 03/11/2019 Yes      Problems Resolved During this Admission:    Diagnosis Date Noted Date Resolved POA    Hypomagnesemia [E83.42] 03/14/2019 03/15/2019 Yes    Metabolic acidosis [E87.2] 03/11/2019 03/13/2019 Yes       Discharged Condition: stable    Disposition: [facility]Health Care [facility] [place]    Follow Up:  Follow-up Information     Go to [facility].    Why:  PCP-Dr Kaufman  Contact information:  [address] [facility] [id number] [phone]             Go to Faraz Cedeño MD.    Specialty:  Urology  Contact information:  [address]  [facility] [id number] [phone]             [facility]    Specialty:  DME Provider  Why:  With questions regarding medical equipment - cane  Contact information:  [address]  [facility] [address]  [phone]             [facility].    Why:  they will call you to schedule first appointment for home health  Contact information:  [address]  [facility] [id number] [phone]             Faraz Cedeño MD.    Specialty:  Urology  Why:  on 3/25/2019  Contact information:  [address]  [facility] [id number] [phone]             [facility] In 1 week.    Contact information:  [address] [facility] [address] [phone]                 Patient Instructions:      CANE FOR HOME USE     Order Specific Question Answer Comments   Type of Cane: Straight    Height: 5' 5" (1.651 m)    Weight: 70.1 kg (154 lb 8.7 oz)    Does " patient have medical equipment at home? none    Length of need (1-99 months): 99    Please check all that apply: Patient's condition impairs ambulation.    Please check all that apply: Patient is unable to safely ambulate without equipment.    Please check all that apply: Cane will be used for gait training.      Diet renal     Activity as tolerated       Significant Diagnostic Studies:  Lab Results   Component Value Date    WBC 6.14 03/21/2019    HGB 9.3 (L) 03/21/2019    HCT 27.6 (L) 03/21/2019    MCV 92 03/21/2019     03/21/2019     BMP  Lab Results   Component Value Date     03/21/2019    K 4.3 03/21/2019     03/21/2019    CO2 24 03/21/2019    BUN 18 03/21/2019    CREATININE 2.1 (H) 03/21/2019    CALCIUM 9.5 03/21/2019    ANIONGAP 9 03/21/2019    ESTGFRAFRICA 36 (A) 03/21/2019    EGFRNONAA 31 (A) 03/21/2019       IR Nephrostomy Tube Placement  See IR NEPHROSTOMY TUBE PLACEMENT study report of same date for interpretation.  Accession number [id number]    Electronically signed by: George Mckenzie MD  Date:    03/19/2019  Time:    16:02  NM Bone Scan Whole Body  Narrative: EXAMINATION:  NM BONE SCAN WHOLE BODY    CLINICAL HISTORY:  suspected advanced prostate cancer;    TECHNIQUE:  Following the IV administration of 24.8 mCi of Tc-99m-MDP, anterior and posterior delayed whole body images were acquired with additional lateral views of the thorax and lumbar spine.    COMPARISON:  None.    FINDINGS:  There is focal increased tracer accumulation within the T8 and T10 vertebra, right iliac bone, overlying the right and left SI joints, the right superior pubic ramus, and the anterior aspect of the left 4th rib.  These findings are strongly suggestive of osseous metastatic disease.  There is also increased tracer accumulation within the lower cervical spine and lower lumbar spine which may reflect degenerative changes.  Increased tracer accumulation is noted within both shoulders also likely representing  degenerative changes.  Normal renal excretion of activity is noted.  Impression: Increased tracer accumulation within the T8 and T10 vertebra, right iliac bone, right superior pubic ramus, within the anterior left 4th rib, and overlying the SI joints bilaterally strongly suggestive of osseous metastatic disease.    Electronically signed by: Quang Sears MD  Date:    03/19/2019  Time:    15:34    Renal us 3/18/2019  TECHNIQUE:  Ultrasound of the kidneys and urinary bladder was performed including color flow and Doppler evaluation of the kidneys.    COMPARISON:  None.    FINDINGS:  Right kidney: The right kidney measures 10.7 cm. No cortical thinning. No loss of corticomedullary distinction. Resistive index measures 0.86.  No mass. No renal stone. Moderate hydronephrosis.  Partial visualization of a ureteral stent in the right renal pelvis.    Left kidney: The left kidney measures 10.7 cm. No cortical thinning. No loss of corticomedullary distinction. Resistive index measures 0.86.  No mass. No renal stone. Moderate-severe hydronephrosis.  Partial visualization of a ureteral stent in the left renal pelvis.    The bladder is partially distended at the time of scanning and has an unremarkable appearance.      Impression       Ureteral stents are identified in the bilateral renal pelvises, but there is persistent moderate right and moderate-severe left hydronephrosis.    Elevated resistive indices bilaterally, possibly due to the hydronephrosis.       Pending Diagnostic Studies:     None         Medications:  Reconciled Home Medications:      Medication List      START taking these medications    bicalutamide 50 MG Tab  Commonly known as:  CASODEX  Take 1 tablet (50 mg total) by mouth once daily.     HYDROcodone-acetaminophen 7.5-325 mg per tablet  Commonly known as:  NORCO  Take 1 tablet by mouth every 6 (six) hours as needed.     NIFEdipine 60 MG (OSM) 24 hr tablet  Commonly known as:  PROCARDIA-XL  Take 1 tablet (60  mg total) by mouth once daily.  Start taking on:  3/22/2019     polyethylene glycol 17 gram Pwpk  Commonly known as:  GLYCOLAX  Take 17 g by mouth once daily.        CONTINUE taking these medications    atenolol 50 MG tablet  Commonly known as:  TENORMIN  Take 50 mg by mouth once daily.     atorvastatin 20 MG tablet  Commonly known as:  LIPITOR  Take 20 mg by mouth once daily.        STOP taking these medications    amLODIPine 5 MG tablet  Commonly known as:  NORVASC     aspirin 81 MG EC tablet  Commonly known as:  ECOTRIN     ibuprofen 400 MG tablet  Commonly known as:  ADVIL,MOTRIN     losartan-hydrochlorothiazide 100-12.5 mg 100-12.5 mg Tab  Commonly known as:  HYZAAR     metFORMIN 500 MG tablet  Commonly known as:  GLUCOPHAGE            Indwelling Lines/Drains at time of discharge:   Lines/Drains/Airways     Drain                 Nephrostomy 03/18/19 1650 Right 2 days         Nephrostomy 03/18/19 1651 Left 2 days                Time spent on the discharge of patient: 40  minutes  Patient was seen and examined on the date of discharge and determined to be suitable for discharge.         Bella Grimaldo MD  Department of Hospital Medicine  [facility]

## 2019-03-22 ENCOUNTER — PATIENT OUTREACH (OUTPATIENT)
Dept: ADMINISTRATIVE | Facility: CLINIC | Age: 74
End: 2019-03-22

## 2019-03-22 NOTE — PATIENT INSTRUCTIONS
Discharge Instructions for Percutaneous Nephrostomy  You had a procedure called percutaneous nephrostomy. This means that urine was drained from your kidney to prevent pain, infection, and kidney damage. You had the procedure because your kidney or the tube leading from the kidney to the bladder (ureter) was blocked by a kidney stone or tumor, or perhaps due to another problem. The blockage caused a backup of urine in your kidney.  A thin, flexible tube called a catheter will stay in place until the problem that caused the buildup of urine has been treated. This may be as soon as a day or as long as weeks to months. The catheter bag is taped to your leg so that you can walk around.  Activity  · Dont lift anything heavier than 10 pounds until your healthcare provider says its OK.  · Avoid strenuous activities, such as mowing the lawn, vacuuming, playing sports, or engaging in anything that will cause your tubing to be pulled or moved.  · Slowly increase your activity level with short, frequent walks 3 to 4 times a day.  · Dont drive while you are still taking pain medicine. Wait until your healthcare provider says its OK to drive.  Home care  · Eat your normal diet.  · Drink 6 to 8 glasses of water a day, unless directed otherwise.  · Wear loose, comfortable clothes that wont pull or kink the catheter tube.  · Check your dressing often to make sure the tubing is secure.  · Dont let the drainage bag hang freely, or it will pull on the catheter. Keep it taped to your leg or hold it temporarily.  · Empty the drainage bag often to keep the weight of the bag from pulling on the catheter.  ¨ Empty the bag when it is one-half to two-thirds full.  ¨ Always empty the bag before you go to bed.  ¨ Wash your hands before and after emptying the bag.  · Measure and record the amount and color of the urine in the bag.  · Gently clean the skin around the catheter with mild soap and warm water. Pat dry with a clean  towel.  · Change your dressing if it becomes loose or dirty.  · Throw away the dressing in a plastic bag.  · If you were asked to stop any medicines before the surgery, be sure to ask the healthcare provider when you may restart taking them. This is especially important in the case of blood thinners (anticoagulants or antiplatelet medicines).  Follow-up care  Make a follow-up appointment as directed by our staff.     When to call your healthcare provider  Call your healthcare provider right away if you have any of these:  · A catheter that is not draining  · The catheter comes out. Do not try to put it back in.  · Pain, redness, or discharge around catheter  · Fever of 100.4°F (38°C) or higher, or as directed by your healthcare provider  · A noticeable increase or decrease in the amount of urine that drains  · Cloudy or smelly urine  · Urine that changes to a pink or red color  · Increased pain  · Severe pain in your side  · Nausea and vomiting   Date Last Reviewed: 2/1/2017  © 0745-0189 [employer]. [address] [place] [address], [place] [address]. All rights reserved. This information is not intended as a substitute for professional medical care. Always follow your healthcare professional's instructions.

## 2019-03-25 ENCOUNTER — TELEPHONE (OUTPATIENT)
Dept: UROLOGY | Facility: CLINIC | Age: 74
End: 2019-03-25

## 2019-03-25 NOTE — TELEPHONE ENCOUNTER
I spoke to Alysha and [employer].  His rx is at [facility] but they have no insurance info on file.  We will ask the pt to bring insurance info to [facility] today after his biopsy.

## 2019-03-25 NOTE — TELEPHONE ENCOUNTER
----- Message from [facility] sent at 3/25/2019 12:10 PM CDT -----  Contact: Alysha [facility] [phone]  Name of Who is Calling:Alysha        What is the request in detail: Alysha is requesting a call back from clinical staff in regards to the pt medication casodex. Alysha stated that pt was discharge on it but no script. Alysha stated she just wanted to be advised by clinical staff to see if this was ok. Please contact to further discuss and advise.        Can the clinic reply by [facility]:       What [facility] to Call Back [facility] not [facility]: [phone]

## 2019-03-27 ENCOUNTER — TELEPHONE (OUTPATIENT)
Dept: UROLOGY | Facility: CLINIC | Age: 74
End: 2019-03-27

## 2019-03-27 NOTE — TELEPHONE ENCOUNTER
----- Message from Mercedes Carreno sent at 3/27/2019  3:57 PM CDT -----  Contact: Pt   Name of Who is Calling: SHIV GILMORE [[id number]]      What is the request in detail: Patient is requesting a call from staff to see if he should still take his medications before his biopsy. Please contact to further discuss and advise    Can the clinic reply by [facility]: No       What [facility]: [phone]

## 2019-04-01 ENCOUNTER — TELEPHONE (OUTPATIENT)
Dept: UROLOGY | Facility: CLINIC | Age: 74
End: 2019-04-01

## 2019-04-01 NOTE — TELEPHONE ENCOUNTER
----- Message from Mercedes Carreno sent at 4/1/2019  2:03 PM CDT -----  Name of Who is Calling: SHIV GILMORE [[id number]]      What is the request in detail: Patient was told he can't have his scheduled procedure on tomorrow due to his irregular heartbeat. Please contact to further discuss and advise      Can the clinic reply by [facility]: No       What Number to Call Back if not in [facility]: [phone]

## 2019-04-01 NOTE — TELEPHONE ENCOUNTER
Spoke with patient.  He states his PCP told him that because of his irregular heart beat he cannot be put to sleep for any procedures.  Informed patient that his procedure for tomorrow does not require him to be put to sleep and it is done in the office .  patient states he will keep appt

## 2019-04-02 ENCOUNTER — PROCEDURE VISIT (OUTPATIENT)
Dept: UROLOGY | Facility: CLINIC | Age: 74
End: 2019-04-02
Attending: UROLOGY
Payer: MEDICARE

## 2019-04-02 VITALS
HEIGHT: 65 IN | WEIGHT: 154 LBS | HEART RATE: 74 BPM | SYSTOLIC BLOOD PRESSURE: 140 MMHG | DIASTOLIC BLOOD PRESSURE: 81 MMHG | BODY MASS INDEX: 25.66 KG/M2

## 2019-04-02 DIAGNOSIS — C61 MALIGNANT NEOPLASM OF PROSTATE: ICD-10-CM

## 2019-04-02 DIAGNOSIS — R97.20 ELEVATED PSA: ICD-10-CM

## 2019-04-02 PROCEDURE — 96372 THER/PROPH/DIAG INJ SC/IM: CPT | Mod: 59,S$GLB,, | Performed by: UROLOGY

## 2019-04-02 PROCEDURE — 76872 TRANSRECTAL ULTRASOUND W/ BIOPSY: ICD-10-PCS | Mod: S$GLB,,, | Performed by: UROLOGY

## 2019-04-02 PROCEDURE — 76942 TRANSRECTAL ULTRASOUND W/ BIOPSY: ICD-10-PCS | Mod: 59,S$GLB,, | Performed by: UROLOGY

## 2019-04-02 PROCEDURE — 88305 TISSUE EXAM BY PATHOLOGIST: CPT | Mod: 26,,, | Performed by: PATHOLOGY

## 2019-04-02 PROCEDURE — 88305 TISSUE EXAM BY PATHOLOGIST: CPT | Performed by: PATHOLOGY

## 2019-04-02 PROCEDURE — 88305 TISSUE SPECIMEN TO PATHOLOGY, UROLOGY: ICD-10-PCS | Mod: 26,,, | Performed by: PATHOLOGY

## 2019-04-02 PROCEDURE — 55700 TRANSRECTAL ULTRASOUND W/ BIOPSY: ICD-10-PCS | Mod: S$GLB,,, | Performed by: UROLOGY

## 2019-04-02 PROCEDURE — 76942 ECHO GUIDE FOR BIOPSY: CPT | Mod: 59,S$GLB,, | Performed by: UROLOGY

## 2019-04-02 PROCEDURE — 76872 US TRANSRECTAL: CPT | Mod: S$GLB,,, | Performed by: UROLOGY

## 2019-04-02 PROCEDURE — 96372 PR INJECTION,THERAP/PROPH/DIAG2ST, IM OR SUBCUT: ICD-10-PCS | Mod: 59,S$GLB,, | Performed by: UROLOGY

## 2019-04-02 PROCEDURE — 55700 TRANSRECTAL ULTRASOUND W/ BIOPSY: CPT | Mod: S$GLB,,, | Performed by: UROLOGY

## 2019-04-02 RX ORDER — ASPIRIN 81 MG/1
TABLET ORAL
Refills: 3 | COMMUNITY
Start: 2019-03-25

## 2019-04-02 RX ORDER — LIDOCAINE HYDROCHLORIDE 10 MG/ML
10 INJECTION INFILTRATION; PERINEURAL
Status: COMPLETED | OUTPATIENT
Start: 2019-04-02 | End: 2019-04-02

## 2019-04-02 RX ORDER — CIPROFLOXACIN 500 MG/1
TABLET ORAL
Refills: 0 | COMMUNITY
Start: 2019-03-25 | End: 2019-04-18

## 2019-04-02 RX ORDER — METFORMIN HYDROCHLORIDE 500 MG/1
TABLET ORAL
Refills: 3 | COMMUNITY
Start: 2019-03-25

## 2019-04-02 RX ORDER — LIDOCAINE HYDROCHLORIDE 20 MG/ML
JELLY TOPICAL
Status: COMPLETED | OUTPATIENT
Start: 2019-04-02 | End: 2019-04-02

## 2019-04-02 RX ORDER — GENTAMICIN SULFATE 40 MG/ML
80 INJECTION, SOLUTION INTRAMUSCULAR; INTRAVENOUS
Status: COMPLETED | OUTPATIENT
Start: 2019-04-02 | End: 2019-04-02

## 2019-04-02 RX ORDER — AMLODIPINE BESYLATE 5 MG/1
TABLET ORAL
Refills: 3 | COMMUNITY
Start: 2019-03-25

## 2019-04-02 RX ADMIN — LIDOCAINE HYDROCHLORIDE 5 ML: 20 JELLY TOPICAL at 01:04

## 2019-04-02 RX ADMIN — LIDOCAINE HYDROCHLORIDE 10 ML: 10 INJECTION INFILTRATION; PERINEURAL at 01:04

## 2019-04-02 RX ADMIN — GENTAMICIN SULFATE 80 MG: 40 INJECTION, SOLUTION INTRAMUSCULAR; INTRAVENOUS at 01:04

## 2019-04-02 NOTE — PROCEDURES
Transrectal Ultrasound w/ Biopsy  Date/Time: 4/2/2019 11:05 AM  Performed by: Faraz Cedeño MD  Authorized by: Kerrie Prater NP     Consent Done?:  Yes (Written)  Indications: Elevated PSA    Indications comment:  369  Preparation: Patient was prepped and draped in usual sterile fashion    Position:  Left lateral  Anesthesia:  10cc's 1% Lidocaine  Patient sedated: No    Prostate Size:  28.7cm^3  Left [place] Biopsies: 2  Left Mid Biopsies: 2  Left [place] Biopsies: 2  Right Base Biopsies: 2  Right Mid Biopsies: 2  Right [place] Biopsies: 2  Transitional zone: No    Total Biopsies:  12    Patient tolerance:  Patient tolerated the procedure well with no immediate complications    Plan  Continue Casodex  Follow-up 2 weeks to review results and will plan for Eligard at that time

## 2019-04-12 ENCOUNTER — TELEPHONE (OUTPATIENT)
Dept: UROLOGY | Facility: CLINIC | Age: 74
End: 2019-04-12

## 2019-04-15 ENCOUNTER — TELEPHONE (OUTPATIENT)
Dept: UROLOGY | Facility: CLINIC | Age: 74
End: 2019-04-15

## 2019-04-15 ENCOUNTER — TELEPHONE (OUTPATIENT)
Dept: ADMINISTRATIVE | Facility: CLINIC | Age: 74
End: 2019-04-15

## 2019-04-15 NOTE — TELEPHONE ENCOUNTER
Home Health SOC 03/25/2019 - 05/23/2019 with [facility] - Dr. Bella Grimaldo. Patient received SN, PT, OT and HHA services.

## 2019-04-15 NOTE — TELEPHONE ENCOUNTER
----- Message from Aleksey Persaud sent at 4/15/2019  3:14 PM CDT -----  Contact: SHIV GILMORE [[id number]]  Name of Who is Calling: SHIV GILMORE [[id number]]      What is the request in detail: Patient would like to speak with staff in regards to knowing if he is approved for treatment tomorrow. Please advise      Can the clinic reply by [facility]: no    What Number to Call Back [facility] not [facility]: [phone]

## 2019-04-18 ENCOUNTER — OFFICE VISIT (OUTPATIENT)
Dept: UROLOGY | Facility: CLINIC | Age: 74
End: 2019-04-18
Attending: UROLOGY
Payer: MEDICARE

## 2019-04-18 VITALS
SYSTOLIC BLOOD PRESSURE: 116 MMHG | WEIGHT: 154 LBS | HEIGHT: 65 IN | BODY MASS INDEX: 25.66 KG/M2 | DIASTOLIC BLOOD PRESSURE: 67 MMHG | HEART RATE: 95 BPM

## 2019-04-18 DIAGNOSIS — C61 MALIGNANT NEOPLASM OF PROSTATE: Primary | ICD-10-CM

## 2019-04-18 PROCEDURE — 96402 CHEMO HORMON ANTINEOPL SQ/IM: CPT | Mod: S$GLB,,, | Performed by: UROLOGY

## 2019-04-18 PROCEDURE — 3078F PR MOST RECENT DIASTOLIC BLOOD PRESSURE < 80 MM HG: ICD-10-PCS | Mod: CPTII,S$GLB,, | Performed by: UROLOGY

## 2019-04-18 PROCEDURE — 96402 PR CHEMOTHER HORMON ANTINEOPL SUB-Q/IM: ICD-10-PCS | Mod: S$GLB,,, | Performed by: UROLOGY

## 2019-04-18 PROCEDURE — 99214 OFFICE O/P EST MOD 30 MIN: CPT | Mod: 25,S$GLB,, | Performed by: UROLOGY

## 2019-04-18 PROCEDURE — 1101F PR PT FALLS ASSESS DOC 0-1 FALLS W/OUT INJ PAST YR: ICD-10-PCS | Mod: CPTII,S$GLB,, | Performed by: UROLOGY

## 2019-04-18 PROCEDURE — 3074F PR MOST RECENT SYSTOLIC BLOOD PRESSURE < 130 MM HG: ICD-10-PCS | Mod: CPTII,S$GLB,, | Performed by: UROLOGY

## 2019-04-18 PROCEDURE — 3074F SYST BP LT 130 MM HG: CPT | Mod: CPTII,S$GLB,, | Performed by: UROLOGY

## 2019-04-18 PROCEDURE — 1101F PT FALLS ASSESS-DOCD LE1/YR: CPT | Mod: CPTII,S$GLB,, | Performed by: UROLOGY

## 2019-04-18 PROCEDURE — 3078F DIAST BP <80 MM HG: CPT | Mod: CPTII,S$GLB,, | Performed by: UROLOGY

## 2019-04-18 PROCEDURE — 99214 PR OFFICE/OUTPT VISIT, EST, LEVL IV, 30-39 MIN: ICD-10-PCS | Mod: 25,S$GLB,, | Performed by: UROLOGY

## 2019-04-18 RX ORDER — LOSARTAN POTASSIUM AND HYDROCHLOROTHIAZIDE 12.5; 1 MG/1; MG/1
1 TABLET ORAL DAILY
COMMUNITY

## 2019-04-18 NOTE — PROGRESS NOTES
"Subjective:      Elissa Mullins is a 73 y.o. y.o. male who returns today regarding his prostate cancer.     He initially presented as inpatient in early March with ALEIDA and bilateral hydronephrosis with PSA of 369. Bilateral stents were placed with an initial improvement in renal function, however stents subsequently failed and bilateral PCNs were placed. Bone scan demonstrated multiple areas of likely metastasis.     He is s/p TRUS/biopsy on 4/2/2019 and is here to review results.      He has been on casodex for 1 month. PCNs remain in place with good function.    The following portions of the patient's history were reviewed and updated as appropriate: allergies, current medications, past family history, past medical history, past social history, past surgical history and problem list.       Objective:   Vitals: /67 (BP Location: Right arm, Patient Position: Sitting, BP Method: Large (Automatic))   Pulse 95   Ht 5' 5" (1.651 m)   Wt 69.9 kg (154 lb)   BMI 25.63 kg/m²      Physical Exam   General: alert and oriented, no acute distress  Respiratory: Symmetric expansion, non-labored breathing  Neuro: no gross deficits  Psych: normal judgment and insight, normal mood/affect and non-anxious    Lab Review   Component      Latest Ref Rng & Units 3/11/2019   PSA DIAGNOSTIC      0.00 - 4.00 ng/mL 369.2 (H)       Pathology  Prostate Biopsy: [place] 4+5=9 in all 12 cores    Assessment and Plan:   Prostate Cancer with bone metastasis  -- Eligard today as planned  -- Refer to Oncology for possible chemo  -- FU 1 mos w/ NP to schedule PCN change/removal  "

## 2019-04-24 ENCOUNTER — TELEPHONE (OUTPATIENT)
Dept: HEMATOLOGY/ONCOLOGY | Facility: CLINIC | Age: 74
End: 2019-04-24

## 2019-05-08 ENCOUNTER — TELEPHONE (OUTPATIENT)
Dept: UROLOGY | Facility: CLINIC | Age: 74
End: 2019-05-08

## 2019-05-08 NOTE — TELEPHONE ENCOUNTER
----- Message from Yazmin Deras sent at 5/8/2019  4:51 PM CDT -----  Contact: Dr. Donohue  Name of Who is Calling: Dr. Donohue    What is the request in detail:Dr. Donohue is requesting a call from Dr. Cedeño in regards to patient transferring care to [place]  Please contact to further discuss and advise      Can the clinic reply by [facility]:     What Number to Call Back [facility] not [facility]: [phone]

## 2019-05-08 NOTE — TELEPHONE ENCOUNTER
Spoke with . She is asking for you to contact her directly in regarding pt . 's direct number is [phone], she alsostated that if she does not answer she will call you right back.

## 2019-05-16 ENCOUNTER — TELEPHONE (OUTPATIENT)
Dept: UROLOGY | Facility: CLINIC | Age: 74
End: 2019-05-16

## 2019-05-16 NOTE — TELEPHONE ENCOUNTER
LMOR that we need fax number for his records to be faxed.  Also LM that his appt for tomorrow is rescheduled.

## 2019-05-16 NOTE — TELEPHONE ENCOUNTER
----- Message from Varsha Santana sent at 5/16/2019 11:28 AM CDT -----  Contact: Pt    Name of Who is Calling:SHIV GILMORE [[id number]]    What is the request in detail: Patient would like a call back regarding  medical records to be sent to [place] so that he can be seen there  Please contact to further discuss and advise    Can the clinic reply by [facility]: No    What Number to Call Back if not in [facility]: [phone]

## 2019-05-17 ENCOUNTER — TELEPHONE (OUTPATIENT)
Dept: HEMATOLOGY/ONCOLOGY | Facility: CLINIC | Age: 74
End: 2019-05-17

## 2019-06-28 NOTE — TELEPHONE ENCOUNTER
Mr Mullins scheduled today with Dr Prather for a new patient consultation visit.  Harpreet N/S his appointment. Called  Harpreet to discuss rescheduling his appointment, N/A message left on voice mail.  
Yes

## 2019-12-11 NOTE — TELEPHONE ENCOUNTER
Spoke with the pt eligard injection not approved, rescheduled appointment for 4/18-10:15am.          Thanks Jennifer   -Holding anti-HTN medications in the setting of low blood pressure with infection.  - readd as tolerated - Holding anti-HTN medications in the setting of low blood pressure with infection.  - can restart home anti-HTN meds now

## 2021-02-02 NOTE — PLAN OF CARE
Problem: Adult Inpatient Plan of Care  Goal: Plan of Care Review  Outcome: Ongoing (interventions implemented as appropriate)  Pt in no distress on room air, will continue to monitor.       Ambulatory

## 2023-01-08 NOTE — SUBJECTIVE & OBJECTIVE
Interval History:   Doing well  Creatinine continues to improve, now 2.5  Garcia removed this am, has yet to void  Casodex started yesterday     Review of Systems   Constitutional: Negative for chills and fever.   Respiratory: Negative for chest tightness and shortness of breath.    Cardiovascular: Negative for chest pain and palpitations.   Gastrointestinal: Negative for abdominal distention, abdominal pain, nausea and vomiting.   Genitourinary: Positive for hematuria. Negative for difficulty urinating, dysuria, flank pain, frequency and urgency.     Objective:     Temp:  [96.3 °F (35.7 °C)-98.9 °F (37.2 °C)] 96.3 °F (35.7 °C)  Pulse:  [54-66] 60  Resp:  [17-20] 20  SpO2:  [94 %-98 %] 98 %  BP: (159-184)/(71-78) 173/78     Body mass index is 25.72 kg/m².       Bladder Scan Volume (mL): 20 mL (03/11/19 1141)    Drains          None          Physical Exam   Constitutional: He is oriented to person, place, and time. He appears well-developed and well-nourished.   HENT:   Head: Normocephalic and atraumatic.   Cardiovascular: Normal rate, regular rhythm and normal heart sounds.    Pulmonary/Chest: Effort normal and breath sounds normal.   Abdominal: Soft. Bowel sounds are normal. He exhibits no distension. There is no tenderness. There is no CVA tenderness.   Musculoskeletal: Normal range of motion.   Neurological: He is alert and oriented to person, place, and time.   Skin: Skin is warm and dry.     Psychiatric: He has a normal mood and affect. His behavior is normal.       Significant Labs:    BMP:  Recent Labs   Lab 03/13/19  0753 03/14/19  0422 03/15/19  0431    139 141   K 3.9 3.9 4.5    103 103   CO2 29 29 28   BUN 43* 36* 31*   CREATININE 4.4* 3.1* 2.5*   CALCIUM 8.6* 8.5* 9.6       CBC:   Recent Labs   Lab 03/13/19  0753 03/14/19  0730 03/15/19  0431   WBC 7.95 7.59 6.84   HGB 9.3* 9.1* 10.0*   HCT 27.3* 27.2* 29.6*    221 251       Urine Culture: No results for input(s): LABURIN in the last  168 hours.  Urine Studies:   Recent Labs   Lab 03/11/19  0906   COLORU Yellow  Yellow   APPEARANCEUA Clear  Clear   PHUR 6.0  6.0   SPECGRAV 1.020  1.020   PROTEINUA Trace*  Trace*   GLUCUA Negative  Negative   KETONESU Negative  Negative   BILIRUBINUA Negative  Negative   OCCULTUA Trace*  Trace*   NITRITE Negative  Negative   UROBILINOGEN Negative  Negative   LEUKOCYTESUR Negative  Negative       Significant Imaging:  All pertinent imaging results/findings from the past 24 hours have been reviewed.               ED

## 2024-03-03 NOTE — ASSESSMENT & PLAN NOTE
Continue norvasc, atenolol  Hold hctz, losartan with anthony     Patient/Caregiver provided printed discharge information.

## 2024-09-27 NOTE — ASSESSMENT & PLAN NOTE
Metformin held.  Sliding scale insulin.     Seen with neuro resident.  This is a 74 yo M, domiciled alone, PMH reported bipolar depression on Lexapro, Dementia thought to be Alzheimer's, reported baseline of A&Ox to self only, presents due to worsening dementia and functional decline. Psychiatry consulted for agitation recommendations, as pt required haldol and zyprexa given as PRNs overnight. On evaluation this morning, pt appears very drowsy and does not interact during interview. Per collateral from son, pt appears to have declined rapidly in the past several months.    IMPRESSION: Major Neurocognitive disorder, unclear if due to Alzheimer's or other dementia type.    RECOMMENDATIONS:  - Start Rexulti (brexpiprazole) 0.5mg daily, to treat agitation/neurobehavioral disturbance in setting of dementia  - For agitation not responsive to verbal redirection and presenting an acute danger to self or others: can utilize haldol 2mg q6h PRN. Check Qtc daily and avoid antipsychotics if Qtc >500ms.  - Recommend palliative care consult, given reported recent rapid functional decline in setting of dementia  - Correction of any underlying medical derangements that could lead to delirium and worsening of pt's mental status - would also recommend drawing TSH, B12, folate, HIV, and syphilis.  - Continued collateral from son or pt's home health aides.  - Psychiatry will continue to follow.

## (undated) DEVICE — BAG DRAINAGE DISP LF 600ML

## (undated) DEVICE — CATH URTRL OPEN END STR TIP 5F

## (undated) DEVICE — GLOVE BIOGEL SKINSENSE PI 7.5

## (undated) DEVICE — FLOWSWITCH HP 1-W W/O LL

## (undated) DEVICE — TUBING CONTRAST INJCTN F-M 10

## (undated) DEVICE — GLOVE PROTEXIS PI CLASSIC 7.5

## (undated) DEVICE — DILATOR VESSEL 8FR

## (undated) DEVICE — DILATOR VESSEL 6FR

## (undated) DEVICE — GUIDE WIRE MOTION .035 X 150CM

## (undated) DEVICE — KIT PROBE COVER WITH GEL

## (undated) DEVICE — BRUSH SCRUB SURGICALW/BETADINE

## (undated) DEVICE — CATH NEPHROSTOMY DRN 8F

## (undated) DEVICE — GLOVE PROTEXIS PI CLASSIC 9.0

## (undated) DEVICE — Device

## (undated) DEVICE — SUT SILK 2.0 BLK 18

## (undated) DEVICE — SET CYSTO IRRIGATION UNIV SPIK

## (undated) DEVICE — GUIDEWIRE EMERALD 150CM PTFE

## (undated) DEVICE — GLOVE PROTEXIS PI CLASSIC 8.5

## (undated) DEVICE — GUIDE WIRE .018X175CM

## (undated) DEVICE — SOL IRR NACL .9% 3000ML

## (undated) DEVICE — SOL 9P NACL IRR PIC IL

## (undated) DEVICE — ELECTRODE REM PLYHSV RETURN 9

## (undated) DEVICE — APPLICATOR CHLORAPREP CLR 10.5

## (undated) DEVICE — GUIDEWIRE NITINOL HYBRID 150CM

## (undated) DEVICE — WIRE GUIDE 0.038OLD

## (undated) DEVICE — GOWN SMART IMP BREATHABLE XXLG

## (undated) DEVICE — FASTENER CATH 12-22FR SM/LRG

## (undated) DEVICE — INTRODUCER ACCUSTICK RO MARKER